# Patient Record
Sex: FEMALE | Race: BLACK OR AFRICAN AMERICAN | NOT HISPANIC OR LATINO | URBAN - METROPOLITAN AREA
[De-identification: names, ages, dates, MRNs, and addresses within clinical notes are randomized per-mention and may not be internally consistent; named-entity substitution may affect disease eponyms.]

---

## 2021-03-12 ENCOUNTER — EMERGENCY (EMERGENCY)
Facility: HOSPITAL | Age: 51
LOS: 1 days | Discharge: HOME | End: 2021-03-12
Attending: HOSPITALIST | Admitting: HOSPITALIST
Payer: COMMERCIAL

## 2021-03-12 VITALS
OXYGEN SATURATION: 100 % | RESPIRATION RATE: 16 BRPM | HEART RATE: 68 BPM | SYSTOLIC BLOOD PRESSURE: 144 MMHG | TEMPERATURE: 98 F | DIASTOLIC BLOOD PRESSURE: 88 MMHG

## 2021-03-12 VITALS
DIASTOLIC BLOOD PRESSURE: 82 MMHG | OXYGEN SATURATION: 100 % | TEMPERATURE: 98 F | SYSTOLIC BLOOD PRESSURE: 151 MMHG | RESPIRATION RATE: 15 BRPM | HEART RATE: 63 BPM

## 2021-03-12 PROCEDURE — 99284 EMERGENCY DEPT VISIT MOD MDM: CPT

## 2021-03-12 NOTE — ED PROVIDER NOTE - EYES
Normal rate, regular rhythm.  Heart sounds S1, S2.  No murmurs, rubs or gallops. Right Abnormal/Left Normal

## 2021-03-12 NOTE — ED PROVIDER NOTE - OBJECTIVE STATEMENT
51 Y/O F PMH HTN, Anxiety, Depression PSH  X 2 C/O R eye pain for the past 2 days. Pt states the pain is 7/10 with associated blurry vision in the R eye. Pt states she wears glasses but states the vision is normal when corrected. Pt states she has not seen an eye doctor recently. Pt denies any other sx or acute complaints.

## 2021-03-12 NOTE — ED ADULT TRIAGE NOTE - CHIEF COMPLAINT QUOTE
Pt with co redness to right eye with burning and itchiness since  yesterday. pt also co blurry vision to right eye . Pt wears glasses.  Pt also  co  headache to forehead  since last night.

## 2021-03-12 NOTE — ED PROVIDER NOTE - PROGRESS NOTE DETAILS
AARON Quiroga: Unable to measure pressure in spite of trying X2, consulted Optho resident who will eval. AARON Quiroga: Optho gave D/C recommendations, will D/C.

## 2021-03-12 NOTE — ED PROVIDER NOTE - ATTENDING CONTRIBUTION TO CARE
50F with hx of htn, anxiety p/w redness to her right eye for the past 2 days. + pain and blurry vision despite using her glasses. no similar sx in the past, trauma to the eye, fevers.    ***GEN - NAD; well appearing; A+O x3 ***HEAD - NC/AT ***EYES/NOSE - PERRL, + right eyue scleral injection EOMI, mucous membranes moist, no discharge ***THROAT: Oral cavity and pharynx normal. No inflammation, swelling, exudate, or lesions.  ***NECK: Neck supple, non-tender without lymphadenopathy, no masses, no thyromegaly.   ***PULMONARY - CTA b/l, symmetric breath sounds. ***CARDIAC -s1s2, RRR, no M,G,R  ***ABDOMEN - +BS, ND, NT, soft, no guarding, no rebound, no masses   ***BACK - no CVA tenderness, Normal  spine ***EXTREMITIES - symmetric pulses, 2+ dp, capillary refill < 2 seconds, no clubbing, no cyanosis, no edema ***SKIN - no rash or bruising   ***NEUROLOGIC - alert, CN 2-12 intact    MDM: 50F with right eye redness, pain and blurry vision. measure pressures, visual acuity, ophthal eval.

## 2021-03-12 NOTE — ED PROVIDER NOTE - NSFOLLOWUPINSTRUCTIONS_ED_ALL_ED_FT
Use the artifical tears eye drops every hour while awake. Use over the counter Systane Nightime Lubricant ointment before bedtime. Report to the Eye clinic on Monday for follow up.  Advance activity as tolerated.  Continue all previously prescribed medications as directed.  Follow up with your primary care physician in 48-72 hours- bring copies of your results.  Return to the ER for worsening or persistent symptoms, and/or ANY NEW OR CONCERNING SYMPTOMS. THIS INCLUDES BUT IS NOT LIMITED TO LOSS OR CHANGE OF VISION OR FOR ANY OTHER SYMPTOMS THAT CONCERN YOU. If you have issues obtaining follow up, please call: [phone] to obtain a doctor or specialist who takes your insurance in your area.  You may call [phone] to make an appointment with the internal medicine clinic. Go to the [facility] eye clinic on Monday. The address is [address] [place] [address] and the phone is . Use the artifical tears eye drops every hour while awake. Use over the counter Systane Nightime Lubricant ointment before bedtime. Report to the Eye clinic on Monday for follow up.  Advance activity as tolerated.  Continue all previously prescribed medications as directed.  Follow up with your primary care physician in 48-72 hours- bring copies of your results.  Return to the ER for worsening or persistent symptoms, and/or ANY NEW OR CONCERNING SYMPTOMS. THIS INCLUDES BUT IS NOT LIMITED TO LOSS OR CHANGE OF VISION OR FOR ANY OTHER SYMPTOMS THAT CONCERN YOU. If you have issues obtaining follow up, please call: [phone] to obtain a doctor or specialist who takes your insurance in your area.  You may call [phone] to make an appointment with the internal medicine clinic.

## 2021-03-12 NOTE — ED PROVIDER NOTE - PATIENT PORTAL LINK FT
You can access the FollowMyHealth Patient Portal offered by [facility] by registering at the following website: [email or web address]. By joining [employer]’s FollowMyHealth portal, you will also be able to view your health information using other applications (apps) compatible with our system.

## 2021-03-12 NOTE — ED PROVIDER NOTE - CLINICAL SUMMARY MEDICAL DECISION MAKING FREE TEXT BOX
49 Y/O F PMH HTN, Anxiety, Depression PSH  X 2 C/O R eye pain for the past 2 days. Pt states the pain is 7/10 with associated blurry vision in the R eye. Pt states she wears glasses but states the vision is normal when corrected. Unable to obtain pressures via tonopen, consulted Optho who will eval.

## 2021-03-12 NOTE — CONSULT NOTE ADULT - SUBJECTIVE AND OBJECTIVE BOX
[facility] DEPARTMENT OF OPHTHALMOLOGY - INITIAL ADULT CONSULT  -----------------------------------------------------------------------------  Catrachita Lovelace MD, MPH, PGY-2  Pager: [phone]  -----------------------------------------------------------------------------    HPI: 49 yo female hx HTN, anxiety, depression presents to [facility] ED for a history of red eye OD x1 day. Pt works as a  and while she was sitting at her desk at work yesterday, the pain started. Patient endorses blurry vision, + monocular vertical diplopia OD with retro-orbital eye pain and pain with EOM on adduction and infraduction. Denies flashes/floaters. + photosensitivity. Denies pulsatile tinnitus. Headache 5/10 around right frontal area that does not radiate. Pt has had migraines in the past but this does not feel like a migraine headache. Denies GCA symptoms (jaw pain, unexplained weight loss, fevers, night sweats, new joint pain) but does endorse scalp tenderness right>left.     Pt denies history of autoimmune disease. Denies new rashes,  symptoms. Pt also denies trauma to the eye or FB to the eye. Tried saline eye drops without relief.     PMH: per HPI   POcHx: denies surg/laser  FH: denies glc/amd  Social History: denies etoh/tobacco  Ophthalmic Medications: none  Allergies: NKDA    Review of Systems: per HPI     VITALS: T(C): --  T(F): --  HR: --  BP: --  RR: --  SpO2: --  Wt(kg): --  General: AAO x 3, appropriate mood and affect    Ophthalmology Exam:  Visual acuity (cc): 20/25-2 OD, 20/30-2 OS   Pupils: PERRL OU, no APD (right pupil sluggishly reactive, left pupil brisk)  Ttono: 16/11  Extraocular movements (EOMs): Full OU, pain on adduction and infraduction OD. + monocular vertical diplopia OD.   Confrontational Visual Field (CVF): Full OU  Color Plates: 12/12 OU    Pen Light Exam (PLE)  External: Flat OU  Lids/Lashes/Lacrimal Ducts: Flat OU    Sclera/Conjunctiva: W+Q OU  Cornea: Cl OU  Anterior Chamber: D+F OU    Iris: Flat OU  Lens: Cl OU    Fundus Exam: dilated with 1% tropicamide and 2.5% phenylephrine  Approval obtained from primary team for dilation  Patient aware that pupils can remained dilated for at least 4-6 hours  Exam performed with 20D lens    Vitreous: wnl OU  Disc, cup/disc: sharp and pink, 0.4 OU  Macula: wnl OU  Vessels: wnl OU  Periphery: wnl OU    Labs/Imaging:     A/P: 49 yo female hx HTN, anxiety, depression presents to [facility] ED for a history of red eye OD x1 day.    [facility] DEPARTMENT OF OPHTHALMOLOGY - INITIAL ADULT CONSULT  -----------------------------------------------------------------------------  Catrachita Lovelace MD, MPH, PGY-2  Pager: [phone]  -----------------------------------------------------------------------------    HPI: 49 yo female hx HTN, anxiety, depression presents to [facility] ED for a history of red eye OD x1 day. Pt works as a  and while she was sitting at her desk at work yesterday, the pain started. Patient endorses blurry vision, + monocular vertical diplopia OD with retro-orbital eye pain and pain with EOM on adduction and infraduction. Denies flashes/floaters. + photosensitivity. Denies pulsatile tinnitus. Headache 5/10 around right frontal area that does not radiate. Pt has had migraines in the past but this does not feel like a migraine headache. Denies GCA symptoms (jaw pain, unexplained weight loss, fevers, night sweats, new joint pain) but does endorse scalp tenderness right>left.     Pt denies history of autoimmune disease. Denies new rashes,  symptoms. Pt also denies trauma to the eye or FB to the eye. Tried saline eye drops without relief.     PMH: per HPI   POcHx: denies surg/laser  FH: denies glc/amd  Social History: denies etoh/tobacco  Ophthalmic Medications: none  Allergies: NKDA    Review of Systems: per HPI     VITALS: T(C): --  T(F): --  HR: --  BP: --  RR: --  SpO2: --  Wt(kg): --  General: AAO x 3, appropriate mood and affect    Ophthalmology Exam:  Visual acuity (cc): 20/25-2 OD, 20/30-2 OS   Pupils: PERRL OU, no APD (right pupil sluggishly reactive, left pupil brisk)  Ttono: 16/11  Extraocular movements (EOMs): Full OU, pain on adduction and infraduction OD. + monocular vertical diplopia OD.   Confrontational Visual Field (CVF): Full OU  Color Plates: 12/12 OU    Pen Light Exam (PLE)  External: Flat OU  Lids/Lashes/Lacrimal Ducts: Flat OU    Sclera/Conjunctiva: 1+ conj injection OD, white and quiet OS   Cornea: 2-3+ SPK with mucoid stranding OD (no infiltrate or epi defect), 1+ SPK OS   Anterior Chamber: 1-2 cell with possible flare OD (difficult exam due to severity of surface disease), dark and quiet OS   Iris: Flat OU (no synechiae OD)  Lens: NS and CC OU     Fundus Exam: dilated with 1% tropicamide and 2.5% phenylephrine  Approval obtained from primary team for dilation  Patient aware that pupils can remained dilated for at least 4-6 hours  Exam performed with 20D lens    Vitreous: wnl OU  Disc, cup/disc: sharp and pink, 0.3 OU  Macula: wnl OU  Vessels: wnl OU  Periphery: wnl OU    No RTR, no proptosis   +Scalp tenderness bilateral, temporal artery pulses palpable bilaterally     Labs/Imaging:     A/P: 49 yo female hx HTN, anxiety, depression presents to [facility] ED for a history of red eye OD x1 day. Exam findings c/w severe dry eye OU. 1-2 cell on AC exam OD not impressive for uveitis, difficult to discern flare based on severity of surface disease. Pain with EOM likely in s/o surface inflammation. No suspicion for GCA at this time given patient age, scalp tenderness likely unrelated and no other reported symptoms. Sluggish pupil reactivity OD could be from underlying sphincter issue. No APD appreciated on exam. Please see recommendations below:     #Severe dry eye OD>OS   - VA excellent   - IOP wnl   - Artificial tears preservative free q1hr OU while awake   - Systane ointment nightly OU   - Will call patient over the weekend for symptom check, if not improving/worsening, will see over the weekend as emergent in-office visit, otherwise will see her in clinic on Monday (Address below)     [address] [place] [address]   [phone]     Case d/w Dr. Prince, Chief Resident.        [facility] DEPARTMENT OF OPHTHALMOLOGY - INITIAL ADULT CONSULT  -----------------------------------------------------------------------------  Catrachita Lovelace MD, MPH, PGY-2  Pager: [phone]  -----------------------------------------------------------------------------    HPI: 49 yo female hx HTN, anxiety, depression presents to [facility] ED for a history of red eye OD x1 day. Pt works as a  and while she was sitting at her desk at work yesterday, the pain started. Patient endorses blurry vision, + monocular vertical diplopia OD with retro-orbital eye pain and pain with EOM on adduction and infraduction. Denies flashes/floaters. + photosensitivity. Denies pulsatile tinnitus. Headache 5/10 around right frontal area that does not radiate. Pt has had migraines in the past but this does not feel like a migraine headache. Denies GCA symptoms (jaw pain, unexplained weight loss, fevers, night sweats, new joint pain) but does endorse scalp tenderness right>left.     Pt denies history of autoimmune disease. Denies new rashes,  symptoms. Pt also denies trauma to the eye or FB to the eye. Tried saline eye drops without relief.     PMH: per HPI   POcHx: denies surg/laser  FH: denies glc/amd  Social History: denies etoh/tobacco  Ophthalmic Medications: none  Allergies: penicillin and morphine (?, pt unsure)    Review of Systems: per HPI     VITALS: T(C): --  T(F): --  HR: --  BP: --  RR: --  SpO2: --  Wt(kg): --  General: AAO x 3, appropriate mood and affect    Ophthalmology Exam:  Visual acuity (cc): 20/25-2 OD, 20/30-2 OS   Pupils: PERRL OU, no APD (right pupil sluggishly reactive, left pupil brisk)  Ttono: 16/11  Extraocular movements (EOMs): Full OU, pain on adduction and infraduction OD. + monocular vertical diplopia OD.   Confrontational Visual Field (CVF): Full OU  Color Plates: 12/12 OU    Pen Light Exam (PLE)  External: Flat OU  Lids/Lashes/Lacrimal Ducts: Flat OU    Sclera/Conjunctiva: 1+ conj injection OD, white and quiet OS   Cornea: 2-3+ SPK with mucoid stranding OD (no infiltrate or epi defect), 1+ SPK OS   Anterior Chamber: 1-2 cell with possible flare OD (difficult exam due to severity of surface disease), dark and quiet OS   Iris: Flat OU (no synechiae OD)  Lens: NS and CC OU     Fundus Exam: dilated with 1% tropicamide and 2.5% phenylephrine  Approval obtained from primary team for dilation  Patient aware that pupils can remained dilated for at least 4-6 hours  Exam performed with 20D lens    Vitreous: wnl OU  Disc, cup/disc: sharp and pink, 0.3 OU  Macula: wnl OU  Vessels: wnl OU  Periphery: wnl OU    No RTR, no proptosis   +Scalp tenderness bilateral, temporal artery pulses palpable bilaterally     Labs/Imaging:     A/P: 49 yo female hx HTN, anxiety, depression presents to [facility] ED for a history of red eye OD x1 day. Exam findings c/w severe dry eye OU. 1-2 cell on AC exam OD not impressive for uveitis, difficult to discern flare based on severity of surface disease. Pain with EOM likely in s/o surface inflammation. No suspicion for GCA at this time given patient age, scalp tenderness likely unrelated and no other reported symptoms. Sluggish pupil reactivity OD could be from underlying sphincter issue. No APD appreciated on exam. Please see recommendations below:     #Severe dry eye OD>OS   - VA excellent   - IOP wnl   - Artificial tears preservative free q1hr OU while awake   - Systane ointment nightly OU   - Will call patient over the weekend for symptom check, if not improving/worsening, will see over the weekend as emergent in-office visit, otherwise will see her in clinic on Monday (Address below)     [address] [place] [id number]   [phone]     Case d/w Dr. Prince, Chief Resident.

## 2021-10-02 ENCOUNTER — EMERGENCY (EMERGENCY)
Facility: HOSPITAL | Age: 51
LOS: 1 days | Discharge: HOME | End: 2021-10-02
Attending: EMERGENCY MEDICINE | Admitting: EMERGENCY MEDICINE
Payer: COMMERCIAL

## 2021-10-02 VITALS
HEART RATE: 64 BPM | OXYGEN SATURATION: 100 % | RESPIRATION RATE: 15 BRPM | TEMPERATURE: 98 F | DIASTOLIC BLOOD PRESSURE: 91 MMHG | SYSTOLIC BLOOD PRESSURE: 144 MMHG

## 2021-10-02 VITALS
OXYGEN SATURATION: 100 % | RESPIRATION RATE: 12 BRPM | TEMPERATURE: 98 F | DIASTOLIC BLOOD PRESSURE: 115 MMHG | HEART RATE: 68 BPM | SYSTOLIC BLOOD PRESSURE: 223 MMHG

## 2021-10-02 PROBLEM — I10 ESSENTIAL (PRIMARY) HYPERTENSION: Chronic | Status: ACTIVE | Noted: 2021-03-12

## 2021-10-02 PROBLEM — F41.9 ANXIETY DISORDER, UNSPECIFIED: Chronic | Status: ACTIVE | Noted: 2021-03-12

## 2021-10-02 PROBLEM — F32.9 MAJOR DEPRESSIVE DISORDER, SINGLE EPISODE, UNSPECIFIED: Chronic | Status: ACTIVE | Noted: 2021-03-12

## 2021-10-02 LAB
ALBUMIN SERPL ELPH-MCNC: 4.1 G/DL — SIGNIFICANT CHANGE UP (ref 3.3–5)
ALP SERPL-CCNC: 77 U/L — SIGNIFICANT CHANGE UP (ref 40–120)
ALT FLD-CCNC: 14 U/L — SIGNIFICANT CHANGE UP (ref 4–33)
ANION GAP SERPL CALC-SCNC: 11 MMOL/L — SIGNIFICANT CHANGE UP (ref 7–14)
AST SERPL-CCNC: 17 U/L — SIGNIFICANT CHANGE UP (ref 4–32)
BASOPHILS # BLD AUTO: 0.02 K/UL — SIGNIFICANT CHANGE UP (ref 0–0.2)
BASOPHILS NFR BLD AUTO: 0.4 % — SIGNIFICANT CHANGE UP (ref 0–2)
BILIRUB SERPL-MCNC: 0.3 MG/DL — SIGNIFICANT CHANGE UP (ref 0.2–1.2)
BUN SERPL-MCNC: 11 MG/DL — SIGNIFICANT CHANGE UP (ref 7–23)
CALCIUM SERPL-MCNC: 9.6 MG/DL — SIGNIFICANT CHANGE UP (ref 8.4–10.5)
CHLORIDE SERPL-SCNC: 103 MMOL/L — SIGNIFICANT CHANGE UP (ref 98–107)
CO2 SERPL-SCNC: 24 MMOL/L — SIGNIFICANT CHANGE UP (ref 22–31)
CREAT SERPL-MCNC: 0.78 MG/DL — SIGNIFICANT CHANGE UP (ref 0.5–1.3)
EOSINOPHIL # BLD AUTO: 0.03 K/UL — SIGNIFICANT CHANGE UP (ref 0–0.5)
EOSINOPHIL NFR BLD AUTO: 0.5 % — SIGNIFICANT CHANGE UP (ref 0–6)
GLUCOSE SERPL-MCNC: 84 MG/DL — SIGNIFICANT CHANGE UP (ref 70–99)
HCT VFR BLD CALC: 43.6 % — SIGNIFICANT CHANGE UP (ref 34.5–45)
HGB BLD-MCNC: 14.4 G/DL — SIGNIFICANT CHANGE UP (ref 11.5–15.5)
IANC: 2.63 K/UL — SIGNIFICANT CHANGE UP (ref 1.5–8.5)
IMM GRANULOCYTES NFR BLD AUTO: 0.2 % — SIGNIFICANT CHANGE UP (ref 0–1.5)
LYMPHOCYTES # BLD AUTO: 2.41 K/UL — SIGNIFICANT CHANGE UP (ref 1–3.3)
LYMPHOCYTES # BLD AUTO: 43 % — SIGNIFICANT CHANGE UP (ref 13–44)
MCHC RBC-ENTMCNC: 28.4 PG — SIGNIFICANT CHANGE UP (ref 27–34)
MCHC RBC-ENTMCNC: 33 GM/DL — SIGNIFICANT CHANGE UP (ref 32–36)
MCV RBC AUTO: 86 FL — SIGNIFICANT CHANGE UP (ref 80–100)
MONOCYTES # BLD AUTO: 0.51 K/UL — SIGNIFICANT CHANGE UP (ref 0–0.9)
MONOCYTES NFR BLD AUTO: 9.1 % — SIGNIFICANT CHANGE UP (ref 2–14)
NEUTROPHILS # BLD AUTO: 2.63 K/UL — SIGNIFICANT CHANGE UP (ref 1.8–7.4)
NEUTROPHILS NFR BLD AUTO: 46.8 % — SIGNIFICANT CHANGE UP (ref 43–77)
NRBC # BLD: 0 /100 WBCS — SIGNIFICANT CHANGE UP
NRBC # FLD: 0 K/UL — SIGNIFICANT CHANGE UP
PLATELET # BLD AUTO: 285 K/UL — SIGNIFICANT CHANGE UP (ref 150–400)
POTASSIUM SERPL-MCNC: 4.3 MMOL/L — SIGNIFICANT CHANGE UP (ref 3.5–5.3)
POTASSIUM SERPL-SCNC: 4.3 MMOL/L — SIGNIFICANT CHANGE UP (ref 3.5–5.3)
PROT SERPL-MCNC: 7.5 G/DL — SIGNIFICANT CHANGE UP (ref 6–8.3)
RBC # BLD: 5.07 M/UL — SIGNIFICANT CHANGE UP (ref 3.8–5.2)
RBC # FLD: 13.1 % — SIGNIFICANT CHANGE UP (ref 10.3–14.5)
SODIUM SERPL-SCNC: 138 MMOL/L — SIGNIFICANT CHANGE UP (ref 135–145)
TROPONIN T, HIGH SENSITIVITY RESULT: <6 NG/L — SIGNIFICANT CHANGE UP
WBC # BLD: 5.61 K/UL — SIGNIFICANT CHANGE UP (ref 3.8–10.5)
WBC # FLD AUTO: 5.61 K/UL — SIGNIFICANT CHANGE UP (ref 3.8–10.5)

## 2021-10-02 PROCEDURE — 99285 EMERGENCY DEPT VISIT HI MDM: CPT

## 2021-10-02 PROCEDURE — 71046 X-RAY EXAM CHEST 2 VIEWS: CPT | Mod: 26

## 2021-10-02 RX ORDER — METOCLOPRAMIDE HCL 10 MG
10 TABLET ORAL ONCE
Refills: 0 | Status: COMPLETED | OUTPATIENT
Start: 2021-10-02 | End: 2021-10-02

## 2021-10-02 RX ORDER — AMLODIPINE BESYLATE 2.5 MG/1
1 TABLET ORAL
Qty: 30 | Refills: 0
Start: 2021-10-02 | End: 2021-10-31

## 2021-10-02 RX ORDER — ACETAMINOPHEN 500 MG
975 TABLET ORAL ONCE
Refills: 0 | Status: COMPLETED | OUTPATIENT
Start: 2021-10-02 | End: 2021-10-02

## 2021-10-02 RX ORDER — AMLODIPINE BESYLATE 2.5 MG/1
10 TABLET ORAL ONCE
Refills: 0 | Status: COMPLETED | OUTPATIENT
Start: 2021-10-02 | End: 2021-10-02

## 2021-10-02 RX ORDER — LOSARTAN/HYDROCHLOROTHIAZIDE 100MG-25MG
1 TABLET ORAL
Qty: 30 | Refills: 0
Start: 2021-10-02 | End: 2021-10-31

## 2021-10-02 RX ORDER — LOSARTAN POTASSIUM 100 MG/1
100 TABLET, FILM COATED ORAL ONCE
Refills: 0 | Status: COMPLETED | OUTPATIENT
Start: 2021-10-02 | End: 2021-10-02

## 2021-10-02 RX ORDER — SODIUM CHLORIDE 9 MG/ML
1000 INJECTION INTRAMUSCULAR; INTRAVENOUS; SUBCUTANEOUS ONCE
Refills: 0 | Status: COMPLETED | OUTPATIENT
Start: 2021-10-02 | End: 2021-10-02

## 2021-10-02 RX ORDER — HYDROCHLOROTHIAZIDE 25 MG
25 TABLET ORAL ONCE
Refills: 0 | Status: COMPLETED | OUTPATIENT
Start: 2021-10-02 | End: 2021-10-02

## 2021-10-02 RX ADMIN — Medication 975 MILLIGRAM(S): at 14:46

## 2021-10-02 RX ADMIN — Medication 10 MILLIGRAM(S): at 14:52

## 2021-10-02 RX ADMIN — AMLODIPINE BESYLATE 10 MILLIGRAM(S): 2.5 TABLET ORAL at 15:31

## 2021-10-02 RX ADMIN — SODIUM CHLORIDE 2000 MILLILITER(S): 9 INJECTION INTRAMUSCULAR; INTRAVENOUS; SUBCUTANEOUS at 14:47

## 2021-10-02 RX ADMIN — LOSARTAN POTASSIUM 100 MILLIGRAM(S): 100 TABLET, FILM COATED ORAL at 15:31

## 2021-10-02 RX ADMIN — Medication 25 MILLIGRAM(S): at 15:30

## 2021-10-02 NOTE — ED ADULT NURSE REASSESSMENT NOTE - NS ED NURSE REASSESS COMMENT FT1
Pt resting comfortably in bed, states headache is tolerable at this time, BP continues to be high meds given as ordered, will reassess.

## 2021-10-02 NOTE — ED PROVIDER NOTE - OBJECTIVE STATEMENT
Attendinyo female presents with elevated blood pressure for several weeks.  pt moved here about 1 year ago from [place] and has not established medical care yet.  Pt has been off her BP meds for about 1 year now.  has been having gradual onset headaches for the past several weeks.  no fever or chills.  no nausea or vomiting.  had some mild chest pressure at rest that is non exertional earlier today.  no shortness of breath.  no diaphoresis, no nausea or vomiting.  pt went to urgent care today and was sent here after being given aspirin.

## 2021-10-02 NOTE — ED PROVIDER NOTE - CLINICAL SUMMARY MEDICAL DECISION MAKING FREE TEXT BOX
Hypertension, likely due to medication noncompliance.  headache sounds benign based on history and physical.  will treat symptoms.   Chest pain:  will get EKG, troponin, and reassess.

## 2021-10-02 NOTE — ED PROVIDER NOTE - PROGRESS NOTE DETAILS
Acerra:  pt feels much better.  headache resolved.  no nausea or vomiting.  will discharge with outpatient PCP followup.

## 2021-10-02 NOTE — ED PROVIDER NOTE - NSFOLLOWUPINSTRUCTIONS_ED_ALL_ED_FT
Follow up with your Doctor in 2-3 days.    Take your blood pressure medication as directed.    Keep a blood pressure diary and share this with your doctor.  Return to the ED for worse headache, vomiting, nausea, chest pain or other emergent concerns.

## 2021-10-02 NOTE — ED PROVIDER NOTE - PATIENT PORTAL LINK FT
You can access the FollowMyHealth Patient Portal offered by [facility] by registering at the following website: [email or web address]. By joining [employer]’s FollowMyHealth portal, you will also be able to view your health information using other applications (apps) compatible with our system.

## 2021-10-02 NOTE — ED ADULT NURSE NOTE - NSIMPLEMENTINTERV_GEN_ALL_ED
Implemented All Universal Safety Interventions:  [place] to call system. Call bell, personal items and telephone within reach. Instruct patient to call for assistance. Room bathroom lighting operational. Non-slip footwear when patient is off stretcher. Physically safe environment: no spills, clutter or unnecessary equipment. Stretcher in lowest position, wheels locked, appropriate side rails in place.

## 2021-10-02 NOTE — ED ADULT TRIAGE NOTE - CHIEF COMPLAINT QUOTE
pt ambulatory to walk in triage c/o worsening headache x 3 days stated 10/10 pain. pt has been non complaint with BP meds for several months, ran out of meds. pt took Tylenol for head ache today with no change in pain. PMH: HTN, anxiety

## 2021-10-02 NOTE — ED ADULT NURSE NOTE - OBJECTIVE STATEMENT
Pt presents to rm 4, A&Ox4, pmhx of HTN, here for evaluation of headache and high BP, pt states she is noncompliant with home BP meds. Denies chest pain, shortness of breath, palpitations, diaphoresis, fevers, dizziness, nausea, vomiting, diarrhea, or urinary symptoms at this time. IV established in right arm with a 20G placed by JOSEMANUEL Jara, labs drawn and sent, call bell in reach, warm blanket provided, bed in lowest position, side rails up karolina, MD evaluation in progress, pt on telemetry. Will continue to monitor.

## 2021-10-06 ENCOUNTER — EMERGENCY (EMERGENCY)
Facility: HOSPITAL | Age: 51
LOS: 1 days | Discharge: HOME | End: 2021-10-06
Attending: EMERGENCY MEDICINE | Admitting: EMERGENCY MEDICINE
Payer: COMMERCIAL

## 2021-10-06 VITALS
HEART RATE: 77 BPM | RESPIRATION RATE: 18 BRPM | OXYGEN SATURATION: 100 % | TEMPERATURE: 98 F | DIASTOLIC BLOOD PRESSURE: 74 MMHG | SYSTOLIC BLOOD PRESSURE: 136 MMHG

## 2021-10-06 LAB
ALBUMIN SERPL ELPH-MCNC: 4.2 G/DL — SIGNIFICANT CHANGE UP (ref 3.3–5)
ALP SERPL-CCNC: 88 U/L — SIGNIFICANT CHANGE UP (ref 40–120)
ALT FLD-CCNC: 18 U/L — SIGNIFICANT CHANGE UP (ref 4–33)
ANION GAP SERPL CALC-SCNC: 15 MMOL/L — HIGH (ref 7–14)
AST SERPL-CCNC: 28 U/L — SIGNIFICANT CHANGE UP (ref 4–32)
BASOPHILS # BLD AUTO: 0.05 K/UL — SIGNIFICANT CHANGE UP (ref 0–0.2)
BASOPHILS NFR BLD AUTO: 0.9 % — SIGNIFICANT CHANGE UP (ref 0–2)
BILIRUB SERPL-MCNC: 0.3 MG/DL — SIGNIFICANT CHANGE UP (ref 0.2–1.2)
BUN SERPL-MCNC: 16 MG/DL — SIGNIFICANT CHANGE UP (ref 7–23)
CALCIUM SERPL-MCNC: 9.7 MG/DL — SIGNIFICANT CHANGE UP (ref 8.4–10.5)
CHLORIDE SERPL-SCNC: 99 MMOL/L — SIGNIFICANT CHANGE UP (ref 98–107)
CO2 SERPL-SCNC: 21 MMOL/L — LOW (ref 22–31)
CREAT SERPL-MCNC: 0.72 MG/DL — SIGNIFICANT CHANGE UP (ref 0.5–1.3)
EOSINOPHIL # BLD AUTO: 0.11 K/UL — SIGNIFICANT CHANGE UP (ref 0–0.5)
EOSINOPHIL NFR BLD AUTO: 1.9 % — SIGNIFICANT CHANGE UP (ref 0–6)
GLUCOSE SERPL-MCNC: 89 MG/DL — SIGNIFICANT CHANGE UP (ref 70–99)
HCT VFR BLD CALC: 45 % — SIGNIFICANT CHANGE UP (ref 34.5–45)
HGB BLD-MCNC: 14.9 G/DL — SIGNIFICANT CHANGE UP (ref 11.5–15.5)
IANC: 2.67 K/UL — SIGNIFICANT CHANGE UP (ref 1.5–8.5)
IMM GRANULOCYTES NFR BLD AUTO: 0.2 % — SIGNIFICANT CHANGE UP (ref 0–1.5)
LYMPHOCYTES # BLD AUTO: 2.56 K/UL — SIGNIFICANT CHANGE UP (ref 1–3.3)
LYMPHOCYTES # BLD AUTO: 44.4 % — HIGH (ref 13–44)
MCHC RBC-ENTMCNC: 27.9 PG — SIGNIFICANT CHANGE UP (ref 27–34)
MCHC RBC-ENTMCNC: 33.1 GM/DL — SIGNIFICANT CHANGE UP (ref 32–36)
MCV RBC AUTO: 84.3 FL — SIGNIFICANT CHANGE UP (ref 80–100)
MONOCYTES # BLD AUTO: 0.37 K/UL — SIGNIFICANT CHANGE UP (ref 0–0.9)
MONOCYTES NFR BLD AUTO: 6.4 % — SIGNIFICANT CHANGE UP (ref 2–14)
NEUTROPHILS # BLD AUTO: 2.67 K/UL — SIGNIFICANT CHANGE UP (ref 1.8–7.4)
NEUTROPHILS NFR BLD AUTO: 46.2 % — SIGNIFICANT CHANGE UP (ref 43–77)
NRBC # BLD: 0 /100 WBCS — SIGNIFICANT CHANGE UP
NRBC # FLD: 0 K/UL — SIGNIFICANT CHANGE UP
PLATELET # BLD AUTO: 275 K/UL — SIGNIFICANT CHANGE UP (ref 150–400)
POTASSIUM SERPL-MCNC: 4.9 MMOL/L — SIGNIFICANT CHANGE UP (ref 3.5–5.3)
POTASSIUM SERPL-SCNC: 4.9 MMOL/L — SIGNIFICANT CHANGE UP (ref 3.5–5.3)
PROT SERPL-MCNC: 8.4 G/DL — HIGH (ref 6–8.3)
RBC # BLD: 5.34 M/UL — HIGH (ref 3.8–5.2)
RBC # FLD: 13.1 % — SIGNIFICANT CHANGE UP (ref 10.3–14.5)
SODIUM SERPL-SCNC: 135 MMOL/L — SIGNIFICANT CHANGE UP (ref 135–145)
TROPONIN T, HIGH SENSITIVITY RESULT: <6 NG/L — SIGNIFICANT CHANGE UP
WBC # BLD: 5.77 K/UL — SIGNIFICANT CHANGE UP (ref 3.8–10.5)
WBC # FLD AUTO: 5.77 K/UL — SIGNIFICANT CHANGE UP (ref 3.8–10.5)

## 2021-10-06 PROCEDURE — 71046 X-RAY EXAM CHEST 2 VIEWS: CPT | Mod: 26

## 2021-10-06 PROCEDURE — 93010 ELECTROCARDIOGRAM REPORT: CPT

## 2021-10-06 PROCEDURE — 99285 EMERGENCY DEPT VISIT HI MDM: CPT | Mod: 25

## 2021-10-06 NOTE — ED PROVIDER NOTE - NSFOLLOWUPINSTRUCTIONS_ED_ALL_ED_FT
You were seen in the emergency department for chest tightness. Your ECG, chest xray and bloodwork in the ED are negative.     For pain, you may take Tylenol (acetaminophen) and/or ibuprofen (advil or motrin). Please follow the instructions on the label/container.     Please follow up with your primary care doctor in 1-3 days for continuation of your care.     Return to the emergency department if you experience any new/concerning/worsening symptoms such as but not limited to: fever (>100.3F), severe nausea, vomiting, difficulty breathing, worsening chest pain or any other concerning symptoms.

## 2021-10-06 NOTE — ED PROVIDER NOTE - NS ED ROS FT
General: Denies fever, chills, unexpected weight loss/gain  HEENT: Denies headache, vision changes, tinnitus, sore throat  Cardiovascular: Reports chest tightness  Skin: Denies rash, erythema, color changes  Respiratory: Denies shortness of breath, difficulty breathing, cough  Endocrine: Denies sensitivity to heat, cold, increased urination  Gastrointestinal: Denies abdominal pain, nausea, vomiting, diarrhea, constipation, changes in bowel habits  Musculoskeletal: Denies back pain, lower extremity swelling, extremity pain  Genitourinary: Denies hematuria, dysuria, increased frequency  Neurologic: Denies loss of consciousness, weakness, numbness, tingling  Psychiatric: Denies history of psych, hallucinations

## 2021-10-06 NOTE — ED PROVIDER NOTE - ATTENDING CONTRIBUTION TO CARE
I performed a face-to-face evaluation of the patient and performed a history and physical examination. I agree with the history and physical examination.    50 F, p/w CP (tightness) and palpitations (resolved) after getting COVID vaccine this afternoon. BP increased 2/2 pain. No significant dizziness/sweating/nausea/SOB. Not likely PNA: no infectious Sx (eg, purulent cough). Not likely PE or other VTE: PERC or Wells low score, EKG no e/o R-heart strain. HEART score low. AFVSS. PE unremarkable. Has had multiple allergic reactions in the past. Will check EKG and trop and CXR. Re-assess pain and BP.

## 2021-10-06 NOTE — ED ADULT TRIAGE NOTE - CHIEF COMPLAINT QUOTE
c/o palpitations, blurry vision s/p 2nd vaccine dose 20 min PTA, PERRLA extremities are strong and equal, Hx of HTN on amlodipine , Anxiety not on any meds.

## 2021-10-06 NOTE — ED PROVIDER NOTE - PHYSICAL EXAMINATION
General: non-toxic appearing, in no respiratory distress  HEENT: atraumatic, normocephalic; pupils are equal, round and react to light, extraocular movements intact bilaterally without deficits, no conjunctival pallor, mucous membranes moist  Neck: no jugular venous distension, full range of motion  Chest/Lung: clear to auscultation bilaterally, no wheezes/rhonchi/rales  Heart: regular rate and rhythm, no murmur/gallops/rubs  Abdomen: normal bowel sounds, soft, non-tender, non-distended  Extremities: no lower extremity edema, +2 radial pulses bilaterally, +2 dorsalis pedis pulses bilaterally  Musculoskeletal: full range of motion of all 4 extremities, 5/5 strength and normal sensation in all 4 extremities   Nervous System: alert and oriented, no motor deficits or sensory deficits; CNII-XII grossly intact; no focal neurologic deficits  Skin: no rashes/lacerations noted

## 2021-10-06 NOTE — ED PROVIDER NOTE - PATIENT PORTAL LINK FT
You can access the FollowMyHealth Patient Portal offered by [facility] by registering at the following website: [email or web address]. By joining [employer]’s FollowMyHealth portal, you will also be able to view your health information using other applications (apps) compatible with our system.

## 2021-10-06 NOTE — ED PROVIDER NOTE - OBJECTIVE STATEMENT
Patient is a 50 year-old-female with history of HTN presents with chest tightness s/p COVID vaccine today. Received 2nd dose at around 1:45 pm today and started having dizziness, blurry vision and chest tightness immediately afterwards. Chest tightness located in left sternal area, no radiation, pleuritic, positional (worsen by sitting up), non-exertional, no nausea/vomiting, no diaphoresis. No longer dizzy or having blurry vision. Denies fever, chills, abdominal pain, dysuria, hematuria, diarrhea, constipation.

## 2021-10-06 NOTE — ED PROVIDER NOTE - CLINICAL SUMMARY MEDICAL DECISION MAKING FREE TEXT BOX
50 year-old-female with history of HTN presents with non-exertional, positional and pleuritic chest tightness s/p COVID vaccine. Patient is afebrile, hemodynamically stable and saturating well on room air. Unremarkable physical exam. Low suspicion for ACS/MI. However will obtain ACS workup - CBC, CMP, trop, ECG and chest xray. Anticipate discharge with PCP follow up if negative workup.

## 2021-10-06 NOTE — ED ADULT NURSE NOTE - OBJECTIVE STATEMENT
Pt is a 50 year old female reporting to the ED for lightheadedness after receiving 2nd dose fo pfizer vaccine. Pt is AOX4. Pt denies  SOB. PT respirations even an unlabored. Pt appears to be comfortable, in NAD. Pt denies fever, chills, n/v/d. Pt denies abdominal pain, dysuria, hematuria. 20 g iv placed, will continue to monitor.

## 2021-11-21 ENCOUNTER — EMERGENCY (EMERGENCY)
Facility: HOSPITAL | Age: 51
LOS: 1 days | Discharge: HOME | End: 2021-11-21
Admitting: STUDENT IN AN ORGANIZED HEALTH CARE EDUCATION/TRAINING PROGRAM
Payer: COMMERCIAL

## 2021-11-21 VITALS
TEMPERATURE: 98 F | HEART RATE: 84 BPM | DIASTOLIC BLOOD PRESSURE: 76 MMHG | RESPIRATION RATE: 16 BRPM | OXYGEN SATURATION: 100 % | SYSTOLIC BLOOD PRESSURE: 122 MMHG

## 2021-11-21 PROCEDURE — 99285 EMERGENCY DEPT VISIT HI MDM: CPT

## 2021-11-21 NOTE — ED ADULT TRIAGE NOTE - CHIEF COMPLAINT QUOTE
pt generalized abdominal pain x a few days worsening. pt reports passed 2 heavy vaginal clots tonight. denies n/v/d, fever. Pt has a UTI on medication x 2 days. denies any other complaints. hx. HTN, anxiety.

## 2021-11-22 VITALS
RESPIRATION RATE: 20 BRPM | DIASTOLIC BLOOD PRESSURE: 71 MMHG | OXYGEN SATURATION: 100 % | SYSTOLIC BLOOD PRESSURE: 116 MMHG | TEMPERATURE: 98 F | HEART RATE: 64 BPM

## 2021-11-22 LAB
ALBUMIN SERPL ELPH-MCNC: 3.9 G/DL — SIGNIFICANT CHANGE UP (ref 3.3–5)
ALP SERPL-CCNC: 80 U/L — SIGNIFICANT CHANGE UP (ref 40–120)
ALT FLD-CCNC: 14 U/L — SIGNIFICANT CHANGE UP (ref 4–33)
ANION GAP SERPL CALC-SCNC: 11 MMOL/L — SIGNIFICANT CHANGE UP (ref 7–14)
AST SERPL-CCNC: 26 U/L — SIGNIFICANT CHANGE UP (ref 4–32)
BASOPHILS # BLD AUTO: 0.02 K/UL — SIGNIFICANT CHANGE UP (ref 0–0.2)
BASOPHILS NFR BLD AUTO: 0.3 % — SIGNIFICANT CHANGE UP (ref 0–2)
BILIRUB SERPL-MCNC: 0.2 MG/DL — SIGNIFICANT CHANGE UP (ref 0.2–1.2)
BUN SERPL-MCNC: 13 MG/DL — SIGNIFICANT CHANGE UP (ref 7–23)
CALCIUM SERPL-MCNC: 9.3 MG/DL — SIGNIFICANT CHANGE UP (ref 8.4–10.5)
CHLORIDE SERPL-SCNC: 101 MMOL/L — SIGNIFICANT CHANGE UP (ref 98–107)
CO2 SERPL-SCNC: 25 MMOL/L — SIGNIFICANT CHANGE UP (ref 22–31)
CREAT SERPL-MCNC: 0.71 MG/DL — SIGNIFICANT CHANGE UP (ref 0.5–1.3)
EOSINOPHIL # BLD AUTO: 0.04 K/UL — SIGNIFICANT CHANGE UP (ref 0–0.5)
EOSINOPHIL NFR BLD AUTO: 0.6 % — SIGNIFICANT CHANGE UP (ref 0–6)
GLUCOSE SERPL-MCNC: 94 MG/DL — SIGNIFICANT CHANGE UP (ref 70–99)
HCG SERPL-ACNC: <5 MIU/ML — SIGNIFICANT CHANGE UP
HCT VFR BLD CALC: 39.5 % — SIGNIFICANT CHANGE UP (ref 34.5–45)
HGB BLD-MCNC: 12.4 G/DL — SIGNIFICANT CHANGE UP (ref 11.5–15.5)
IANC: 4.08 K/UL — SIGNIFICANT CHANGE UP (ref 1.5–8.5)
IMM GRANULOCYTES NFR BLD AUTO: 0.2 % — SIGNIFICANT CHANGE UP (ref 0–1.5)
LIDOCAIN IGE QN: 21 U/L — SIGNIFICANT CHANGE UP (ref 7–60)
LYMPHOCYTES # BLD AUTO: 1.73 K/UL — SIGNIFICANT CHANGE UP (ref 1–3.3)
LYMPHOCYTES # BLD AUTO: 27.3 % — SIGNIFICANT CHANGE UP (ref 13–44)
MCHC RBC-ENTMCNC: 28 PG — SIGNIFICANT CHANGE UP (ref 27–34)
MCHC RBC-ENTMCNC: 31.4 GM/DL — LOW (ref 32–36)
MCV RBC AUTO: 89.2 FL — SIGNIFICANT CHANGE UP (ref 80–100)
MONOCYTES # BLD AUTO: 0.46 K/UL — SIGNIFICANT CHANGE UP (ref 0–0.9)
MONOCYTES NFR BLD AUTO: 7.3 % — SIGNIFICANT CHANGE UP (ref 2–14)
NEUTROPHILS # BLD AUTO: 4.08 K/UL — SIGNIFICANT CHANGE UP (ref 1.8–7.4)
NEUTROPHILS NFR BLD AUTO: 64.3 % — SIGNIFICANT CHANGE UP (ref 43–77)
NRBC # BLD: 0 /100 WBCS — SIGNIFICANT CHANGE UP
NRBC # FLD: 0 K/UL — SIGNIFICANT CHANGE UP
PLATELET # BLD AUTO: 292 K/UL — SIGNIFICANT CHANGE UP (ref 150–400)
POTASSIUM SERPL-MCNC: 3.9 MMOL/L — SIGNIFICANT CHANGE UP (ref 3.5–5.3)
POTASSIUM SERPL-SCNC: 3.9 MMOL/L — SIGNIFICANT CHANGE UP (ref 3.5–5.3)
PROT SERPL-MCNC: 7.7 G/DL — SIGNIFICANT CHANGE UP (ref 6–8.3)
RBC # BLD: 4.43 M/UL — SIGNIFICANT CHANGE UP (ref 3.8–5.2)
RBC # FLD: 13.4 % — SIGNIFICANT CHANGE UP (ref 10.3–14.5)
SODIUM SERPL-SCNC: 137 MMOL/L — SIGNIFICANT CHANGE UP (ref 135–145)
WBC # BLD: 6.34 K/UL — SIGNIFICANT CHANGE UP (ref 3.8–10.5)
WBC # FLD AUTO: 6.34 K/UL — SIGNIFICANT CHANGE UP (ref 3.8–10.5)

## 2021-11-22 PROCEDURE — 74177 CT ABD & PELVIS W/CONTRAST: CPT | Mod: 26,MA

## 2021-11-22 PROCEDURE — 76830 TRANSVAGINAL US NON-OB: CPT | Mod: 26

## 2021-11-22 RX ORDER — MORPHINE SULFATE 50 MG/1
4 CAPSULE, EXTENDED RELEASE ORAL ONCE
Refills: 0 | Status: DISCONTINUED | OUTPATIENT
Start: 2021-11-22 | End: 2021-11-22

## 2021-11-22 RX ORDER — SODIUM CHLORIDE 9 MG/ML
1000 INJECTION INTRAMUSCULAR; INTRAVENOUS; SUBCUTANEOUS ONCE
Refills: 0 | Status: COMPLETED | OUTPATIENT
Start: 2021-11-22 | End: 2021-11-22

## 2021-11-22 RX ADMIN — SODIUM CHLORIDE 1000 MILLILITER(S): 9 INJECTION INTRAMUSCULAR; INTRAVENOUS; SUBCUTANEOUS at 00:35

## 2021-11-22 RX ADMIN — MORPHINE SULFATE 4 MILLIGRAM(S): 50 CAPSULE, EXTENDED RELEASE ORAL at 00:35

## 2021-11-22 NOTE — ED PROVIDER NOTE - CLINICAL SUMMARY MEDICAL DECISION MAKING FREE TEXT BOX
52 y/o F h/o HTN p/w diffuse abd pain x today. Pt reports worsening abd pain unable to pin point pain. Started having vaginal bleeding today, has not had menstrual cycle in over 5 months. Pt admits to having h/o fibroids. No exacerbating or alleviating factors. Pt denies n/v/d, dysuria, headache, dizziness.  plan  - labs  - ua/cx  - ctap  - tvus  - ivf  - pain control  - reassess

## 2021-11-22 NOTE — ED ADULT NURSE NOTE - OBJECTIVE STATEMENT
pt present to ed with vaginal bleeding and lower abdominal cramping, pt states she has not had her period for six months because she is per menopausal No

## 2021-11-22 NOTE — ED PROVIDER NOTE - PATIENT PORTAL LINK FT
You can access the FollowMyHealth Patient Portal offered by [facility] by registering at the following website: [email or web address]. By joining [facility]s FollowMyHealth portal, you will also be able to view your health information using other applications (apps) compatible with our system.

## 2021-11-22 NOTE — ED PROVIDER NOTE - OBJECTIVE STATEMENT
50 y/o F h/o HTN p/w diffuse abd pain x today. Pt reports worsening abd pain unable to pin point pain. Started having vaginal bleeding today, has not had menstrual cycle in over 5 months. Pt admits to having h/o fibroids. No exacerbating or alleviating factors. Pt denies n/v/d, dysuria, headache, dizziness.

## 2021-11-22 NOTE — ED ADULT NURSE NOTE - NSIMPLEMENTINTERV_GEN_ALL_ED
Implemented All Universal Safety Interventions:  [place] to call system. Call bell, personal items and telephone within reach. Instruct patient to call for assistance. Room bathroom lighting operational. Non-slip footwear when patient is off stretcher. Physically safe environment: no spills, clutter or unnecessary equipment. Stretcher in lowest position, wheels locked, appropriate side rails in place.

## 2021-11-22 NOTE — ED PROVIDER NOTE - GENITOURINARY, MLM
Chaperone JOSEMANUEL Shankar. Blood in vaginal vault. No active bleeding noted. No CMT. no adnexal tenderness.

## 2022-03-16 ENCOUNTER — EMERGENCY (EMERGENCY)
Facility: HOSPITAL | Age: 52
LOS: 1 days | Discharge: HOME | End: 2022-03-16
Attending: STUDENT IN AN ORGANIZED HEALTH CARE EDUCATION/TRAINING PROGRAM | Admitting: EMERGENCY MEDICINE
Payer: COMMERCIAL

## 2022-03-16 VITALS
HEART RATE: 73 BPM | RESPIRATION RATE: 18 BRPM | SYSTOLIC BLOOD PRESSURE: 130 MMHG | OXYGEN SATURATION: 100 % | TEMPERATURE: 98 F | DIASTOLIC BLOOD PRESSURE: 82 MMHG

## 2022-03-16 PROCEDURE — 72125 CT NECK SPINE W/O DYE: CPT | Mod: 26,MB

## 2022-03-16 PROCEDURE — 99285 EMERGENCY DEPT VISIT HI MDM: CPT

## 2022-03-16 PROCEDURE — 70450 CT HEAD/BRAIN W/O DYE: CPT | Mod: 26,MB

## 2022-03-16 RX ORDER — ACETAMINOPHEN 500 MG
650 TABLET ORAL ONCE
Refills: 0 | Status: COMPLETED | OUTPATIENT
Start: 2022-03-16 | End: 2022-03-16

## 2022-03-16 RX ORDER — IBUPROFEN 200 MG
400 TABLET ORAL ONCE
Refills: 0 | Status: COMPLETED | OUTPATIENT
Start: 2022-03-16 | End: 2022-03-16

## 2022-03-16 RX ORDER — DIAZEPAM 5 MG
5 TABLET ORAL ONCE
Refills: 0 | Status: DISCONTINUED | OUTPATIENT
Start: 2022-03-16 | End: 2022-03-16

## 2022-03-16 RX ADMIN — Medication 400 MILLIGRAM(S): at 16:08

## 2022-03-16 RX ADMIN — Medication 5 MILLIGRAM(S): at 16:09

## 2022-03-16 RX ADMIN — Medication 650 MILLIGRAM(S): at 16:08
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
## 2022-03-16 NOTE — ED PROVIDER NOTE - NS ED ROS FT
CONSTITUTIONAL: No fever,  Pulmonary: no cough  CARDIOVASCULAR: No chest pain,  GI: No abdominal pain  MUSKULOSKELETAL: +pain in neck (center) with radiation to the left shoulder   ALL OTHER SYSTEMS NEGATIVE.

## 2022-03-16 NOTE — ED PROVIDER NOTE - NSFOLLOWUPINSTRUCTIONS_ED_ALL_ED_FT
Follow up with your PMD within 24-48 hrs. Show copies of your reports given to you. Take all of your medications as previously prescribed.    If you have any new, worsened or concerning symptoms, please return to the emergency department immediately.

## 2022-03-16 NOTE — ED PROVIDER NOTE - PHYSICAL EXAMINATION
CONSTITUTIONAL: Non-toxic, non-diaphoretic, in no apparent distress  HEAD: Normocephalic; atruamatic  EYES: EOM intact   ENMT: External appears normal; normal oropharynx, moist  NECK: grossly normal active ROM,  CARD: No cyanosis, good peripheral perfusion  RESP: Normal chest excursion with respiration; no increased work of breathing  ABD: non-distended   EXT: moving all extremities, no gross disfigurement or asymmetry,  SKIN: Warm, dry, no rash  NEURO:  moving all extremities, no facial droop, no dysarthria      cn2-12 intact  5/5 all extremities  trauma: midline tenderness to palpation around C4 to C5, no humeral head or shaft tenderness to palpation bilaterally, no condylar tenderness to palpation bilaterally, no scaphoid tenderness to palpation bilaterally, no knee tenderness to palpation bilaterally, pelvis stable, [place] negative bilaterally,
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
## 2022-03-16 NOTE — ED PROVIDER NOTE - PATIENT PORTAL LINK FT
You can access the FollowMyHealth Patient Portal offered by [facility] by registering at the following website: [email or web address]. By joining [facility]’s FollowMyHealth portal, you will also be able to view your health information using other applications (apps) compatible with our system.

## 2022-03-16 NOTE — ED PROVIDER NOTE - PROGRESS NOTE DETAILS
AARON Thompson: Pt reassessed, states she feels better. Imaging studies reviewed - negative. Stable for dc home. PMD follow up. STrict return precautions.
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
## 2022-04-11 ENCOUNTER — APPOINTMENT (OUTPATIENT)
Dept: ORTHOPEDIC SURGERY | Facility: CLINIC | Age: 52
End: 2022-04-11
Payer: COMMERCIAL

## 2022-04-11 VITALS — WEIGHT: 174 LBS | BODY MASS INDEX: 32.02 KG/M2 | HEIGHT: 62 IN

## 2022-04-11 DIAGNOSIS — Z86.79 PERSONAL HISTORY OF OTHER DISEASES OF THE CIRCULATORY SYSTEM: ICD-10-CM

## 2022-04-11 DIAGNOSIS — Z78.9 OTHER SPECIFIED HEALTH STATUS: ICD-10-CM

## 2022-04-11 PROCEDURE — 20611 DRAIN/INJ JOINT/BURSA W/US: CPT | Mod: RT

## 2022-04-11 PROCEDURE — 99213 OFFICE O/P EST LOW 20 MIN: CPT | Mod: 25

## 2022-04-11 NOTE — HISTORY OF PRESENT ILLNESS
[7] : 7 [Dull/Aching] : dull/aching [Full time] : Work status: full time [de-identified] : Patient returns  to start Orthovisc series for both knees.  [FreeTextEntry1] : bilat knees

## 2022-04-11 NOTE — PROCEDURE
[Pain] : pain [Inflammation] : inflammation [FreeTextEntry3] : Orthovisc (Large Joint) with Ultrasound Guidance\par Viscosupplementation Injection: X-ray evidence of Osteoarthritis on this or prior visit and Patient has tried OTC's including aspirin, Ibuprofen, Aleve etc or prescription NSAIDS, and/or exercises at home and/ or physical therapy without satisfactory response. \par An injection of Orthovisc 2ml #1 was injected into the bilateral knee(s). after verbal consent using sterile technique. The risks, benefits, and alternatives to Viscosupplementation injection were explained in full to the patient. Risks outlined include but are not limited to infection, sepsis, bleeding, scarring, skin discoloration, temporary increase in pain, syncopal episode, failure to resolve symptoms, allergic reaction, and symptom recurrence. Signs and symptoms of infection reviewed and patient advised to call immediately for redness, fevers, and/or chills. Patient understood the risks. All questions were answered. After discussion of options, patient requested Viscosupplementation. Oral informed consent was obtained and sterile prep was done of the injection site. Sterile technique was used without complications. The patient tolerated the procedure well. Ice tonight to the injection site. \par \par Ultrasound Guidance was used for the following reasons: prior failure or difficult injection. \par \par Ultrasound guided injection was performed of the knee, visualization of the needle and placement of injection was performed without complication. \par \par

## 2022-04-11 NOTE — PHYSICAL EXAM
[Bilateral] : knee bilaterally [5___] : hamstring 5[unfilled]/5 [] : no erythema [TWNoteComboBox7] : flexion 130 degrees [de-identified] : extension 0 degrees

## 2022-04-11 NOTE — DISCUSSION/SUMMARY
[Medication Risks Reviewed] : Medication risks reviewed [de-identified] : ice, rest, activity modification.

## 2022-04-18 ENCOUNTER — APPOINTMENT (OUTPATIENT)
Dept: ORTHOPEDIC SURGERY | Facility: CLINIC | Age: 52
End: 2022-04-18

## 2022-04-25 ENCOUNTER — APPOINTMENT (OUTPATIENT)
Dept: ORTHOPEDIC SURGERY | Facility: CLINIC | Age: 52
End: 2022-04-25
Payer: COMMERCIAL

## 2022-04-25 ENCOUNTER — APPOINTMENT (OUTPATIENT)
Dept: ORTHOPEDIC SURGERY | Facility: CLINIC | Age: 52
End: 2022-04-25

## 2022-04-25 PROCEDURE — 20611 DRAIN/INJ JOINT/BURSA W/US: CPT | Mod: 50

## 2022-04-25 PROCEDURE — 99212 OFFICE O/P EST SF 10 MIN: CPT | Mod: 25

## 2022-04-25 NOTE — PROCEDURE
[Pain] : pain [Inflammation] : inflammation [FreeTextEntry3] : Orthovisc (Large Joint) with Ultrasound Guidance\par Viscosupplementation Injection: X-ray evidence of Osteoarthritis on this or prior visit and Patient has tried OTC's including aspirin, Ibuprofen, Aleve etc or prescription NSAIDS, and/or exercises at home and/ or physical therapy without satisfactory response. \par An injection of Orthovisc 2ml #2 was injected into the bilateral knee(s). after verbal consent using sterile technique. The risks, benefits, and alternatives to Viscosupplementation injection were explained in full to the patient. Risks outlined include but are not limited to infection, sepsis, bleeding, scarring, skin discoloration, temporary increase in pain, syncopal episode, failure to resolve symptoms, allergic reaction, and symptom recurrence. Signs and symptoms of infection reviewed and patient advised to call immediately for redness, fevers, and/or chills. Patient understood the risks. All questions were answered. After discussion of options, patient requested Viscosupplementation. Oral informed consent was obtained and sterile prep was done of the injection site. Sterile technique was used without complications. The patient tolerated the procedure well. Ice tonight to the injection site. \par \par Ultrasound Guidance was used for the following reasons: prior failure or difficult injection. \par \par Ultrasound guided injection was performed of the knee, visualization of the needle and placement of injection was performed without complication. \par \par

## 2022-04-25 NOTE — DISCUSSION/SUMMARY
[Medication Risks Reviewed] : Medication risks reviewed [de-identified] : ice, rest, activity modification.

## 2022-04-25 NOTE — PHYSICAL EXAM
[Bilateral] : knee bilaterally [5___] : hamstring 5[unfilled]/5 [] : no erythema [TWNoteComboBox7] : flexion 130 degrees [de-identified] : extension 0 degrees

## 2022-04-25 NOTE — HISTORY OF PRESENT ILLNESS
[7] : 7 [Dull/Aching] : dull/aching [Full time] : Work status: full time [FreeTextEntry1] : bilat knees [de-identified] : Patient returns for Orthovisc #2 bilateral knees.

## 2022-05-09 ENCOUNTER — APPOINTMENT (OUTPATIENT)
Dept: ORTHOPEDIC SURGERY | Facility: CLINIC | Age: 52
End: 2022-05-09

## 2022-05-11 ENCOUNTER — APPOINTMENT (OUTPATIENT)
Dept: ORTHOPEDIC SURGERY | Facility: CLINIC | Age: 52
End: 2022-05-11

## 2022-05-11 VITALS — HEIGHT: 62 IN | BODY MASS INDEX: 32.02 KG/M2 | WEIGHT: 174 LBS

## 2022-05-16 ENCOUNTER — APPOINTMENT (OUTPATIENT)
Dept: ORTHOPEDIC SURGERY | Facility: CLINIC | Age: 52
End: 2022-05-16
Payer: COMMERCIAL

## 2022-05-16 PROCEDURE — 99213 OFFICE O/P EST LOW 20 MIN: CPT
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
## 2022-05-16 NOTE — ASSESSMENT
[FreeTextEntry1] : Patient is doing much better.  She will continue with PT and home exercises.  Supportive shoes are recommended.\par Ice/nsaids prn.

## 2022-05-16 NOTE — PHYSICAL EXAM
[Left] : left foot and ankle [NL (40)] : plantar flexion 40 degrees [NL 30)] : inversion 30 degrees [NL (20)] : eversion 20 degrees [4___] : inversion 4[unfilled]/5 [5___] : eversion 5[unfilled]/5 [2+] : posterior tibialis pulse: 2+ [Normal] : saphenous nerve sensation normal [] : patient ambulates without assistive device [FreeTextEntry3] : Minimal medial swelling. [FreeTextEntry8] : Minimal posterior tibial tendon tenderness. [de-identified] : Minimal pain with single heel rise.

## 2022-05-17 ENCOUNTER — APPOINTMENT (OUTPATIENT)
Dept: ORTHOPEDIC SURGERY | Facility: CLINIC | Age: 52
End: 2022-05-17
Payer: COMMERCIAL

## 2022-05-17 VITALS — WEIGHT: 174 LBS | HEIGHT: 62 IN | BODY MASS INDEX: 32.02 KG/M2

## 2022-05-17 PROCEDURE — 20611 DRAIN/INJ JOINT/BURSA W/US: CPT | Mod: 50

## 2022-05-17 PROCEDURE — 76942 ECHO GUIDE FOR BIOPSY: CPT | Mod: 59,RT

## 2022-05-17 PROCEDURE — 99213 OFFICE O/P EST LOW 20 MIN: CPT | Mod: 25

## 2022-05-17 NOTE — PROCEDURE
[Pain] : pain [Inflammation] : inflammation [FreeTextEntry3] : Orthovisc (Large Joint) with Ultrasound Guidance\par Viscosupplementation Injection: X-ray evidence of Osteoarthritis on this or prior visit and Patient has tried OTC's including aspirin, Ibuprofen, Aleve etc or prescription NSAIDS, and/or exercises at home and/ or physical therapy without satisfactory response. \par An injection of Orthovisc 2ml #3 was injected into the bilateral knee(s). after verbal consent using sterile technique. The risks, benefits, and alternatives to Viscosupplementation injection were explained in full to the patient. Risks outlined include but are not limited to infection, sepsis, bleeding, scarring, skin discoloration, temporary increase in pain, syncopal episode, failure to resolve symptoms, allergic reaction, and symptom recurrence. Signs and symptoms of infection reviewed and patient advised to call immediately for redness, fevers, and/or chills. Patient understood the risks. All questions were answered. After discussion of options, patient requested Viscosupplementation. Oral informed consent was obtained and sterile prep was done of the injection site. Sterile technique was used without complications. The patient tolerated the procedure well. Ice tonight to the injection site. \par \par Ultrasound Guidance was used for the following reasons: prior failure or difficult injection. \par \par Ultrasound guided injection was performed of the knee, visualization of the needle and placement of injection was performed without complication. \par \par

## 2022-05-17 NOTE — PHYSICAL EXAM
[Bilateral] : knee bilaterally [5___] : hamstring 5[unfilled]/5 [] : no erythema [TWNoteComboBox7] : flexion 130 degrees [de-identified] : extension 0 degrees

## 2022-05-17 NOTE — DISCUSSION/SUMMARY
[Medication Risks Reviewed] : Medication risks reviewed [de-identified] : ice, rest, activity modification.

## 2022-05-17 NOTE — HISTORY OF PRESENT ILLNESS
[3] : 3 [Orthovisc] : Orthovisc [7] : 7 [Dull/Aching] : dull/aching [Full time] : Work status: full time [] : no [FreeTextEntry1] : bilat knees [de-identified] : 04/25/2022 [de-identified] : bilateral knees [de-identified] : Patient returns  to continue Orthovisc series for both knees.

## 2022-05-24 ENCOUNTER — APPOINTMENT (OUTPATIENT)
Dept: ORTHOPEDIC SURGERY | Facility: CLINIC | Age: 52
End: 2022-05-24
Payer: COMMERCIAL

## 2022-05-24 DIAGNOSIS — M17.11 UNILATERAL PRIMARY OSTEOARTHRITIS, RIGHT KNEE: ICD-10-CM

## 2022-05-24 DIAGNOSIS — M17.12 UNILATERAL PRIMARY OSTEOARTHRITIS, LEFT KNEE: ICD-10-CM

## 2022-05-24 PROCEDURE — 99212 OFFICE O/P EST SF 10 MIN: CPT | Mod: 25

## 2022-05-24 PROCEDURE — 20611 DRAIN/INJ JOINT/BURSA W/US: CPT | Mod: LT

## 2022-05-24 NOTE — HISTORY OF PRESENT ILLNESS
[Orthovisc] : Orthovisc [4] : 4 [] : no [FreeTextEntry1] : bilat knees [de-identified] : 05/17/2022 [de-identified] : bilateral knees [de-identified] : Patient returns  to continue Orthovisc series for both knees.

## 2022-05-24 NOTE — PHYSICAL EXAM
[Bilateral] : knee bilaterally [5___] : hamstring 5[unfilled]/5 [] : no erythema [TWNoteComboBox7] : flexion 130 degrees [de-identified] : extension 0 degrees

## 2022-05-24 NOTE — DISCUSSION/SUMMARY
[Medication Risks Reviewed] : Medication risks reviewed [de-identified] : ice, rest, activity modification.

## 2022-05-24 NOTE — PROCEDURE
[FreeTextEntry3] : Orthovisc (Large Joint) with Ultrasound Guidance\par Viscosupplementation Injection: X-ray evidence of Osteoarthritis on this or prior visit and Patient has tried OTC's including aspirin, Ibuprofen, Aleve etc or prescription NSAIDS, and/or exercises at home and/ or physical therapy without satisfactory response. \par An injection of Orthovisc 2ml #4 was injected into the bilateral knee(s). after verbal consent using sterile technique. The risks, benefits, and alternatives to Viscosupplementation injection were explained in full to the patient. Risks outlined include but are not limited to infection, sepsis, bleeding, scarring, skin discoloration, temporary increase in pain, syncopal episode, failure to resolve symptoms, allergic reaction, and symptom recurrence. Signs and symptoms of infection reviewed and patient advised to call immediately for redness, fevers, and/or chills. Patient understood the risks. All questions were answered. After discussion of options, patient requested Viscosupplementation. Oral informed consent was obtained and sterile prep was done of the injection site. Sterile technique was used without complications. The patient tolerated the procedure well. Ice tonight to the injection site. \par \par Ultrasound Guidance was used for the following reasons: prior failure or difficult injection. \par \par Ultrasound guided injection was performed of the knee, visualization of the needle and placement of injection was performed without complication. \par \par

## 2022-06-20 ENCOUNTER — APPOINTMENT (OUTPATIENT)
Dept: ORTHOPEDIC SURGERY | Facility: CLINIC | Age: 52
End: 2022-06-20
Payer: COMMERCIAL

## 2022-06-20 PROCEDURE — 99213 OFFICE O/P EST LOW 20 MIN: CPT

## 2022-06-20 NOTE — ASSESSMENT
[FreeTextEntry1] : She continues to make gains with PT. Will continue.\par Recommend WBAT in supportive shoes.\par Follow-up 1 month.

## 2022-06-20 NOTE — HISTORY OF PRESENT ILLNESS
[Leisure] : leisure [Rest] : rest [Sitting] : sitting [Standing] : standing [Walking] : walking [Stairs] : stairs [de-identified] : Patient returns for her left posterior tibial tendonitis. Symptoms since November, 2021. She has been going to PT and is doing very well. She is doing a HEP. WBAT without pain in slip on shoes.  [] : Post Surgical Visit: no [FreeTextEntry1] : L ankle

## 2022-06-20 NOTE — PHYSICAL EXAM
[Left] : left foot and ankle [NL (40)] : plantar flexion 40 degrees [NL 30)] : inversion 30 degrees [NL (20)] : eversion 20 degrees [4___] : inversion 4[unfilled]/5 [5___] : eversion 5[unfilled]/5 [2+] : posterior tibialis pulse: 2+ [Normal] : saphenous nerve sensation normal [] : non-antalgic [FreeTextEntry3] : Minimal medial swelling. [FreeTextEntry8] : Very mild posterior tibial tendon tenderness. [de-identified] : Mild pain with SHR.

## 2022-07-18 ENCOUNTER — APPOINTMENT (OUTPATIENT)
Dept: ORTHOPEDIC SURGERY | Facility: CLINIC | Age: 52
End: 2022-07-18

## 2022-07-18 DIAGNOSIS — M76.822 POSTERIOR TIBIAL TENDINITIS, LEFT LEG: ICD-10-CM

## 2022-07-18 PROCEDURE — 99213 OFFICE O/P EST LOW 20 MIN: CPT

## 2022-07-18 NOTE — HISTORY OF PRESENT ILLNESS
[Leisure] : leisure [Rest] : rest [Sitting] : sitting [Standing] : standing [Walking] : walking [Stairs] : stairs [de-identified] : Patient returns for her left posterior tibial tendonitis. Symptoms since November, 2021. She has been going to PT and reports some improvement, but still has some pain.  She presents today wearing flat sandals. [] : Post Surgical Visit: no [FreeTextEntry1] : L ankle

## 2022-07-18 NOTE — ASSESSMENT
[FreeTextEntry1] : Patient is making slow progress.  Supportive shoes with an arch are again recommended.\par She will continue with PT and home exercises.\par Ice/nsaids prn.

## 2022-08-29 ENCOUNTER — APPOINTMENT (OUTPATIENT)
Dept: ORTHOPEDIC SURGERY | Facility: CLINIC | Age: 52
End: 2022-08-29

## 2022-11-28 ENCOUNTER — APPOINTMENT (OUTPATIENT)
Dept: INTERNAL MEDICINE | Facility: CLINIC | Age: 52
End: 2022-11-28

## 2022-11-28 VITALS
HEART RATE: 74 BPM | BODY MASS INDEX: 32.57 KG/M2 | WEIGHT: 177 LBS | SYSTOLIC BLOOD PRESSURE: 160 MMHG | OXYGEN SATURATION: 99 % | HEIGHT: 62 IN | DIASTOLIC BLOOD PRESSURE: 99 MMHG

## 2022-11-28 DIAGNOSIS — Z00.00 ENCOUNTER FOR GENERAL ADULT MEDICAL EXAMINATION W/OUT ABNORMAL FINDINGS: ICD-10-CM

## 2022-11-28 PROCEDURE — 99386 PREV VISIT NEW AGE 40-64: CPT | Mod: 25

## 2022-11-28 PROCEDURE — 93000 ELECTROCARDIOGRAM COMPLETE: CPT | Mod: 59

## 2022-11-28 PROCEDURE — G0444 DEPRESSION SCREEN ANNUAL: CPT | Mod: 59

## 2022-11-29 LAB
25(OH)D3 SERPL-MCNC: 20.7 NG/ML
ALBUMIN SERPL ELPH-MCNC: 4.1 G/DL
ALP BLD-CCNC: 77 U/L
ALT SERPL-CCNC: 10 U/L
ANION GAP SERPL CALC-SCNC: 11 MMOL/L
APO B SERPL-MCNC: 76 MG/DL
AST SERPL-CCNC: 18 U/L
BASOPHILS # BLD AUTO: 0.04 K/UL
BASOPHILS NFR BLD AUTO: 0.7 %
BILIRUB SERPL-MCNC: 0.2 MG/DL
BUN SERPL-MCNC: 12 MG/DL
CALCIUM SERPL-MCNC: 9.8 MG/DL
CHLORIDE SERPL-SCNC: 105 MMOL/L
CHOLEST SERPL-MCNC: 177 MG/DL
CO2 SERPL-SCNC: 23 MMOL/L
CREAT SERPL-MCNC: 0.8 MG/DL
EGFR: 89 ML/MIN/1.73M2
EOSINOPHIL # BLD AUTO: 0.04 K/UL
EOSINOPHIL NFR BLD AUTO: 0.7 %
ESTIMATED AVERAGE GLUCOSE: 117 MG/DL
GLUCOSE SERPL-MCNC: 84 MG/DL
HBA1C MFR BLD HPLC: 5.7 %
HCT VFR BLD CALC: 40.7 %
HCV AB SER QL: NONREACTIVE
HCV S/CO RATIO: 0.11 S/CO
HDLC SERPL-MCNC: 62 MG/DL
HGB BLD-MCNC: 12.8 G/DL
HIV1+2 AB SPEC QL IA.RAPID: NONREACTIVE
IMM GRANULOCYTES NFR BLD AUTO: 0.2 %
LDLC SERPL CALC-MCNC: 93 MG/DL
LYMPHOCYTES # BLD AUTO: 2.48 K/UL
LYMPHOCYTES NFR BLD AUTO: 43 %
MAN DIFF?: NORMAL
MCHC RBC-ENTMCNC: 26.9 PG
MCHC RBC-ENTMCNC: 31.4 GM/DL
MCV RBC AUTO: 85.5 FL
MONOCYTES # BLD AUTO: 0.42 K/UL
MONOCYTES NFR BLD AUTO: 7.3 %
NEUTROPHILS # BLD AUTO: 2.78 K/UL
NEUTROPHILS NFR BLD AUTO: 48.1 %
NONHDLC SERPL-MCNC: 116 MG/DL
PLATELET # BLD AUTO: 315 K/UL
POTASSIUM SERPL-SCNC: 4.4 MMOL/L
PROT SERPL-MCNC: 7.6 G/DL
RBC # BLD: 4.76 M/UL
RBC # FLD: 13.6 %
SODIUM SERPL-SCNC: 140 MMOL/L
TRIGL SERPL-MCNC: 112 MG/DL
TSH SERPL-ACNC: 3.05 UIU/ML
VIT B12 SERPL-MCNC: 582 PG/ML
WBC # FLD AUTO: 5.77 K/UL

## 2022-12-07 ENCOUNTER — APPOINTMENT (OUTPATIENT)
Dept: ULTRASOUND IMAGING | Facility: CLINIC | Age: 52
End: 2022-12-07

## 2022-12-07 ENCOUNTER — APPOINTMENT (OUTPATIENT)
Dept: MAMMOGRAPHY | Facility: CLINIC | Age: 52
End: 2022-12-07

## 2022-12-26 ENCOUNTER — APPOINTMENT (OUTPATIENT)
Dept: ULTRASOUND IMAGING | Facility: CLINIC | Age: 52
End: 2022-12-26

## 2022-12-26 ENCOUNTER — APPOINTMENT (OUTPATIENT)
Dept: MAMMOGRAPHY | Facility: CLINIC | Age: 52
End: 2022-12-26

## 2023-01-17 ENCOUNTER — APPOINTMENT (OUTPATIENT)
Dept: OBGYN | Facility: CLINIC | Age: 53
End: 2023-01-17

## 2023-02-07 ENCOUNTER — APPOINTMENT (OUTPATIENT)
Dept: GASTROENTEROLOGY | Facility: CLINIC | Age: 53
End: 2023-02-07

## 2023-02-28 ENCOUNTER — APPOINTMENT (OUTPATIENT)
Dept: INTERNAL MEDICINE | Facility: CLINIC | Age: 53
End: 2023-02-28
Payer: COMMERCIAL

## 2023-02-28 VITALS
HEIGHT: 62 IN | BODY MASS INDEX: 33.31 KG/M2 | TEMPERATURE: 97.4 F | WEIGHT: 181 LBS | OXYGEN SATURATION: 98 % | SYSTOLIC BLOOD PRESSURE: 138 MMHG | HEART RATE: 84 BPM | DIASTOLIC BLOOD PRESSURE: 84 MMHG

## 2023-02-28 LAB — HBA1C MFR BLD HPLC: 5.6

## 2023-02-28 PROCEDURE — 99214 OFFICE O/P EST MOD 30 MIN: CPT

## 2023-02-28 PROCEDURE — 83036 HEMOGLOBIN GLYCOSYLATED A1C: CPT | Mod: QW

## 2023-03-24 ENCOUNTER — APPOINTMENT (OUTPATIENT)
Dept: GASTROENTEROLOGY | Facility: CLINIC | Age: 53
End: 2023-03-24

## 2023-03-28 ENCOUNTER — APPOINTMENT (OUTPATIENT)
Dept: GASTROENTEROLOGY | Facility: CLINIC | Age: 53
End: 2023-03-28
Payer: COMMERCIAL

## 2023-03-28 PROCEDURE — 45378 DIAGNOSTIC COLONOSCOPY: CPT

## 2023-06-05 ENCOUNTER — RESULT REVIEW (OUTPATIENT)
Age: 53
End: 2023-06-05

## 2023-06-05 ENCOUNTER — APPOINTMENT (OUTPATIENT)
Dept: INTERNAL MEDICINE | Facility: CLINIC | Age: 53
End: 2023-06-05
Payer: COMMERCIAL

## 2023-06-05 VITALS
HEIGHT: 62 IN | DIASTOLIC BLOOD PRESSURE: 80 MMHG | SYSTOLIC BLOOD PRESSURE: 130 MMHG | WEIGHT: 180 LBS | HEART RATE: 82 BPM | BODY MASS INDEX: 33.13 KG/M2 | OXYGEN SATURATION: 98 %

## 2023-06-05 DIAGNOSIS — M25.519 PAIN IN UNSPECIFIED SHOULDER: ICD-10-CM

## 2023-06-05 DIAGNOSIS — M25.561 PAIN IN RIGHT KNEE: ICD-10-CM

## 2023-06-05 DIAGNOSIS — M25.562 PAIN IN RIGHT KNEE: ICD-10-CM

## 2023-06-05 PROCEDURE — 36415 COLL VENOUS BLD VENIPUNCTURE: CPT

## 2023-06-05 PROCEDURE — 99214 OFFICE O/P EST MOD 30 MIN: CPT | Mod: 25

## 2023-06-09 LAB
ALBUMIN SERPL ELPH-MCNC: 4.3 G/DL
ALP BLD-CCNC: 88 U/L
ALT SERPL-CCNC: 10 U/L
ANION GAP SERPL CALC-SCNC: 10 MMOL/L
AST SERPL-CCNC: 14 U/L
BILIRUB SERPL-MCNC: 0.2 MG/DL
BUN SERPL-MCNC: 14 MG/DL
CALCIUM SERPL-MCNC: 9.7 MG/DL
CHLORIDE SERPL-SCNC: 107 MMOL/L
CO2 SERPL-SCNC: 25 MMOL/L
CREAT SERPL-MCNC: 0.74 MG/DL
EGFR: 98 ML/MIN/1.73M2
ESTIMATED AVERAGE GLUCOSE: 120 MG/DL
GLUCOSE SERPL-MCNC: 106 MG/DL
HBA1C MFR BLD HPLC: 5.8 %
POTASSIUM SERPL-SCNC: 4.4 MMOL/L
PROT SERPL-MCNC: 7.2 G/DL
SODIUM SERPL-SCNC: 142 MMOL/L

## 2023-06-28 ENCOUNTER — APPOINTMENT (OUTPATIENT)
Dept: INTERNAL MEDICINE | Facility: CLINIC | Age: 53
End: 2023-06-28
Payer: COMMERCIAL

## 2023-06-28 VITALS
DIASTOLIC BLOOD PRESSURE: 86 MMHG | WEIGHT: 178 LBS | SYSTOLIC BLOOD PRESSURE: 131 MMHG | OXYGEN SATURATION: 98 % | HEART RATE: 78 BPM | BODY MASS INDEX: 32.56 KG/M2

## 2023-06-28 PROCEDURE — 99214 OFFICE O/P EST MOD 30 MIN: CPT

## 2023-07-10 ENCOUNTER — RESULT REVIEW (OUTPATIENT)
Age: 53
End: 2023-07-10

## 2023-07-10 ENCOUNTER — APPOINTMENT (OUTPATIENT)
Dept: RADIOLOGY | Facility: CLINIC | Age: 53
End: 2023-07-10
Payer: COMMERCIAL

## 2023-07-10 ENCOUNTER — APPOINTMENT (OUTPATIENT)
Dept: MAMMOGRAPHY | Facility: CLINIC | Age: 53
End: 2023-07-10
Payer: COMMERCIAL

## 2023-07-10 PROCEDURE — 73564 X-RAY EXAM KNEE 4 OR MORE: CPT | Mod: 50

## 2023-07-10 PROCEDURE — 77067 SCR MAMMO BI INCL CAD: CPT

## 2023-07-10 PROCEDURE — 73030 X-RAY EXAM OF SHOULDER: CPT | Mod: LT

## 2023-07-10 PROCEDURE — 77063 BREAST TOMOSYNTHESIS BI: CPT

## 2023-07-17 ENCOUNTER — NON-APPOINTMENT (OUTPATIENT)
Age: 53
End: 2023-07-17

## 2023-07-19 ENCOUNTER — RESULT REVIEW (OUTPATIENT)
Age: 53
End: 2023-07-19

## 2023-07-19 ENCOUNTER — APPOINTMENT (OUTPATIENT)
Dept: ULTRASOUND IMAGING | Facility: IMAGING CENTER | Age: 53
End: 2023-07-19
Payer: COMMERCIAL

## 2023-07-19 ENCOUNTER — OUTPATIENT (OUTPATIENT)
Dept: OUTPATIENT SERVICES | Facility: HOSPITAL | Age: 53
LOS: 1 days | End: 2023-07-19
Payer: COMMERCIAL

## 2023-07-19 DIAGNOSIS — Z00.8 ENCOUNTER FOR OTHER GENERAL EXAMINATION: ICD-10-CM

## 2023-07-19 PROCEDURE — 76641 ULTRASOUND BREAST COMPLETE: CPT | Mod: 26,50

## 2023-07-19 PROCEDURE — 76641 ULTRASOUND BREAST COMPLETE: CPT

## 2023-07-20 ENCOUNTER — APPOINTMENT (OUTPATIENT)
Dept: OBGYN | Facility: CLINIC | Age: 53
End: 2023-07-20

## 2023-08-07 ENCOUNTER — APPOINTMENT (OUTPATIENT)
Dept: RADIOLOGY | Facility: CLINIC | Age: 53
End: 2023-08-07

## 2023-10-02 ENCOUNTER — APPOINTMENT (OUTPATIENT)
Dept: INTERNAL MEDICINE | Facility: CLINIC | Age: 53
End: 2023-10-02
Payer: COMMERCIAL

## 2023-10-02 ENCOUNTER — APPOINTMENT (OUTPATIENT)
Dept: INTERNAL MEDICINE | Facility: CLINIC | Age: 53
End: 2023-10-02

## 2023-10-02 DIAGNOSIS — M54.9 DORSALGIA, UNSPECIFIED: ICD-10-CM

## 2023-10-02 PROCEDURE — 99443: CPT

## 2023-10-02 RX ORDER — SEMAGLUTIDE 0.68 MG/ML
2 INJECTION, SOLUTION SUBCUTANEOUS
Qty: 1 | Refills: 3 | Status: DISCONTINUED | COMMUNITY
Start: 2023-06-05 | End: 2023-10-02

## 2024-01-26 ENCOUNTER — INPATIENT (INPATIENT)
Facility: HOSPITAL | Age: 54
LOS: 5 days | Discharge: HOME HEALTH | End: 2024-02-01
Attending: HOSPITALIST | Admitting: HOSPITALIST
Payer: COMMERCIAL

## 2024-01-26 VITALS
HEART RATE: 78 BPM | OXYGEN SATURATION: 100 % | DIASTOLIC BLOOD PRESSURE: 116 MMHG | RESPIRATION RATE: 22 BRPM | SYSTOLIC BLOOD PRESSURE: 189 MMHG

## 2024-01-26 DIAGNOSIS — I16.0 HYPERTENSIVE URGENCY: ICD-10-CM

## 2024-01-26 DIAGNOSIS — Z29.9 ENCOUNTER FOR PROPHYLACTIC MEASURES, UNSPECIFIED: ICD-10-CM

## 2024-01-26 DIAGNOSIS — E04.2 NONTOXIC MULTINODULAR GOITER: ICD-10-CM

## 2024-01-26 DIAGNOSIS — R56.9 UNSPECIFIED CONVULSIONS: ICD-10-CM

## 2024-01-26 DIAGNOSIS — I10 ESSENTIAL (PRIMARY) HYPERTENSION: ICD-10-CM

## 2024-01-26 LAB
ALBUMIN SERPL ELPH-MCNC: 4.1 G/DL — SIGNIFICANT CHANGE UP (ref 3.3–5)
ALBUMIN SERPL ELPH-MCNC: 4.4 G/DL — SIGNIFICANT CHANGE UP (ref 3.3–5)
ALP SERPL-CCNC: 102 U/L — SIGNIFICANT CHANGE UP (ref 40–120)
ALP SERPL-CCNC: 99 U/L — SIGNIFICANT CHANGE UP (ref 40–120)
ALT FLD-CCNC: 11 U/L — SIGNIFICANT CHANGE UP (ref 4–33)
ALT FLD-CCNC: 14 U/L — SIGNIFICANT CHANGE UP (ref 4–33)
AMPHET UR-MCNC: NEGATIVE — SIGNIFICANT CHANGE UP
ANION GAP SERPL CALC-SCNC: 14 MMOL/L — SIGNIFICANT CHANGE UP (ref 7–14)
ANION GAP SERPL CALC-SCNC: 15 MMOL/L — HIGH (ref 7–14)
APAP SERPL-MCNC: <10 UG/ML — LOW (ref 15–25)
APTT BLD: 34.8 SEC — SIGNIFICANT CHANGE UP (ref 24.5–35.6)
AST SERPL-CCNC: 18 U/L — SIGNIFICANT CHANGE UP (ref 4–32)
AST SERPL-CCNC: 26 U/L — SIGNIFICANT CHANGE UP (ref 4–32)
BARBITURATES UR SCN-MCNC: NEGATIVE — SIGNIFICANT CHANGE UP
BASE EXCESS BLDV CALC-SCNC: 2.9 MMOL/L — SIGNIFICANT CHANGE UP (ref -2–3)
BASOPHILS # BLD AUTO: 0.03 K/UL — SIGNIFICANT CHANGE UP (ref 0–0.2)
BASOPHILS NFR BLD AUTO: 0.5 % — SIGNIFICANT CHANGE UP (ref 0–2)
BENZODIAZ UR-MCNC: NEGATIVE — SIGNIFICANT CHANGE UP
BILIRUB SERPL-MCNC: 0.3 MG/DL — SIGNIFICANT CHANGE UP (ref 0.2–1.2)
BILIRUB SERPL-MCNC: 0.4 MG/DL — SIGNIFICANT CHANGE UP (ref 0.2–1.2)
BLOOD GAS VENOUS COMPREHENSIVE RESULT: SIGNIFICANT CHANGE UP
BUN SERPL-MCNC: 12 MG/DL — SIGNIFICANT CHANGE UP (ref 7–23)
BUN SERPL-MCNC: 12 MG/DL — SIGNIFICANT CHANGE UP (ref 7–23)
CALCIUM SERPL-MCNC: 10 MG/DL — SIGNIFICANT CHANGE UP (ref 8.4–10.5)
CALCIUM SERPL-MCNC: 9.7 MG/DL — SIGNIFICANT CHANGE UP (ref 8.4–10.5)
CHLORIDE BLDV-SCNC: 104 MMOL/L — SIGNIFICANT CHANGE UP (ref 96–108)
CHLORIDE SERPL-SCNC: 102 MMOL/L — SIGNIFICANT CHANGE UP (ref 98–107)
CHLORIDE SERPL-SCNC: 102 MMOL/L — SIGNIFICANT CHANGE UP (ref 98–107)
CO2 BLDV-SCNC: 31.5 MMOL/L — HIGH (ref 22–26)
CO2 SERPL-SCNC: 21 MMOL/L — LOW (ref 22–31)
CO2 SERPL-SCNC: 24 MMOL/L — SIGNIFICANT CHANGE UP (ref 22–31)
COCAINE METAB.OTHER UR-MCNC: NEGATIVE — SIGNIFICANT CHANGE UP
CREAT SERPL-MCNC: 0.73 MG/DL — SIGNIFICANT CHANGE UP (ref 0.5–1.3)
CREAT SERPL-MCNC: 0.74 MG/DL — SIGNIFICANT CHANGE UP (ref 0.5–1.3)
CREATININE URINE RESULT, DAU: 57 MG/DL — SIGNIFICANT CHANGE UP
EGFR: 97 ML/MIN/1.73M2 — SIGNIFICANT CHANGE UP
EGFR: 99 ML/MIN/1.73M2 — SIGNIFICANT CHANGE UP
EOSINOPHIL # BLD AUTO: 0.02 K/UL — SIGNIFICANT CHANGE UP (ref 0–0.5)
EOSINOPHIL NFR BLD AUTO: 0.4 % — SIGNIFICANT CHANGE UP (ref 0–6)
ETHANOL SERPL-MCNC: <10 MG/DL — SIGNIFICANT CHANGE UP
GAS PNL BLDV: 135 MMOL/L — LOW (ref 136–145)
GLUCOSE BLDV-MCNC: 90 MG/DL — SIGNIFICANT CHANGE UP (ref 70–99)
GLUCOSE SERPL-MCNC: 101 MG/DL — HIGH (ref 70–99)
GLUCOSE SERPL-MCNC: 86 MG/DL — SIGNIFICANT CHANGE UP (ref 70–99)
HCG SERPL-ACNC: <1 MIU/ML — SIGNIFICANT CHANGE UP
HCG SERPL-ACNC: <1 MIU/ML — SIGNIFICANT CHANGE UP
HCO3 BLDV-SCNC: 30 MMOL/L — HIGH (ref 22–29)
HCT VFR BLD CALC: 43.6 % — SIGNIFICANT CHANGE UP (ref 34.5–45)
HCT VFR BLDA CALC: 44 % — SIGNIFICANT CHANGE UP (ref 34.5–46.5)
HGB BLD CALC-MCNC: 14.5 G/DL — SIGNIFICANT CHANGE UP (ref 11.7–16.1)
HGB BLD-MCNC: 14.3 G/DL — SIGNIFICANT CHANGE UP (ref 11.5–15.5)
IANC: 3.21 K/UL — SIGNIFICANT CHANGE UP (ref 1.8–7.4)
IMM GRANULOCYTES NFR BLD AUTO: 0.2 % — SIGNIFICANT CHANGE UP (ref 0–0.9)
INR BLD: 0.93 RATIO — SIGNIFICANT CHANGE UP (ref 0.85–1.18)
LACTATE BLDV-MCNC: 1.7 MMOL/L — SIGNIFICANT CHANGE UP (ref 0.5–2)
LYMPHOCYTES # BLD AUTO: 1.93 K/UL — SIGNIFICANT CHANGE UP (ref 1–3.3)
LYMPHOCYTES # BLD AUTO: 34.5 % — SIGNIFICANT CHANGE UP (ref 13–44)
MCHC RBC-ENTMCNC: 27.4 PG — SIGNIFICANT CHANGE UP (ref 27–34)
MCHC RBC-ENTMCNC: 32.8 GM/DL — SIGNIFICANT CHANGE UP (ref 32–36)
MCV RBC AUTO: 83.5 FL — SIGNIFICANT CHANGE UP (ref 80–100)
METHADONE UR-MCNC: NEGATIVE — SIGNIFICANT CHANGE UP
MONOCYTES # BLD AUTO: 0.39 K/UL — SIGNIFICANT CHANGE UP (ref 0–0.9)
MONOCYTES NFR BLD AUTO: 7 % — SIGNIFICANT CHANGE UP (ref 2–14)
NEUTROPHILS # BLD AUTO: 3.21 K/UL — SIGNIFICANT CHANGE UP (ref 1.8–7.4)
NEUTROPHILS NFR BLD AUTO: 57.4 % — SIGNIFICANT CHANGE UP (ref 43–77)
NRBC # BLD: 0 /100 WBCS — SIGNIFICANT CHANGE UP (ref 0–0)
NRBC # FLD: 0 K/UL — SIGNIFICANT CHANGE UP (ref 0–0)
OPIATES UR-MCNC: NEGATIVE — SIGNIFICANT CHANGE UP
OXYCODONE UR-MCNC: NEGATIVE — SIGNIFICANT CHANGE UP
PCO2 BLDV: 54 MMHG — HIGH (ref 39–52)
PCP SPEC-MCNC: SIGNIFICANT CHANGE UP
PCP UR-MCNC: NEGATIVE — SIGNIFICANT CHANGE UP
PH BLDV: 7.35 — SIGNIFICANT CHANGE UP (ref 7.32–7.43)
PLATELET # BLD AUTO: 290 K/UL — SIGNIFICANT CHANGE UP (ref 150–400)
PO2 BLDV: 34 MMHG — SIGNIFICANT CHANGE UP (ref 25–45)
POTASSIUM BLDV-SCNC: 4.4 MMOL/L — SIGNIFICANT CHANGE UP (ref 3.5–5.1)
POTASSIUM SERPL-MCNC: 4.5 MMOL/L — SIGNIFICANT CHANGE UP (ref 3.5–5.3)
POTASSIUM SERPL-MCNC: 4.8 MMOL/L — SIGNIFICANT CHANGE UP (ref 3.5–5.3)
POTASSIUM SERPL-SCNC: 4.5 MMOL/L — SIGNIFICANT CHANGE UP (ref 3.5–5.3)
POTASSIUM SERPL-SCNC: 4.8 MMOL/L — SIGNIFICANT CHANGE UP (ref 3.5–5.3)
PROT SERPL-MCNC: 8.1 G/DL — SIGNIFICANT CHANGE UP (ref 6–8.3)
PROT SERPL-MCNC: 8.8 G/DL — HIGH (ref 6–8.3)
PROTHROM AB SERPL-ACNC: 10.4 SEC — SIGNIFICANT CHANGE UP (ref 9.5–13)
RBC # BLD: 5.22 M/UL — HIGH (ref 3.8–5.2)
RBC # FLD: 13.4 % — SIGNIFICANT CHANGE UP (ref 10.3–14.5)
SALICYLATES SERPL-MCNC: <0.3 MG/DL — LOW (ref 15–30)
SAO2 % BLDV: 53.2 % — LOW (ref 67–88)
SODIUM SERPL-SCNC: 138 MMOL/L — SIGNIFICANT CHANGE UP (ref 135–145)
SODIUM SERPL-SCNC: 140 MMOL/L — SIGNIFICANT CHANGE UP (ref 135–145)
THC UR QL: NEGATIVE — SIGNIFICANT CHANGE UP
TOXICOLOGY SCREEN, DRUGS OF ABUSE, SERUM RESULT: SIGNIFICANT CHANGE UP
TROPONIN T, HIGH SENSITIVITY RESULT: <6 NG/L — SIGNIFICANT CHANGE UP
WBC # BLD: 5.59 K/UL — SIGNIFICANT CHANGE UP (ref 3.8–10.5)
WBC # FLD AUTO: 5.59 K/UL — SIGNIFICANT CHANGE UP (ref 3.8–10.5)

## 2024-01-26 PROCEDURE — 95718 EEG PHYS/QHP 2-12 HR W/VEEG: CPT

## 2024-01-26 PROCEDURE — 70498 CT ANGIOGRAPHY NECK: CPT | Mod: 26,MA

## 2024-01-26 PROCEDURE — 0042T: CPT | Mod: MA

## 2024-01-26 PROCEDURE — 71045 X-RAY EXAM CHEST 1 VIEW: CPT | Mod: 26

## 2024-01-26 PROCEDURE — 99223 1ST HOSP IP/OBS HIGH 75: CPT

## 2024-01-26 PROCEDURE — 99285 EMERGENCY DEPT VISIT HI MDM: CPT

## 2024-01-26 PROCEDURE — 70450 CT HEAD/BRAIN W/O DYE: CPT | Mod: 26,MA,59

## 2024-01-26 PROCEDURE — 70496 CT ANGIOGRAPHY HEAD: CPT | Mod: 26,MA

## 2024-01-26 RX ORDER — LEVETIRACETAM 250 MG/1
2000 TABLET, FILM COATED ORAL ONCE
Refills: 0 | Status: COMPLETED | OUTPATIENT
Start: 2024-01-26 | End: 2024-01-26

## 2024-01-26 RX ORDER — ENOXAPARIN SODIUM 100 MG/ML
40 INJECTION SUBCUTANEOUS EVERY 24 HOURS
Refills: 0 | Status: DISCONTINUED | OUTPATIENT
Start: 2024-01-26 | End: 2024-02-01

## 2024-01-26 RX ORDER — ACETAMINOPHEN 500 MG
650 TABLET ORAL EVERY 6 HOURS
Refills: 0 | Status: DISCONTINUED | OUTPATIENT
Start: 2024-01-26 | End: 2024-02-01

## 2024-01-26 RX ORDER — LANOLIN ALCOHOL/MO/W.PET/CERES
3 CREAM (GRAM) TOPICAL AT BEDTIME
Refills: 0 | Status: DISCONTINUED | OUTPATIENT
Start: 2024-01-26 | End: 2024-02-01

## 2024-01-26 RX ORDER — LABETALOL HCL 100 MG
10 TABLET ORAL ONCE
Refills: 0 | Status: COMPLETED | OUTPATIENT
Start: 2024-01-26 | End: 2024-01-26

## 2024-01-26 RX ORDER — LEVETIRACETAM 250 MG/1
1000 TABLET, FILM COATED ORAL EVERY 12 HOURS
Refills: 0 | Status: DISCONTINUED | OUTPATIENT
Start: 2024-01-26 | End: 2024-01-29

## 2024-01-26 RX ORDER — LEVETIRACETAM 250 MG/1
1000 TABLET, FILM COATED ORAL EVERY 12 HOURS
Refills: 0 | Status: DISCONTINUED | OUTPATIENT
Start: 2024-01-26 | End: 2024-01-26

## 2024-01-26 RX ORDER — LEVETIRACETAM 250 MG/1
1000 TABLET, FILM COATED ORAL ONCE
Refills: 0 | Status: COMPLETED | OUTPATIENT
Start: 2024-01-26 | End: 2024-01-26

## 2024-01-26 RX ORDER — LABETALOL HCL 100 MG
10 TABLET ORAL EVERY 6 HOURS
Refills: 0 | Status: DISCONTINUED | OUTPATIENT
Start: 2024-01-26 | End: 2024-01-28

## 2024-01-26 RX ADMIN — LEVETIRACETAM 600 MILLIGRAM(S): 250 TABLET, FILM COATED ORAL at 15:21

## 2024-01-26 RX ADMIN — LEVETIRACETAM 400 MILLIGRAM(S): 250 TABLET, FILM COATED ORAL at 11:40

## 2024-01-26 RX ADMIN — ENOXAPARIN SODIUM 40 MILLIGRAM(S): 100 INJECTION SUBCUTANEOUS at 18:50

## 2024-01-26 RX ADMIN — Medication 4 MILLIGRAM(S): at 16:22

## 2024-01-26 NOTE — H&P ADULT - NSHPLABSRESULTS_GEN_ALL_CORE
.  LABS:                         14.3   5.59  )-----------( 290      ( 26 Jan 2024 11:50 )             43.6     01-26    138  |  102  |  12  ----------------------------<  86  4.5   |  21<L>  |  0.74    Ca    9.7      26 Jan 2024 11:50    TPro  8.1  /  Alb  4.1  /  TBili  0.3  /  DBili  x   /  AST  18  /  ALT  11  /  AlkPhos  99  01-26    PT/INR - ( 26 Jan 2024 11:50 )   PT: 10.4 sec;   INR: 0.93 ratio         PTT - ( 26 Jan 2024 11:50 )  PTT:34.8 sec  Urinalysis Basic - ( 26 Jan 2024 11:50 )    Color: x / Appearance: x / SG: x / pH: x  Gluc: 86 mg/dL / Ketone: x  / Bili: x / Urobili: x   Blood: x / Protein: x / Nitrite: x   Leuk Esterase: x / RBC: x / WBC x   Sq Epi: x / Non Sq Epi: x / Bacteria: x                RADIOLOGY, EKG & ADDITIONAL TESTS: Reviewed.   < from: CT Brain Perfusion Maps Stroke (01.26.24 @ 11:23) >      CT BRAIN  1. No significant interval change.  2. No evidence of acute hemorrhage, vasogenic edema or territorial   infarct.  3. Additional findings described in detail above.    CT PERFUSION  1. No predicted core infarct.  2. No evidence of active ischemia-tissue at risk.    CTA  1. Vertebral arteries patent.  2. No evidence of significant stenosis, occlusion or dissection bilateral   extracranial carotid arteries.  3. No evidence of large vessel occlusion, definitive aneurysm or vascular   malformation intracranial circulation.  4. Additional findings described in detail above, including prominence of   the nasopharyngeal adenoidal tissue, of uncertain etiology, and multiple   hypodense thyroid nodules. Recommending nonemergent ENT clinical   evaluation and thyroid ultrasound.    If clinical symptoms persist recommend further imaging with MRI, provided   there are no medical contraindications. Results and recommendations of CT   brain discussed with Dr. Frost at 11:23 AM the day of this exam.    < end of copied text >

## 2024-01-26 NOTE — ED PROVIDER NOTE - PROGRESS NOTE DETAILS
Patient noted to be hypotensive blood pressure 218/163  heart rate 79 unclear medications at home will give 1 dose of IV labetalol at this time.  Pending admission, tient afebrile rectally Patient blood pressure 166 90  heart rate 70 heart without labetalol patient was not given IV dose labetalol.  Will admit  Family updated Patient evaluated by neurology attending recommendations appreciated patient to be admitted to medicine Per pharmacy patient to get 2000 mg of Keppra based on her weight.  Patient ready given 1 g prior Daughter called us over to state pt actively seizing. Pt protecting airway at this time resisting eye opening, moaning. Handrails covered to avoid head trauma. Primary team now at bedside.    Attending Ruben Patient evaluated by neurology attending recommendations appreciated patient to be admitted to medicine. pt alert, now answering 1-2 words, comfortable. will admit. to medicine.  Dr Sinclair

## 2024-01-26 NOTE — ED PROVIDER NOTE - OBJECTIVE STATEMENT
53-year-old female with a past medical history of hypertension presenting after witnessed seizure. Patient works at [employer] and had a witnessed seizure in front of coworkers. When EMS arrived patient had a second seizure. Patient unresponsive on presentation.

## 2024-01-26 NOTE — H&P ADULT - HISTORY OF PRESENT ILLNESS
53 y.o female with HTN, presents after witnessed seizure.     Per documentation patient had a seizure at work, witnessed by coworkers, then another seizure when EMS arriven. Patient presented to the ED, and since then has 2 additional episodes. Per family,  and daughter at bedside, patient has never had a seizure before. Patient has been under stress lately because of work. Only medication is for blood pressure: amold-valsartan. Per  patient stated she did not felt well yesterday but went to work without issues. No family history of seizures. No sick contacts, no recent illness, no smoking or drugs.       In the ED, patient was a stroke code, CT head/perfusion was negative for acute hemorrhage, no evidence of significant stenosis, occlusion or dissection bilateral extracranial carotid arteries. Patient received 1g of Keppra, then 2g for loading. Seen by neurology who recommended MRI brain, video EEG.

## 2024-01-26 NOTE — ED ADULT NURSE NOTE - NS ED PATIENT SAFETY CONCERN
Physical Exam  Mallampati: III  TM Distance: >3 FB  Neck ROM: Limited  cardiovascular exam normal  pulmonary exam normal        Anesthesia Plan     ASA Status: 3  Anesthesia Type: MAC  Reviewed: Medications, NPO Status, Problem List, Allergies, Patient Summary, Past Med History, Lab Results and EKG  The proposed anesthetic plan, including its risks and benefits, have been discussed with the Patient - along with the risks and benefits of alternatives.  Questions were encouraged and answered and the patient and/or representative agrees to proceed.  Blood Products: Not Anticipated  Plan Comments: Anaphylaxis to lidocaine. Will AVOID!    History of PONV. Will place scopolamine patch pre-op and plan on several IV antiemetics intra-op.    Surgeon requests MAC, and pt will be more awake during portions of the procedure.  She verbalized understanding and agreed to proceed.          Anesthesia ROS/Med Hx    Overall Review:  Pts. EKG was reviewed, Pts.echo was reviewed and Pts.stress test was reviewed   Pt. has history of anesthetic complications (ponv)    Pulmonary Review:    Pt. negative for sleep apnea   Pt. negative for asthma  The patient is a current smoker.     Neuro/Psych Review:    Pt. negative for seizures  Pt. positive for TIA  Pt. positive for CVA (memory loss) - residual symptoms  Pt. positive for headaches    Cardiovascular Review:  Cardiovascular ROS notes: PFO. DVT.    NSR    Normal left ventricular size, systolic function and wall thickness, with no regional wall motion abnormalities.  Left ventricular ejection fraction, 66 %.  Normal right ventricular size and systolic function.  Mild-to-moderate tricuspid valve regurgitation.  Right ventricular systolic pressure estimated at 20.9 mmHg.  That may be underestimated due to incomplete profile on spectral doppler.  Normal inferior vena cava.      CONCLUSION:     1.  No evidence of Regadenoson induced ischemia.     2.  No evidence of prior myocardial  infarction.     3.  Normal left ventricular size, wall motion and systolic function.  Pt. negative for past MI  Pt. negative for CAD  Pt. negative for CABG/stent  Pt. negative for angina  Pt. positive for hypertension  Pt. positive for hyperlipidemia    GI/HEPATIC/RENAL Review:    Pt. positive for GERD  Pt. negative for liver disease  Pt. negative for renal disease    End/Other Review:    Pt. negative for diabetes  Pt. positive for hypothyroidism  Overall Review of Systems Comments:  Factor 5 Leiden mutation    Normal left ventricular size, systolic function and wall thickness, with no regional wall motion abnormalities.  Left ventricular ejection fraction, 66 %.  Normal right ventricular size and systolic function.  Mild-to-moderate tricuspid valve regurgitation.  Right ventricular systolic pressure estimated at 20.9 mmHg.    Sinus bradycardia   Otherwise normal ECG      No

## 2024-01-26 NOTE — ED PROVIDER NOTE - ATTENDING CONTRIBUTION TO CARE
Attending Statement: I have personally seen and examined this patient. I have fully participated in the care of this patient. I have reviewed all pertinent clinical information, including history physical exam, plan and the Resident's note and agree except as noted 54-year-old female history of hypertension brought in by EMS chief complaint of seizure.  Patient unable to give history at this time, spoke with EMS who was called by coworkers when he noticed having a tonic-clonic seizure lasted about 1 to 2 minutes, she was lowered to the floor no head trauma as per history.  There was no vomit or urine or bowel incontinence.  EMS was called EMS states that she was responding by nodding to yes or no questions but not talking.  While together in the ambulance they noticed another seizure lasted about 30 seconds and was generalized again no vomiting no urine or bowel incontinence.  In triage patient is not answering questions minimally following commands Attending Statement: I have personally seen and examined this patient. I have fully participated in the care of this patient. I have reviewed all pertinent clinical information, including history physical exam, plan and the Resident's note and agree except as noted 54-year-old female history of hypertension brought in by EMS chief complaint of seizure.  Patient unable to give history at this time, spoke with EMS who was called by coworkers when he noticed having a tonic-clonic seizure lasted about 1 to 2 minutes, she was lowered to the floor no head trauma as per history.  There was no vomit or urine or bowel incontinence.  EMS was called EMS states that she was responding by nodding to yes or no questions but not talking.  While together in the ambulance they noticed another seizure lasted about 30 seconds and was generalized again no vomiting no urine or bowel incontinence.  In triage patient is not answering questions minimally following commands, Code stroke called in triage.  Neurology at bedside.  While IV being placed patient had a generalized tonic-clonic seizure lasting a few seconds self resolved.  Witnessed by neurology.  Recommend to proceed with imaging at this time CT head\angio\perfusion.  Patient tolerated CTs back in the room, laying in bed nonverbal.  No facial asymmetry appreciated no laceration to the lips or tongue.  No epistasis.  No vomit on patient not incontinence.  No respiratory distress poor respiratory effort not requiring oxygen.  Soft abdomen does not grimace to palpation.  No respond to tactile or painful stimuli.  No sign of trauma.  Plan seizure precautions, EKG, vital time, labs, chest x-ray, CT head, CT angio head and neck, CT perfusion, neurology recommendations.    1130am neurology states CT head and CT angio unremarkable reviewed with fellow.  Recommend loading with Keppra 40 mg/kg pending weight at this time will give Keppra 1 g.  Recommends medicine admission will follow.  Labs had to be reordered pending results.

## 2024-01-26 NOTE — ED ADULT TRIAGE NOTE - CHIEF COMPLAINT QUOTE
pt was at work today and had witnessed seizure CoDe Stroke CaLlEd as per Dr Sinclair  pt is not following direction at triage time

## 2024-01-26 NOTE — H&P ADULT - TIME-BASED BILLING (NON-CRITICAL CARE)
Monitor: The problem is stable.  Evaluation: No labs/tests required today.  Assessment/Treatment:  Will increase protein intake   Time-based billing (NON-critical care)

## 2024-01-26 NOTE — CHART NOTE - NSCHARTNOTEFT_GEN_A_CORE
Called by neurology team recommending unrestrained mittents to prevent patient pulling on EEG that will be placed on patient. Donnie and RN made aware that once EEG completed may be removed.

## 2024-01-26 NOTE — ED PROVIDER NOTE - PHYSICAL EXAMINATION
General: Awake, unresponsive  Mentation: AAO x 0  HEENT: airway patent, conjunctivae clear bilaterally, right pupil 1-2mm not reactive to light, left pupil 2-3mm reactive to light  Resp: symmetric chest rise, no resp distress, breath sounds CTA bilaterally  Cardio: RRR, no m/r/g  GI: soft/nondistended/nontender  Neuro: not responding to commands, not withdrawing to pain  Skin: no cyanosis, no jaundice  Lymph/Vasc: no LE edema

## 2024-01-26 NOTE — ED ADULT NURSE NOTE - TEMPLATE
[address] [facility] [address] MD Annalise, FACP, Samantha Garcia, PA-NAKUL Prieto, AGPCNP-BC   Yevgeniy Tucker, [facility]   Aminata Branch, FNP-C  Jabier Gutierrez, FNP-C   Alton German, AGPCNP-BC   Akilah Vale, [facility]      [address]   at [facility]   [address] [facility], [address] [facility], [address]   [phone]   FAX: [address]   at [facility], [address] [facility], [address]   [phone]   FAX: [phone]       Patient Care Team:  Kayode Sanchez MD as PCP - General  Kayode Sanchez MD as PCP - Empaneled Provider  Stiven Valle MD (Radiology)  Jairo Ruff RN as Ambulatory Care Manager      Patient Active Problem List   Diagnosis    Bladder wall thickening    De Quervain's tenosynovitis    Scapholunate advanced collapse of left wrist    Hepatitis C virus infection cured after antiviral drug therapy    Gastro-esophageal reflux disease without esophagitis    Weight loss    Essential hypertension    Liver mass    Smoker    Opioid dependence with withdrawal ([address])    Unspecified mood (affective) disorder ([address])    Status epilepticus ([address])    IV drug user    CVA (cerebral vascular accident) [facility])    Wheelchair dependence    Lung nodule    Debility    Dyspnea    FTT (failure to thrive) in adult    Tuberculosis of lung       Jose Ramon Stevens is being seen at [facility] [address] for management of hepatocellular carcinoma and cirrhosis secondary to chronic HCV. The active problem list, all pertinent past medical history, medications, radiologic findings and laboratory findings related to the liver disorder were reviewed and discussed with the patient. The patient is a 72 y.o. male with a long history of ongoing IV dug use and opioid dependence.       He had HCV that was
Neuro

## 2024-01-26 NOTE — H&P ADULT - NSHPPHYSICALEXAM_GEN_ALL_CORE
.  VITAL SIGNS:  T(C): 36.4 (01-26-24 @ 13:05), Max: 36.9 (01-26-24 @ 11:28)  T(F): 97.6 (01-26-24 @ 13:05), Max: 98.4 (01-26-24 @ 11:28)  HR: 91 (01-26-24 @ 13:38) (78 - 94)  BP: 166/92 (01-26-24 @ 13:38) (166/92 - 218/155)  BP(mean): --  RR: 16 (01-26-24 @ 13:38) (16 - 22)  SpO2: 100% (01-26-24 @ 13:38) (99% - 100%)  Wt(kg): --    PHYSICAL EXAM:    Constitutional: resting comfortably in bed; NAD  Head: NC/AT  Eyes: PERRL, EOMI, anicteric sclera  Respiratory: CTA B/L; no W/R/R, no retractions  Cardiac: +S1/S2; RRR; no M/R/G; PMI non-displaced  Gastrointestinal: abdomen soft, NT/ND; no rebound or guarding; +BSx4; no CVAT B/L  Extremities: WWP, no clubbing or cyanosis; no peripheral edema  Musculoskeletal: passive NROM x4; no joint swelling, tenderness or erythema  Vascular: 2+ radial, femoral, DP/PT pulses B/L  Dermatologic: skin warm, dry and intact; no rashes, wounds, or scars  Neurologic: not responding to questions, following some commands

## 2024-01-26 NOTE — ED PROVIDER NOTE - HIV OFFER
Received a call from Flora Cohen requesting to see pt re  [facility] RN/PT. EDCM spoke to pt and Linda/friend re HHRN/PT, and both were agreeable for home health services. Pt and Linda/friend was made aware that whoever accepting agency will be the one to call them tomorrow. Flora Cohen was made aware of the above. Will endorse to incoming [facility] in the morning.
Unable to answer due to medical condition/unresponsive/etc...

## 2024-01-26 NOTE — ED ADULT NURSE NOTE - OBJECTIVE STATEMENT
Stroke Nurse: 53-year-old with past medical history of hypertension, prediabetes, vitamin D deficiency, ?per outpatient records hyperlipidemia ( (LDL 93   Per history from EMS, was working at [employer] when was witnessed by staff to have generalized convulsions lasting roughly 2 minutes, at the time BP reportedly in 240s. Upon arrival of EMS, witnessed to have another episode 30 seconds of convulsions no incontinence noted, .  At CT,  noted to have another episode of convulsions, head turned briefly to left lasting less than 30 seconds in duration by ED + neuro team. Report given to primary RN Lindsay

## 2024-01-26 NOTE — ED ADULT NURSE REASSESSMENT NOTE - NS ED NURSE REASSESS COMMENT FT1
PT had seizure like activity for 10 seconds. respirations are even and un labored. MD bender at bedside. medication given as ordered. safety precautions maintained, seizures precautions maintained.

## 2024-01-26 NOTE — CHART NOTE - NSCHARTNOTEFT_GEN_A_CORE
Neurology  Covering 4PM-7PM    Notified by medicine attg ~4:10PM that patient had another seizure while in ED. Arrived to ED to evaluate patient and  and daughter were present. They provided information. Regarding patient, no known prior seizures, no prior head trauma, no known hx of meningitis, normal birth that they know of. She has history of HTN that may be poorly controlled per them as she is not always consistent with her multiple antihypertensives. No recent illnesses, sick contacts, rashes. She was seen well in the AM then called spouse later in AM that she felt unwell, so this was out of the blue for both family members.     - Vitals reviewed: afebrile, hypertensive to 200s, RR22, satting well.   - Re-examined: eyes closed, not awakening to noxious stimuli, regular chest rise, no nuchal rigidity, neck is loose w/o any stiffness. Cannot assess orientation. PERRL, L gaze preference, cross midline w/ OCR. No BTT, no pepe facial asymmetry. All extremities flaccid. No abnormal movement of any extremity. Not withdrawing to noxious stimuli x4 extremities. Reflexes Reflexes L/R:  Biceps(C5) 2/2  BR(C6) 2/2   Triceps(C7)  2/2 Patellar(L4)   2/2   Ankles 2/2. Mute toes, no ankle clonus.    - Labs reviewed: cbc, cmp, coags, troponin T, lactate 1.7, tox negative  - CT head Neurology  Covering 4PM-7PM    Notified by medicine attg ~4:10PM that patient had another seizure while in ED. Arrived to ED to evaluate patient and  and daughter Marco () were present. They provided information. Regarding patient, no known prior seizures, no known hx of meningitis, normal birth that they know of. She has history of HTN that may be poorly controlled per them as she is not always consistent with her multiple antihypertensives. No recent illnesses, sick contacts, rashes. She was seen well in the AM then called spouse later in AM that she felt unwell, so this was out of the blue for both family members. Daughter did later mention patient had passed out possibly with head strike at a  >1 year ago.     - Vitals reviewed: afebrile, hypertensive to 200s, RR22, satting well.   - Re-examined: eyes closed, not awakening to noxious stimuli, regular chest rise, no nuchal rigidity, neck is loose w/o any stiffness. Cannot assess orientation. PERRL, L gaze preference, cross midline w/ OCR. No BTT, no pepe facial asymmetry. All extremities flaccid. No abnormal movement of any extremity. Not withdrawing to noxious stimuli x4 extremities. Reflexes Reflexes L/R:  Biceps(C5) 2/2  BR(C6) 2/2   Triceps(C7)  2/2 Patellar(L4)   2/2   Ankles 2/2. Mute toes, no ankle clonus.    - Labs reviewed: cbc, cmp, coags, troponin T, lactate 1.7, tox negative  - CT A and CTP of head/neck unrevealing  - EEG for ~1.5 hrs was able to be obtained while patient in ED through much coordination with EEG tech, ED nursing staff. Prelim verbal report by epilepsy fellow indicated no epileptiform pattern noted. Unclear but possible ?focal slowing but to be determined pending official report. Patient was disconnected after 1.5 hrs in order for her to be transported to her room on different floor.    Recommendations:  - continue maintenance keppra 1000mg BID for now  - pending MRI    D/w epilepsy fellow and neuro attending Dr Lopez  D/w primary team ACP   D/w daughter bedside, all questions answered.

## 2024-01-26 NOTE — EEG REPORT - NS EEG TEXT BOX
LIZZIE RAHMAN [id number]     Study Date: 01/26/24 19:27-21:05  Duration: 1 hr 38 min  --------------------------------------------------------------------------------------------------  History:  CC/ HPI Patient is a 53y old  Female who presents with a chief complaint of seizure (26 Jan 2024 16:34)    MEDICATIONS  (STANDING):  enoxaparin Injectable 40 milliGRAM(s) SubCutaneous every 24 hours  levETIRAcetam  IVPB 1000 milliGRAM(s) IV Intermittent every 12 hours    --------------------------------------------------------------------------------------------------  Study Interpretation:    [[[Abbreviation Key:  PDR=alpha rhythm/posterior dominant rhythm. A-P=anterior posterior.  Amplitude: ‘very low’:<20; ‘low’:20-49; ‘medium’:; ‘high’:>150uV.  Persistence for periodic/rhythmic patterns (% of epoch) ‘rare’:<1%; ‘occasional’:1-10%; ‘frequent’:10-50%; ‘abundant’:50-90%; ‘continuous’:>90%.  Persistence for sporadic discharges: ‘rare’:<1/hr; ‘occasional’:1/min-1/hr; ‘frequent’:>1/min; ‘abundant’:>1/10 sec.  RPP=rhythmic and periodic patterns; GRDA=generalized rhythmic delta activity; FIRDA=frontal intermittent GRDA; LRDA=lateralized rhythmic delta activity; TIRDA=temporal intermittent rhythmic delta activity;  LPD=PLED=lateralized periodic discharges; GPD=generalized periodic discharges; BIPDs =bilateral independent periodic discharges; Mf=multifocal; SIRPDs=stimulus induced rhythmic, periodic, or ictal appearing discharges; BIRDs=brief potentially ictal rhythmic discharges >4 Hz, lasting .5-10s; PFA (paroxysmal bursts >13 Hz or =8 Hz <10s).  Modifiers: +F=with fast component; +S=with spike component; +R=with rhythmic component.  S-B=burst suppression pattern.  Max=maximal. N1-drowsy; N2-stage II sleep; N3-slow wave sleep. SSS/BETS=small sharp spikes/benign epileptiform transients of sleep. HV=hyperventilation; PS=photic stimulation]]]    Daily EEG Visual Analysis    FINDINGS:      Background:  The background is continuous and symmetric.  The EEG is recorded in stage 2 sleep with occasional brief arousals. Sustained wakefulness is not captured.  During arousals, the background consists of diffuse alpha and beta activity and diffuse muscle artifact that frequently limits interpretation during these times.    Background Slowing:  Generalized slowing: None seen  Focal slowing: None    State Changes:   Drowsiness is characterized by slowing of the background activity.  Stage 2 sleep is characterized by symmetric K complexes and sleep spindles.     Interictal Findings:  None    Electrographic and Electroclinical seizures:  None    Other Clinical Events:  None    Activation Procedures:   Hyperventilation is not performed.    Photic stimulation is not performed.    Artifacts:  Intermittent myogenic and movement artifacts are present.    EKG:  Single-lead EKG is not connected.    EEG Classification / Summary:  Normal >1.5-hour video-EEG recorded mostly in stage 2 sleep with brief arousals. Sustained wakefulness is not captured.  No focal or epileptiform abnormalities are captured.     Clinical Impression:  A normal EEG neither refutes nor supports a diagnosis of epilepsy.          -------------------------------------------------------------------------------------------------------    Lizzie Post MD  Attending Physician, [facility]    -------------------------------------------------------------------------------------------------------    To reach EEG technologist:  Please use the pager number for the appropriate hospital or contact the .  At [facility] - Pager #: [phone]    To reach EEG-reading physician:  [facility] EEG Reading Room Phone #: [phone]  Epilepsy Answering Service after 5PM and before 8:30AM: Phone #: [phone]

## 2024-01-26 NOTE — PATIENT PROFILE ADULT - FUNCTIONAL ASSESSMENT - BASIC MOBILITY 6.
1-calculated by average/Not able to assess (calculate score using [facility] averaging method)

## 2024-01-26 NOTE — ED ADULT NURSE NOTE - NS TRANSFER PATIENT BELONGINGS
apple watch, earrings andnecklace labeled and placed with belongings/Wrist Watch/Electronic Device (specify)/Jewelry/Money (specify)

## 2024-01-26 NOTE — H&P ADULT - TIME BILLING
Time-based billing (NON-critical care).     90 minutes spent on total encounter; more than 50% of the visit was spent counseling and / or coordinating care by the attending physician.  The necessity of the time spent during the encounter on this date of service was due to:     documentation in [place], reviewing chart and coordinating care with patient/ACP and interdisciplinary staff (such as , social workers, etc) as well as reviewing vitals, laboratory data, radiology, medication list, consultants' recommendations and prior records. Discussion with neurology, patient's family. Interventions were performed as documented above.

## 2024-01-26 NOTE — H&P ADULT - PROBLEM SELECTOR PLAN 2
home medication: amlodipine-valsartan   hypertense in the ED- likely transiently from seizure? vs hypertensive emergency  -labetalol PRN while NPO pending dysphagia screen as pt post ictal

## 2024-01-26 NOTE — H&P ADULT - PROBLEM SELECTOR PLAN 1
Episode at work, followed by recurrent episodes of seizure like activity in the ED  CT head/perfusion as above: No evidence of significant stenosis, occlusion or dissection bilateral extracranial carotid arteries. No evidence of large vessel occlusion, definitive aneurysm or vascular   malformation intracranial circulation.  -Appreciate neurology recs  -video EEG  -s/p keppra load, start 1g BID  -s/p ativan 4mg x1 for recurrent seizure   -MRI brain w/wo contrast  -bedside dysphagia screen

## 2024-01-26 NOTE — ED ADULT NURSE NOTE - NSFALLHARMRISKINTERV_ED_ALL_ED
Assistance OOB with selected safe patient handling equipment if applicable/Assistance with ambulation/Communicate risk of Fall with Harm to all staff, patient, and family/Monitor gait and stability/Monitor for mental status changes and reorient to person, place, and time, as needed/Provide visual cue: red socks, yellow wristband, yellow gown, etc/Reinforce activity limits and safety measures with patient and family/Toileting schedule using arm’s reach rule for commode and bathroom/Use of alarms - bed, stretcher, chair and/or video monitoring/Bed in lowest position, wheels locked, appropriate side rails in place/Call bell, personal items and telephone in reach/Instruct patient to call for assistance before getting out of bed/chair/stretcher/Non-slip footwear applied when patient is off stretcher/[place] to call system/Physically safe environment - no spills, clutter or unnecessary equipment/Purposeful Proactive Rounding/Room/bathroom lighting operational, light cord in reach

## 2024-01-26 NOTE — CONSULT NOTE ADULT - ATTENDING COMMENTS
During my evaluation, patient's daughter was present and I appreciate that.    I interviewed the patient, discussed the case with the Resident and agree with the findings and plan as documented in the Resident's note except below.  Currently patient is back to baseline and non focal exam.   Etiology of new onset of seizure is uncertain and patient will benefit from further work up to rule out treatable etiologies.   One hour EEG did not showed any seizure or epileptiform potentials.   Patient will benefit from reconnect to VEEG to capture the events.   MRI brain with seizure protocol.   Meanwhile continue Keppra 1 gm BID until she connected to EEG.   Continue medical management, neuro- check and fall precaution.  GI and DVT prophylaxis.  Seizure Precautions discussed:  1. No driving for 1 year from date of last seizure as per [place] law  2. No taking a bath alone or showering with standing water > 2 inches  3. No swimming unsupervised  4. No use of automatic or semi-automatic firearms and no using other firearms unsupervised  5. No use of heavy machinery unsupervised  6. No cooking over an open flame on the front burners (back burners OK)  7. For additional recommendations please discuss with neurology or PCP as outpatient    I discussed the diagnosis and treatment plan with the patient and daughter.  All questions and concerns were addressed. The patient and daughter demonstrated good understanding of the treatment plan.  My cumulative time taking care of this patient is 80 minutes  If you have any further questions, please do not hesitate to contact our consult service.  Thank you for allowing us to participate in this patient care.

## 2024-01-26 NOTE — ED PROVIDER NOTE - CHIEF COMPLAINT
The patient is a 53y Female complaining of seizures. normal... well appearing, well nourished, and in no apparent distress.

## 2024-01-26 NOTE — CONSULT NOTE ADULT - SUBJECTIVE AND OBJECTIVE BOX
Neurology - Consult Note    Spectra: [phone] ([facility]), [phone] ([facility])    HPI: 53-year-old with past medicla history of hypertension, ?prediabetes presneting as a code stroke for AMS.    Per history from EMS, was working at [employer] when was witnessed by staff to have generalized convulsions lasting roughly 2 minutes, at the time BP reportedly in 240s. Upon arrival of EMS, witnessed to have another episode 30 seconds of convuslions, no incontinence noted, .    On presentation, /116, noted to have another episode of convulsions, head turned briefly to left lasting less than 30 seconds in duration by ED + neuro team.    NIHSS: 26  MRS: 0  LKW: unclear, per EMS 10:05 am though unclera if onset of symptoms was at this time      Review of Systems: ADAN    Allergies:  penicillins (Anaphylaxis)  shellfish (Rash)      PMHx/PSHx/Family Hx: As above, otherwise see below   Hypertension    Anxiety    Depression        Social Hx:  Never smoker; no current use of tobacco, alcohol, or illicit drugs  Lives with ***  Occupation ***  Baseline functional status is ***    Medications:  MEDICATIONS  (STANDING):    MEDICATIONS  (PRN):      Vitals:  T(C): --  HR: 78 (01-26-24 @ 10:48) (78 - 78)  BP: 189/116 (01-26-24 @ 10:48) (189/116 - 189/116)  RR: 22 (01-26-24 @ 10:48) (22 - 22)  SpO2: 100% (01-26-24 @ 10:48) (100% - 100%)    Physical Examination: INCOMPLETE  General - non-toxic appearing male/female in no acute distress  Cardiovascular - peripheral pulses palpable, no edema  Respiratory - breathing comfortably with no increased work of breathing    Neurologic Exam:  Mental status - Awake, Alert, Oriented to person, place, and time. Speech fluent, repetition and naming intact. Follows simple and complex commands. Attention/concentration, recent and remote memory (including registration 3/3 and recall 3/3), and fund of knowledge intact    Cranial nerves - PERRLA (4mm -> 3mm b/l), VFF, EOMI, face sensation (V1-V3) intact b/l, facial strength intact without asymmetry b/l, hearing intact b/l, palate with symmetric elevation, trapezius OR sternocleidomastiod 5/5 strength b/l, tongue midline on protrusion with full lateral movement    Motor - Normal bulk and tone throughout. No pronator drift.  Strength testing            Deltoid      Biceps      Triceps     Wrist Extension    Wrist Flexion     Interossei         R            5                 5               5                     5                              5                        5                 5  L             5                 5               5                     5                              5                        5                 5              Hip Flexion    Hip Extension    Knee Flexion    Knee Extension    Dorsiflexion    Plantar Flexion  R              5                         5                       5                           5                            5                          5  L              5                         5                        5                           5                            5                          5    Sensation - Light touch/temperature OR pain/vibration intact throughout  DTR's -             Biceps      Triceps     Brachioradialis      Patellar    Ankle    Toes/plantar response  R             2+             2+                  2+                       2+            2+                 Down  L              2+             2+                 2+                        2+           2+                 Down    Coordination - Finger to Nose intact b/l. No tremors appreciated    Gait and station - Normal casual gait. Romberg (-)    Labs:          CAPILLARY BLOOD GLUCOSE      POCT Blood Glucose.: 117 mg/dL (26 Jan 2024 10:54)          CSF:                  Radiology:     Neurology - Consult Note    Spectra: [phone] ([facility]), [phone] ([facility])    HPI: 53-year-old with past medical history of hypertension,prediabetes, vitamin D deficiency, hyperlipidemia ( (LDL 93 11/2022) presenting as a code stroke for AMS + aphasia s/p seizures.    Per history from EMS, was working at [employer] when was witnessed by staff to have generalized convulsions lasting roughly 2 minutes, at the time BP reportedly in 240s. Upon arrival of EMS, witnessed to have another episode 30 seconds of convulsions no incontinence noted, .    On presentation, /116, noted to have another episode of convulsions, head turned briefly to left lasting less than 30 seconds in duration by ED + neuro team.    NIHSS: 26  MRS: 0  LKW: unclear, per EMS 10:05 am though unclear if onset of symptoms was at this time    Upon re-evaluation at bedside after imaging patient able to follow simple commands raise UE antigravity symmetrically antigravity for several seconds.    Review of Systems: ADAN    Allergies:  penicillins (Anaphylaxis)  shellfish (Rash)      PMHx/PSHx/Family Hx: As above, otherwise see below   Hypertension    Anxiety    Depression        Social Hx:  ADAN    Medications:  MEDICATIONS  (STANDING):    MEDICATIONS  (PRN):      Vitals:  T(C): --  HR: 78 (01-26-24 @ 10:48) (78 - 78)  BP: 189/116 (01-26-24 @ 10:48) (189/116 - 189/116)  RR: 22 (01-26-24 @ 10:48) (22 - 22)  SpO2: 100% (01-26-24 @ 10:48) (100% - 100%)    Physical Examination:  General - female, lying in stretcher    Neurologic Exam:  Mental status - Awake, Alert, ADAN orientation, speech, follows simple commands.    Cranial nerves - ___________PERRLA (4mm -> 3mm b/l), EOMI, no BTT b/l, no overt facial asymmetry appreciated.  Motor - Normal bulk and flaccid tone throughout -> upon re-evaluation normal tone.  initially no w/d to nox stimuli b/l UE/LE, upon re-eavluation able to maintain antigraivty several seconds but drops to bed before 10 seconds in b/l UE. Unable to lift legs b/l antigravity but wiggles toes on command.  Sensation - as above  DTR's - deferred  Coordination - adan  Gait and station - adan    Labs:          CAPILLARY BLOOD GLUCOSE      POCT Blood Glucose.: 117 mg/dL (26 Jan 2024 10:54)          CSF:                  Radiology:     Neurology - Consult Note    Spectra: [phone] ([facility]), [phone] ([facility])    HPI: 53-year-old with past medical history of hypertension, prediabetes, vitamin D deficiency, ?per outpatient records hyperlipidemia ( (LDL 93 11/2022) presenting as a code stroke for AMS + aphasia s/p seizures.    Per history from EMS, was working at [employer] when was witnessed by staff to have generalized convulsions lasting roughly 2 minutes, at the time BP reportedly in 240s. Upon arrival of EMS, witnessed to have another episode 30 seconds of convulsions no incontinence noted, .    On presentation, /116, noted to have another episode of convulsions, head turned briefly to left lasting less than 30 seconds in duration by ED + neuro team.    NIHSS: 26 -> 15 upon re-evaluation following imaging  MRS: 0  LKW: per spouse at bedside, 7:30 am was last seen well. Per EMS 10:05 am 1/26/24    Upon re-evaluation at bedside after imaging patient able to follow simple commands raise UE antigravity symmetrically antigravity for several seconds.    No recent infections/illnesses.    Review of Systems: ADAN    Allergies:  penicillins (Anaphylaxis)  shellfish (Rash)      PMHx/PSHx/Family Hx: As above, otherwise see below   Hypertension  Anxiety  Depression    Social Hx:  ADAN    Vitals:  T(C): --  HR: 78 (01-26-24 @ 10:48) (78 - 78)  BP: 189/116 (01-26-24 @ 10:48) (189/116 - 189/116)  RR: 22 (01-26-24 @ 10:48) (22 - 22)  SpO2: 100% (01-26-24 @ 10:48) (100% - 100%)    Physical Examination:  General - female, lying in stretcher    Neurologic Exam:  Mental status - Awake, Alert, ADAN orientation, speech, follows simple commands.  Cranial nerves - Pupils reactive to light, L pupil 4->2.5, R pupil 3-> 2.5, EOMI, no BTT b/l-> nods correctly when examiner moving fingers in R or L lateral fields, no overt facial asymmetry appreciated.  Motor - Normal bulk and flaccid tone throughout -> upon re-evaluation normal tone.  initially no w/d to nox stimuli b/l UE/LE, upon re-evaluation able to maintain antigravity several seconds but drops to bed before 10 seconds in b/l UE. Unable to lift legs b/l antigravity but wiggles toes on command.  Sensation - as above  DTR's - deferred  Coordination - adan  Gait and station - adan    Labs:  CAPILLARY BLOOD GLUCOSE  POCT Blood Glucose.: 117 mg/dL (26 Jan 2024 10:54)    Radiology:  CT BRAIN  1. No significant interval change.  2. No evidence of acute hemorrhage, vasogenic edema or territorial infarct.  3. Additional findings described in detail above.    CT PERFUSION  1. No predicted core infarct.  2. No evidence of active ischemia-tissue at risk.    CTA  1. Vertebral arteries patent.  2. No evidence of significant stenosis, occlusion or dissection bilateral extracranial carotid arteries.  3. No evidence of large vessel occlusion, definitive aneurysm or vascular malformation intracranial circulation.  4. Additional findings described in detail above, including prominence of the nasopharyngeal adenoidal tissue, of uncertain etiology, and multiple hypodense thyroid nodules. Recommending nonemergent ENT clinical evaluation and thyroid ultrasound. Neurology - Consult Note    Spectra: [phone] ([facility]), [phone] ([facility])    HPI: 53-year-old with past medical history of hypertension, prediabetes, vitamin D deficiency, ?per outpatient records hyperlipidemia ( (LDL 93 11/2022) presenting as a code stroke for AMS + aphasia s/p seizures.  Per history from EMS, was working at [employer] when was witnessed by staff to have generalized convulsions lasting roughly 2 minutes, at the time BP reportedly in 240s. Upon arrival of EMS, witnessed to have another episode 30 seconds of convulsions no incontinence noted, . On presentation, /116, noted to have another episode of convulsions, head turned briefly to left lasting less than 30 seconds in duration by ED + neuro team.    NIHSS: 26 -> 15 upon re-evaluation following imaging  MRS: 0  LKW: per spouse at bedside, 7:30 am was last seen well. Per EMS 10:05 am 1/26/24    Upon re-evaluation at bedside after imaging patient able to follow simple commands raise UE antigravity symmetrically antigravity for several seconds.  No recent infections/illnesses, recent injuries, recent travel, recent reported changes in medications per spouse.    Review of Systems: ADAN    Allergies:  penicillins (Anaphylaxis)  shellfish (Rash)      PMHx/PSHx/Family Hx: As above, otherwise see below   Hypertension  Anxiety  Depression    Social Hx:  ADAN    Vitals:  T(C): --  HR: 78 (01-26-24 @ 10:48) (78 - 78)  BP: 189/116 (01-26-24 @ 10:48) (189/116 - 189/116)  RR: 22 (01-26-24 @ 10:48) (22 - 22)  SpO2: 100% (01-26-24 @ 10:48) (100% - 100%)    Physical Examination:  General - female, lying in stretcher    Neurologic Exam:  Mental status - Awake, Alert, ADAN orientation, speech, follows simple commands.  Cranial nerves - Pupils reactive to light, L pupil 4->2.5, R pupil 3-> 2.5, EOMI, no BTT b/l-> nods correctly when examiner moving fingers in R or L lateral fields, no overt facial asymmetry appreciated.  Motor - Normal bulk and flaccid tone throughout -> upon re-evaluation normal tone.  initially no w/d to nox stimuli b/l UE/LE, upon re-evaluation able to maintain antigravity several seconds but drops to bed before 10 seconds in b/l UE. Unable to lift legs b/l antigravity but wiggles toes on command.  Sensation - as above  DTR's - deferred  Coordination - adan  Gait and station - adan    Labs:  CAPILLARY BLOOD GLUCOSE  POCT Blood Glucose.: 117 mg/dL (26 Jan 2024 10:54)    Radiology:  CT BRAIN  1. No significant interval change.  2. No evidence of acute hemorrhage, vasogenic edema or territorial infarct.  3. Additional findings described in detail above.    CT PERFUSION  1. No predicted core infarct.  2. No evidence of active ischemia-tissue at risk.    CTA  1. Vertebral arteries patent.  2. No evidence of significant stenosis, occlusion or dissection bilateral extracranial carotid arteries.  3. No evidence of large vessel occlusion, definitive aneurysm or vascular malformation intracranial circulation.  4. Additional findings described in detail above, including prominence of the nasopharyngeal adenoidal tissue, of uncertain etiology, and multiple hypodense thyroid nodules. Recommending nonemergent ENT clinical evaluation and thyroid ultrasound. Neurology - Consult Note    Spectra: [phone] ([facility]), [phone] ([facility])    HPI: 53-year-old with past medical history of hypertension, prediabetes, vitamin D deficiency, ?per outpatient records hyperlipidemia ( (LDL 93 11/2022) presenting as a code stroke for AMS + aphasia s/p seizures.  Per history from EMS, was working at [employer] when was witnessed by staff to have generalized convulsions lasting roughly 2 minutes, at the time BP reportedly in 240s. Upon arrival of EMS, witnessed to have another episode 30 seconds of convulsions no incontinence noted, . On presentation, /116, noted to have another episode of convulsions, head turned briefly to left lasting less than 30 seconds in duration by ED + neuro team.    NIHSS: 26 -> 15 upon re-evaluation following imaging  MRS: 0  LKW: per spouse at bedside, 7:30 am was last seen well. Per EMS 10:05 am 1/26/24    Upon re-evaluation at bedside after imaging patient able to follow simple commands raise UE antigravity symmetrically antigravity for several seconds.  No recent infections/illnesses, recent injuries, recent travel, recent reported changes in medications per spouse.    Review of Systems: ADAN    Allergies:  penicillins (Anaphylaxis)  shellfish (Rash)      PMHx/PSHx/Family Hx: As above, otherwise see below   Hypertension  Anxiety  Depression    Social Hx:  ADAN    Vitals:  T(C): --  HR: 78 (01-26-24 @ 10:48) (78 - 78)  BP: 189/116 (01-26-24 @ 10:48) (189/116 - 189/116)  RR: 22 (01-26-24 @ 10:48) (22 - 22)  SpO2: 100% (01-26-24 @ 10:48) (100% - 100%)    Physical Examination:  General - female, lying in stretcher    Neurologic Exam:  Mental status - Awake, Alert, ADAN orientation, speech, follows simple commands.  Cranial nerves - Pupils reactive to light, L pupil 4->2.5, R pupil 3-> 2.5, EOMI, no BTT b/l-> nods correctly when examiner moving fingers in R or L lateral fields, no overt facial asymmetry appreciated.  Motor - Normal bulk and flaccid tone throughout -> upon re-evaluation normal tone.   initially no w/d to nox stimuli b/l UE/LE, upon re-evaluation able to maintain antigravity several seconds but drops to bed before 10 seconds in b/l UE. Unable to lift legs b/l antigravity but wiggles toes on command.  Sensation - as above  DTR's - deferred  Coordination - adan  Gait and station - adan    Labs:  CAPILLARY BLOOD GLUCOSE  POCT Blood Glucose.: 117 mg/dL (26 Jan 2024 10:54)    Radiology:  CT BRAIN  1. No significant interval change.  2. No evidence of acute hemorrhage, vasogenic edema or territorial infarct.  3. Additional findings described in detail above.    CT PERFUSION  1. No predicted core infarct.  2. No evidence of active ischemia-tissue at risk.    CTA  1. Vertebral arteries patent.  2. No evidence of significant stenosis, occlusion or dissection bilateral extracranial carotid arteries.  3. No evidence of large vessel occlusion, definitive aneurysm or vascular malformation intracranial circulation.  4. Additional findings described in detail above, including prominence of the nasopharyngeal adenoidal tissue, of uncertain etiology, and multiple hypodense thyroid nodules. Recommending nonemergent ENT clinical evaluation and thyroid ultrasound. Neurology - Consult Note    Spectra: [phone] ([facility]), [phone] ([facility])    HPI: 53-year-old with past medical history of hypertension, prediabetes, vitamin D deficiency, ?per outpatient records hyperlipidemia ( (LDL 93 11/2022) presenting as a code stroke for AMS + aphasia s/p seizures.  Per history from EMS, was working at [employer] when was witnessed by staff to have generalized convulsions lasting roughly 2 minutes, at the time BP reportedly in 240s. Upon arrival of EMS, witnessed to have another episode 30 seconds of convulsions no incontinence noted, . On presentation, /116, noted to have another episode of convulsions, head turned briefly to left lasting less than 30 seconds in duration by ED + neuro team.    NIHSS: 26 -> 15 upon re-evaluation following imaging  MRS: 0  LKW: per spouse at bedside, 7:30 am was last seen well. Per EMS 10:05 am 1/26/24    Upon re-evaluation at bedside after imaging patient able to follow simple commands raise UE antigravity symmetrically antigravity for several seconds.  No recent infections/illnesses, recent injuries, recent travel, recent reported changes in medications per spouse.    Review of Systems: ADNA    Allergies: penicillins (Anaphylaxis): shellfish (Rash)    PMHx/PSHx/Family Hx: As above, otherwise see below   Hypertension, Anxiety, Depression    Social Hx: ADAN    Vitals:  HR: 78 (01-26-24 @ 10:48) (78 - 78)  BP: 189/116 (01-26-24 @ 10:48) (189/116 - 189/116)  RR: 22 (01-26-24 @ 10:48) (22 - 22)  SpO2: 100% (01-26-24 @ 10:48) (100% - 100%)    Physical Examination:  General - female, lying in stretcher    Neurologic Exam:  Mental status - Awake, Alert, ADAN orientation, speech, follows simple commands.  Cranial nerves - Pupils reactive to light, L pupil 4->2.5, R pupil 3-> 2.5, EOMI, no BTT b/l-> nods correctly when examiner moving fingers in R or L lateral fields, no overt facial asymmetry appreciated.  Motor - Normal bulk and flaccid tone throughout -> upon re-evaluation normal tone.   initially no w/d to nox stimuli b/l UE/LE, upon re-evaluation able to maintain antigravity several seconds but drops to bed before 10 seconds in b/l UE. Unable to lift legs b/l antigravity but wiggles toes on command.  Sensation - as above  DTR's - deferred  Coordination - adan  Gait and station - adan    Labs:  CAPILLARY BLOOD GLUCOSE  POCT Blood Glucose.: 117 mg/dL (26 Jan 2024 10:54)    Radiology:  CT BRAIN  1. No significant interval change.  2. No evidence of acute hemorrhage, vasogenic edema or territorial infarct.  3. Additional findings described in detail above.    CT PERFUSION  1. No predicted core infarct.  2. No evidence of active ischemia-tissue at risk.    CTA  1. Vertebral arteries patent.  2. No evidence of significant stenosis, occlusion or dissection bilateral extracranial carotid arteries.  3. No evidence of large vessel occlusion, definitive aneurysm or vascular malformation intracranial circulation.  4. Additional findings described in detail above, including prominence of the nasopharyngeal adenoidal tissue, of uncertain etiology, and multiple hypodense thyroid nodules. Recommending nonemergent ENT clinical evaluation and thyroid ultrasound.

## 2024-01-26 NOTE — CONSULT NOTE ADULT - ASSESSMENT
ASSESSMENT   53-year-old with past medical history of hypertension,prediabetes, vitamin D deficiency, hyperlipidemia ( (LDL 93 11/2022) presenting as a code stroke for AMS + aphasia s/p seizure-like episodes.  Per history from EMS, was working at [employer] when was witnessed by staff to have generalized convulsions lasting roughly 2 minutes, at the time BP reportedly in 240s. Upon arrival of EMS, witnessed to have another episode 30 seconds of convulsions no incontinence noted, . On presentation, /116, noted to have another episode of convulsions, head turned briefly to left lasting less than 30 seconds in duration by ED + neuro team.  Not candidate for tenecteplase as low suspicion for acute ischemic etiology. Not candidate for thrombectomy as no LVO.  NIHSS: 26  MRS: 0  LKW: unclear, per EMS 10:05 am though unclear if onset of symptoms was at this time    IMPRESSION   3 episodes of convulsions c/f seizures, also noted to have elevated BPs to 200s outside, in ED has been 180s-190s. Differential includes metabolic/infectious/toxic sources, vs PRES given elevated BPs.    RECOMMENDATION   [] vEEG   [] MRI brain w/w/o contrast epilepsy protocol  [] seizure medications:  -if renal function intact would load with keppra 40 mg/kg, followed 6-8 hours later by 1000 mg BID  -alternatively, if renal function impaired, as long as EKG NH interval is normal, can consider loading with vimpat 200 mg, followed 12 hours later by 75 mg BID  [] CBC, CMP, Mg, P, CK, UA, Utox, ammonia  [] neurochecks q4hr  [] DVT PPx with Lovenox 40mg subq daily  [] if patient has a convulsion, please document accurately the length of the episode, and specifically what the patient was doing paying attention to eye opening vs closure, gaze deviation, shaking of extremities, tongue bite, urinary incontinence, any derangement of vital signs.  Generalized motor seizures can have dilated and unreactive pupils. absent oculocephalic reflexes (no dolls eyes), and open eyelids (99% of cases). Head turning from sided to side, pelvic thrusting, bicycling movements, hand waving are NOT manifestations of generalized motor seizures.     Decision to hold tenecteplase and thrombectomy discussed with stroke fellow Dr. Encarnacion under superivison of stroke attending.  Patient to be seen by team and attending. Note finalized upon attending attestation.  ASSESSMENT   53-year-old L-handed female with past medical history of hypertension, prediabetes, vitamin D deficiency, ?per outpatient records hyperlipidemia ( (LDL 93 11/2022) presenting as a code stroke for AMS + aphasia s/p seizure-like episodes.   Not candidate for tenecteplase as low suspicion for acute ischemic etiology. Not candidate for thrombectomy as no LVO.  NIHSS: 26  MRS: 0  LKW: unclear, per EMS 10:05 am though unclear if onset of symptoms was at this time    IMPRESSION   3 episodes of convulsions c/f seizures, also noted to have elevated BPs to 200s outside, in ED has been 180s-190s. Differential includes metabolic/infectious/toxic sources, vs PRES given elevated BPs. Lower suspicion for acute ischemic etiology.    RECOMMENDATION   [] vEEG   [] MRI brain w/w/o contrast epilepsy protocol  [] seizure medications:  -if renal function intact would load with keppra 40 mg/kg, followed 6-8 hours later by 1000 mg BID  -alternatively, if renal function impaired, as long as EKG IA interval is normal, can consider loading with vimpat 200 mg, followed 12 hours later by 75 mg BID  [] CBC, CMP, Mg, P, CK, UA, Utox, ammonia  [] neurochecks q4hr  [] DVT PPx with Lovenox 40mg subq daily  [] if patient has a convulsion, please document accurately the length of the episode, and specifically what the patient was doing paying attention to eye opening vs closure, gaze deviation, shaking of extremities, tongue bite, urinary incontinence, any derangement of vital signs.  Generalized motor seizures can have dilated and unreactive pupils. absent oculocephalic reflexes (no dolls eyes), and open eyelids (99% of cases). Head turning from sided to side, pelvic thrusting, bicycling movements, hand waving are NOT manifestations of generalized motor seizures.     Decision to hold tenecteplase and thrombectomy discussed with stroke fellow Dr. Encarnacion under supervision of stroke attending.  Patient to be seen by team and attending. Note finalized upon attending attestation.  ASSESSMENT   53-year-old L-handed female with past medical history of hypertension, prediabetes, vitamin D deficiency, ?per outpatient records hyperlipidemia ( (LDL 93 11/2022) presenting as a code stroke for AMS + aphasia s/p seizure-like episodes.   Not candidate for tenecteplase as low suspicion for acute ischemic etiology. Not candidate for thrombectomy as no LVO.  NIHSS: 26  MRS: 0  LKW: unclear, per EMS 10:05 am though unclear if onset of symptoms was at this time    IMPRESSION   3 episodes of convulsions c/f seizures, also noted to have elevated BPs to 200s outside, in ED has been 180s-190s. Differential includes metabolic/infectious/toxic sources, vs PRES given elevated BPs. Lower suspicion for acute ischemic etiology.    RECOMMENDATION   [] vEEG -> no EEG machines available at present 1/26/24, no EEG techs reportedly available over the weekend at [facility], defer to ED team but would consider transfer to [facility] for further workup  [] MRI brain w/w/o contrast epilepsy protocol  [] seizure medications:  -if renal function intact would load with keppra 40 mg/kg, followed 6-8 hours later by 1000 mg BID  -alternatively, if renal function impaired, as long as EKG HI interval is normal, can consider loading with vimpat 200 mg, followed 12 hours later by 75 mg BID  [] CBC, CMP, Mg, P, CK, UA, Utox, ammonia  [] neurochecks q4hr  [] DVT PPx with Lovenox 40mg subq daily  [] if patient has a convulsion, please document accurately the length of the episode, and specifically what the patient was doing paying attention to eye opening vs closure, gaze deviation, shaking of extremities, tongue bite, urinary incontinence, any derangement of vital signs.  Generalized motor seizures can have dilated and unreactive pupils. absent oculocephalic reflexes (no dolls eyes), and open eyelids (99% of cases). Head turning from sided to side, pelvic thrusting, bicycling movements, hand waving are NOT manifestations of generalized motor seizures.     Decision to hold tenecteplase and thrombectomy discussed with stroke fellow Dr. Encarnacion under supervision of stroke attending.  Patient to be seen by team and attending. Note finalized upon attending attestation.  ASSESSMENT   53-year-old L-handed female with past medical history of hypertension, prediabetes, vitamin D deficiency, ?per outpatient records hyperlipidemia ( (LDL 93 11/2022) presenting as a code stroke for AMS + aphasia s/p seizure-like episodes.   Not candidate for tenecteplase as low suspicion for acute ischemic etiology. Not candidate for thrombectomy as no LVO.  NIHSS: 26  MRS: 0  LKW: unclear, per EMS 10:05 am though unclear if onset of symptoms was at this time    IMPRESSION   3 episodes of convulsions c/f seizures, also noted to have elevated BPs to 200s outside, in ED has been 180s-190s. Differential includes metabolic/infectious/toxic sources, vs PRES given elevated BPs vs non-epileptiform. Lower suspicion for acute ischemic etiology.    RECOMMENDATION   [] vEEG -> no EEG machines available at present 1/26/24, no EEG techs reportedly available over the weekend at [facility], defer to ED team but would consider transfer to [facility] for further workup  [] MRI brain w/w/o contrast epilepsy protocol  [] seizure medications:  -if renal function intact would load with keppra 40 mg/kg, followed 6-8 hours later by 1000 mg BID  -alternatively, if renal function impaired, as long as EKG NV interval is normal, can consider loading with vimpat 200 mg, followed 12 hours later by 75 mg BID  [] CBC, CMP, Mg, P, CK, UA, Utox, ammonia  [] neurochecks q4hr  [] DVT PPx with Lovenox 40mg subq daily  [] if patient has a convulsion, please document accurately the length of the episode, and specifically what the patient was doing paying attention to eye opening vs closure, gaze deviation, shaking of extremities, tongue bite, urinary incontinence, any derangement of vital signs.  Generalized motor seizures can have dilated and unreactive pupils. absent oculocephalic reflexes (no dolls eyes), and open eyelids (99% of cases). Head turning from sided to side, pelvic thrusting, bicycling movements, hand waving are NOT manifestations of generalized motor seizures.     Decision to hold tenecteplase and thrombectomy discussed with stroke fellow Dr. Encarnacion under supervision of stroke attending.  Patient to be seen by team and attending. Note finalized upon attending attestation.  ASSESSMENT   53-year-old L-handed female with past medical history of hypertension, prediabetes, vitamin D deficiency, ?per outpatient records hyperlipidemia ( (LDL 93 11/2022) presenting as a code stroke for AMS + aphasia s/p seizure-like episodes.   Not candidate for tenecteplase as low suspicion for acute ischemic etiology. Not candidate for thrombectomy as no LVO.  NIHSS: 26  MRS: 0  LKW: unclear, per EMS 10:05 am though unclear if onset of symptoms was at this time    IMPRESSION   3 episodes of convulsions c/f seizures, also noted to have elevated BPs to 200s outside, in ED has been 180s-190s. Differential includes metabolic/infectious/toxic sources, vs PRES given elevated BPs vs non-epileptiform. Lower suspicion for acute ischemic etiology.    RECOMMENDATION   [] vEEG -> no EEG machines available at present 1/26/24, no EEG techs reportedly available over the weekend at [facility], defer to ED team but would consider transfer to [facility] for further workup  [] MRI brain w/w/o contrast epilepsy protocol  [] seizure medications:  -if renal function intact would load with keppra 40 mg/kg, followed 6-8 hours later by 1000 mg BID  -alternatively, if renal function impaired, as long as EKG TN interval is normal, can consider loading with vimpat 200 mg, followed 12 hours later by 75 mg BID  [] CBC, CMP, Mg, P, CK, UA, Utox, ammonia  [] neurochecks q4hr  [] DVT PPx with Lovenox 40mg subq daily  [] if patient has a convulsion, please document accurately the length of the episode, and specifically what the patient was doing paying attention to eye opening vs closure, gaze deviation, shaking of extremities, tongue bite, urinary incontinence, any derangement of vital signs.  Generalized motor seizures can have dilated and unreactive pupils. absent oculocephalic reflexes (no dolls eyes), and open eyelids (99% of cases). Head turning from sided to side, pelvic thrusting, bicycling movements, hand waving are NOT manifestations of generalized motor seizures.     Decision to hold tenecteplase and thrombectomy discussed with stroke fellow Dr. Encarnacion under supervision of stroke attending.  Patient to be seen by team and attending. Note finalized upon attending attestation.     ------------  Pt was staffed with me. Agree with above recommendations: reviewed and edited as needed.    Ramon Encarnacion MD PhD  Vascular neurology fellow PGY5

## 2024-01-26 NOTE — PATIENT PROFILE ADULT - FALL HARM RISK - HARM RISK INTERVENTIONS

## 2024-01-26 NOTE — ED PROVIDER NOTE - CLINICAL SUMMARY MEDICAL DECISION MAKING FREE TEXT BOX
53-year-old female with a past medical history of hypertension presenting after 2 witnessed seizures, third witnessed seizure in the emergency department. Patient awake however unresponsive and not withdrawing to pain. Code stroke called.

## 2024-01-27 PROCEDURE — 99223 1ST HOSP IP/OBS HIGH 75: CPT | Mod: GC

## 2024-01-27 PROCEDURE — 99233 SBSQ HOSP IP/OBS HIGH 50: CPT

## 2024-01-27 RX ORDER — ACETAMINOPHEN 500 MG
1000 TABLET ORAL ONCE
Refills: 0 | Status: COMPLETED | OUTPATIENT
Start: 2024-01-27 | End: 2024-01-27

## 2024-01-27 RX ORDER — VALSARTAN 80 MG/1
160 TABLET ORAL DAILY
Refills: 0 | Status: DISCONTINUED | OUTPATIENT
Start: 2024-01-27 | End: 2024-02-01

## 2024-01-27 RX ADMIN — ENOXAPARIN SODIUM 40 MILLIGRAM(S): 100 INJECTION SUBCUTANEOUS at 18:04

## 2024-01-27 RX ADMIN — Medication 1000 MILLIGRAM(S): at 21:30

## 2024-01-27 RX ADMIN — LEVETIRACETAM 400 MILLIGRAM(S): 250 TABLET, FILM COATED ORAL at 00:10

## 2024-01-27 RX ADMIN — Medication 1 MILLIGRAM(S): at 20:44

## 2024-01-27 RX ADMIN — Medication 400 MILLIGRAM(S): at 21:12

## 2024-01-27 RX ADMIN — LEVETIRACETAM 400 MILLIGRAM(S): 250 TABLET, FILM COATED ORAL at 23:50

## 2024-01-27 RX ADMIN — LEVETIRACETAM 400 MILLIGRAM(S): 250 TABLET, FILM COATED ORAL at 12:17

## 2024-01-27 NOTE — CHART NOTE - NSCHARTNOTEFT_GEN_A_CORE
1/27 Night Coverage Med ACP    Notified by RN pt having seizure like activity.  As per RN, pt's eyes were rolling and family reporting pt's body was stiff and the extremities were shaking vigorously for approx 3 mins as per RN. Ativan 1 mg ivp ordered. Pt seen and assessed at bedside family also at bedside, vss, no incont/tongue biting noted.  Pt post ictal, following simple commands.  As per family, pt was c/o h.a prior to the seizure. Tylenol IVPB ordered. Neuro sepctra [id number] aware.      Shantel dietrich FNP-C 1/27 Night Coverage Med ACP    Notified by RN pt having seizure like activity.  As per RN, pt's eyes were rolling and family reporting pt's body was stiff and the extremities were shaking vigorously for approx 3 mins as per RN. Ativan 1 mg ivp ordered. Pt seen and assessed at bedside family also at bedside, vss, no incont/tongue biting noted.  Pt post ictal, following simple commands.  As per family, pt was c/o h.a prior to the seizure. Right pupil 2 mm non reactive to light and left pupil 1 mm also non reactive to light. Tylenol IVPB ordered. Neuro sepctra [id number] aware.    ------Addendum------------  -Unable to obtain EEG machine until monday, possible transfer to [facility] as per neuro      Shantel dietrich FNP-C

## 2024-01-27 NOTE — PROGRESS NOTE ADULT - PROBLEM SELECTOR PLAN 2
- Home medication: amlodipine-valsartan   - Hypertense in the ED- likely transiently from seizure? vs hypertensive emergency  - Restart valsartan 160, c/w labetalol PRN

## 2024-01-27 NOTE — PROGRESS NOTE ADULT - SUBJECTIVE AND OBJECTIVE BOX
Contact Information:  Nik Rush II, MD, MPH  Internal Medicine    LIZZIE RAHMAN, MRN-[id number]    Patient is a 53y old  Female who presents with a chief complaint of seizure (26 Jan 2024 16:34)      OVERNIGHT EVENTS/INTERVAL/SUBJECTIVE: Patient evaluated at bedside, states that she is experiencing a mild headache. She otherwise denies lightheadedness, dizziness, SOB, CP, numbness, tingling, SOB, fever, ABD pain.    CONSTITUTIONAL: No weakness. No fatigue. No fever.  HEAD: No head trauma.   EYES: No vision changes.  ENT: No hearing changes or tinnitus. No ear pain. No changes in smell. No nasal congestion or discharge. No sore throat. No voice hoarseness.   NECK: No neck pain or stiffness. No lumps.  RESPIRATORY: No cough. No SOB. No wheezing. No hemoptysis.   CARDIOVASCULAR: No chest pain. No palpitations.   GASTROINTESTINAL: No dysphagia. No ABD pain. No distension. No constipation. No diarrhea. No pain with defecation. No hematemesis. No hematochezia or melena.  BACK: No back pain.  GENITOURINARY: No dysuria. No frequency or urgency. No hesitancy. No incontinence. No urinary retention. No suprapubic pain. No hematuria.  EXTREMITY: No swelling.  MUSCULOSKELETAL: No joint pain or swelling. No fractures. No stiffness.    SKIN: No rashes. No itching. No skin, hair, or nail changes.  NEUROLOGICAL: +Slight headache. No weakness or paralysis. No lightheadedness or dizziness. No numbness or tingling.   PSYCHIATRIC: No depression.       OBJECTIVE:  Vital Signs Last 24 Hrs  T(C): 37.1 (27 Jan 2024 13:07), Max: 37.1 (27 Jan 2024 13:07)  T(F): 98.7 (27 Jan 2024 13:07), Max: 98.7 (27 Jan 2024 13:07)  HR: 77 (27 Jan 2024 13:07) (72 - 89)  BP: 155/96 (27 Jan 2024 13:07) (115/88 - 159/102)  BP(mean): --  RR: 17 (27 Jan 2024 13:07) (15 - 17)  SpO2: 100% (27 Jan 2024 13:07) (100% - 100%)    Parameters below as of 27 Jan 2024 13:07  Patient On (Oxygen Delivery Method): room air      I&O's Summary      MEDICATIONS  (STANDING):  enoxaparin Injectable 40 milliGRAM(s) SubCutaneous every 24 hours  levETIRAcetam  IVPB 1000 milliGRAM(s) IV Intermittent every 12 hours    MEDICATIONS  (PRN):  acetaminophen     Tablet .. 650 milliGRAM(s) Oral every 6 hours PRN Temp greater or equal to 38C (100.4F), Mild Pain (1 - 3)  labetalol Injectable 10 milliGRAM(s) IV Push every 6 hours PRN SBP >175  melatonin 3 milliGRAM(s) Oral at bedtime PRN Insomnia    Allergies    penicillins (Anaphylaxis)  shellfish (Rash)    Intolerances        CONSTITUTIONAL: No acute distress. Awake and alert.  HEAD: No evidence of trauma. Structures WNL.  EYES: +PERRL. +EOMI. No scleral icterus. No conjunctival injection.  ENT: Moist oral mucosa. No erythema. No pharyngeal exudates.   NECK: Supple. Appropriate ROM. No stiffness. No masses or lymphadenopathy.  RESPIRATORY: CTAB. No wheezes, rales, or rhonchi. No accessory muscle use. No apparent respiratory distress.  CARDIOVASCULAR: +S1/S2. No audible S3/S4. Regular rate and rhythm. No murmurs, rubs, or gallops. 2+ radial pulses x b/l UE; 2+ DP pulses x b/l LE. No LE swelling or edema.  GASTROINTESTINAL: Soft, nontender, nondistended. +BS. No rebound or guarding.   BACK: No spinal or paraspinal tenderness. No CVA tenderness.  MUSCULOSKELETAL: Spontaneous movement in all extremities.  DERMATOLOGICAL: No abnormal rashes or lesions.  NEUROLOGICAL: CN 2-12 grossly intact. No focal deficits. Sensation intact x 4EXT.   PSYCHIATRIC: Appropriate affect. A&Ox3 (oriented to person, place, and time).                              14.3   5.59  )-----------( 290      ( 26 Jan 2024 11:50 )             43.6     PT/INR - ( 26 Jan 2024 11:50 )   PT: 10.4 sec;   INR: 0.93 ratio         PTT - ( 26 Jan 2024 11:50 )  PTT:34.8 sec  01-26    138  |  102  |  12  ----------------------------<  86  4.5   |  21<L>  |  0.74    Ca    9.7      26 Jan 2024 11:50    TPro  8.1  /  Alb  4.1  /  TBili  0.3  /  DBili  x   /  AST  18  /  ALT  11  /  AlkPhos  99  01-26    CAPILLARY BLOOD GLUCOSE        LIVER FUNCTIONS - ( 26 Jan 2024 11:50 )  Alb: 4.1 g/dL / Pro: 8.1 g/dL / ALK PHOS: 99 U/L / ALT: 11 U/L / AST: 18 U/L / GGT: x           CARDIAC MARKERS ( 26 Jan 2024 11:50 )  x     / x     / 71 U/L / x     / x          Urinalysis Basic - ( 26 Jan 2024 11:50 )    Color: x / Appearance: x / SG: x / pH: x  Gluc: 86 mg/dL / Ketone: x  / Bili: x / Urobili: x   Blood: x / Protein: x / Nitrite: x   Leuk Esterase: x / RBC: x / WBC x   Sq Epi: x / Non Sq Epi: x / Bacteria: x            RADIOLOGY AND ADDITIONAL TESTS:    CONSULTANT NOTES REVIEWED:    CARE DISCUSSED WITH THE FOLLOWING CONSULTANTS/PROVIDERS:

## 2024-01-27 NOTE — PROGRESS NOTE ADULT - PROBLEM SELECTOR PLAN 1
- Episode at work, followed by recurrent episodes of seizure like activity in the ED s/p ativan 4mg x1 for recurrent seizure   - CT head/perfusion as above: No evidence of significant stenosis, occlusion or dissection bilateral extracranial carotid arteries. No evidence of large vessel occlusion, definitive aneurysm or vascular   malformation intracranial circulation.  - Appreciate Neurology recs - c/w Keppra 1g BID, obtain vEEG/MRI with epilepsy protocol  - Passed bedside dysphagia screen - on regular diet

## 2024-01-27 NOTE — CHART NOTE - NSCHARTNOTEFT_GEN_A_CORE
Interim events:  Contacted by primary team with regards to new episode of convulsive activity witnessed by RN at bedside. Patient's eyes reported to be rolling, followed by stiffening and then shaking for roughly 3 minutes and was ordered for 1 mg of ativan. Patient VSS reported stable.     Updated on call neurology attending, recommended consideration of transfer to facility with available EEG staff/machines. Contacted on call epilepsy team at [facility], plan for discussion with day neurology team + primary team on 1/29/23 for potential transfer.

## 2024-01-28 LAB
ANION GAP SERPL CALC-SCNC: 12 MMOL/L — SIGNIFICANT CHANGE UP (ref 7–14)
BASOPHILS # BLD AUTO: 0.03 K/UL — SIGNIFICANT CHANGE UP (ref 0–0.2)
BASOPHILS NFR BLD AUTO: 0.7 % — SIGNIFICANT CHANGE UP (ref 0–2)
BUN SERPL-MCNC: 13 MG/DL — SIGNIFICANT CHANGE UP (ref 7–23)
CALCIUM SERPL-MCNC: 9.5 MG/DL — SIGNIFICANT CHANGE UP (ref 8.4–10.5)
CHLORIDE SERPL-SCNC: 105 MMOL/L — SIGNIFICANT CHANGE UP (ref 98–107)
CO2 SERPL-SCNC: 23 MMOL/L — SIGNIFICANT CHANGE UP (ref 22–31)
CREAT SERPL-MCNC: 0.72 MG/DL — SIGNIFICANT CHANGE UP (ref 0.5–1.3)
EGFR: 101 ML/MIN/1.73M2 — SIGNIFICANT CHANGE UP
EOSINOPHIL # BLD AUTO: 0.02 K/UL — SIGNIFICANT CHANGE UP (ref 0–0.5)
EOSINOPHIL NFR BLD AUTO: 0.5 % — SIGNIFICANT CHANGE UP (ref 0–6)
GLUCOSE SERPL-MCNC: 88 MG/DL — SIGNIFICANT CHANGE UP (ref 70–99)
HCT VFR BLD CALC: 40.9 % — SIGNIFICANT CHANGE UP (ref 34.5–45)
HGB BLD-MCNC: 13.4 G/DL — SIGNIFICANT CHANGE UP (ref 11.5–15.5)
IANC: 1.65 K/UL — LOW (ref 1.8–7.4)
IMM GRANULOCYTES NFR BLD AUTO: 0.2 % — SIGNIFICANT CHANGE UP (ref 0–0.9)
LYMPHOCYTES # BLD AUTO: 2.14 K/UL — SIGNIFICANT CHANGE UP (ref 1–3.3)
LYMPHOCYTES # BLD AUTO: 52.2 % — HIGH (ref 13–44)
MAGNESIUM SERPL-MCNC: 2 MG/DL — SIGNIFICANT CHANGE UP (ref 1.6–2.6)
MCHC RBC-ENTMCNC: 27.6 PG — SIGNIFICANT CHANGE UP (ref 27–34)
MCHC RBC-ENTMCNC: 32.8 GM/DL — SIGNIFICANT CHANGE UP (ref 32–36)
MCV RBC AUTO: 84.3 FL — SIGNIFICANT CHANGE UP (ref 80–100)
MONOCYTES # BLD AUTO: 0.25 K/UL — SIGNIFICANT CHANGE UP (ref 0–0.9)
MONOCYTES NFR BLD AUTO: 6.1 % — SIGNIFICANT CHANGE UP (ref 2–14)
MRSA PCR RESULT.: SIGNIFICANT CHANGE UP
NEUTROPHILS # BLD AUTO: 1.65 K/UL — LOW (ref 1.8–7.4)
NEUTROPHILS NFR BLD AUTO: 40.3 % — LOW (ref 43–77)
NRBC # BLD: 0 /100 WBCS — SIGNIFICANT CHANGE UP (ref 0–0)
NRBC # FLD: 0 K/UL — SIGNIFICANT CHANGE UP (ref 0–0)
PHOSPHATE SERPL-MCNC: 3.9 MG/DL — SIGNIFICANT CHANGE UP (ref 2.5–4.5)
PLATELET # BLD AUTO: 280 K/UL — SIGNIFICANT CHANGE UP (ref 150–400)
POTASSIUM SERPL-MCNC: 4 MMOL/L — SIGNIFICANT CHANGE UP (ref 3.5–5.3)
POTASSIUM SERPL-SCNC: 4 MMOL/L — SIGNIFICANT CHANGE UP (ref 3.5–5.3)
RBC # BLD: 4.85 M/UL — SIGNIFICANT CHANGE UP (ref 3.8–5.2)
RBC # FLD: 13.5 % — SIGNIFICANT CHANGE UP (ref 10.3–14.5)
S AUREUS DNA NOSE QL NAA+PROBE: SIGNIFICANT CHANGE UP
SODIUM SERPL-SCNC: 140 MMOL/L — SIGNIFICANT CHANGE UP (ref 135–145)
WBC # BLD: 4.1 K/UL — SIGNIFICANT CHANGE UP (ref 3.8–10.5)
WBC # FLD AUTO: 4.1 K/UL — SIGNIFICANT CHANGE UP (ref 3.8–10.5)

## 2024-01-28 PROCEDURE — 99233 SBSQ HOSP IP/OBS HIGH 50: CPT | Mod: GC

## 2024-01-28 PROCEDURE — 99233 SBSQ HOSP IP/OBS HIGH 50: CPT

## 2024-01-28 RX ORDER — CHLORHEXIDINE GLUCONATE 213 G/1000ML
1 SOLUTION TOPICAL DAILY
Refills: 0 | Status: DISCONTINUED | OUTPATIENT
Start: 2024-01-28 | End: 2024-02-01

## 2024-01-28 RX ORDER — MAGNESIUM SULFATE 500 MG/ML
1 VIAL (ML) INJECTION ONCE
Refills: 0 | Status: COMPLETED | OUTPATIENT
Start: 2024-01-28 | End: 2024-01-28

## 2024-01-28 RX ORDER — LABETALOL HCL 100 MG
10 TABLET ORAL EVERY 6 HOURS
Refills: 0 | Status: DISCONTINUED | OUTPATIENT
Start: 2024-01-28 | End: 2024-02-01

## 2024-01-28 RX ORDER — KETOROLAC TROMETHAMINE 30 MG/ML
15 SYRINGE (ML) INJECTION ONCE
Refills: 0 | Status: DISCONTINUED | OUTPATIENT
Start: 2024-01-28 | End: 2024-01-29

## 2024-01-28 RX ADMIN — ENOXAPARIN SODIUM 40 MILLIGRAM(S): 100 INJECTION SUBCUTANEOUS at 18:13

## 2024-01-28 RX ADMIN — Medication 1 MILLIGRAM(S): at 15:19

## 2024-01-28 RX ADMIN — Medication 650 MILLIGRAM(S): at 05:52

## 2024-01-28 RX ADMIN — Medication 100 GRAM(S): at 20:26

## 2024-01-28 RX ADMIN — LEVETIRACETAM 400 MILLIGRAM(S): 250 TABLET, FILM COATED ORAL at 09:59

## 2024-01-28 RX ADMIN — LEVETIRACETAM 400 MILLIGRAM(S): 250 TABLET, FILM COATED ORAL at 21:56

## 2024-01-28 RX ADMIN — Medication 650 MILLIGRAM(S): at 05:22

## 2024-01-28 RX ADMIN — VALSARTAN 160 MILLIGRAM(S): 80 TABLET ORAL at 05:22

## 2024-01-28 RX ADMIN — CHLORHEXIDINE GLUCONATE 1 APPLICATION(S): 213 SOLUTION TOPICAL at 10:00

## 2024-01-28 NOTE — PROGRESS NOTE ADULT - PROBLEM SELECTOR PLAN 1
- Episode at work, followed by recurrent episodes of seizure like activity in the ED s/p ativan 4mg x1 for recurrent seizure   - CT head/perfusion as above: No evidence of significant stenosis, occlusion or dissection bilateral extracranial carotid arteries. No evidence of large vessel occlusion, definitive aneurysm or vascular   malformation intracranial circulation.  - Appreciate Neurology recs - c/w Keppra 1g BID, pending vEEG/MRI with epilepsy protocol  - Passed bedside dysphagia screen - on regular diet  - Given toradol 15 IV x 1 and magnesium 1000 x 1 for headache

## 2024-01-28 NOTE — CHART NOTE - NSCHARTNOTEFT_GEN_A_CORE
Overnight event noted, this monring 9:49AM Rn reported pt with concern for another seizure episode. Per RN prior to this pt was on the phone with family member and was upset. Later on pt noted with staring spells, tears running down her eyes and blinking of her eyes.     Pt seen at bedside opens eye slowly with command, unable to follow command with LE and UE. daughter at bedside    BP elevated during episode , now on repeat 139/88    - Neuro called given not tonic clonic seizure does not recommended ativan at this time.   - given no EEG avilable at this time recommended for pt to transfer to [facility] EMU room for seizure workup  - Per neuro pt accepted to Dr Rigoberto Barboza at [facility]  - transfer center called per neuro recommendation. Spoke with coordinator  Francisco Javier   - consent for transfer in chart and spoke with pt's spouse Mr Coronel  - Above discussed with attending Dr Rush

## 2024-01-28 NOTE — CHART NOTE - NSCHARTNOTEFT_GEN_A_CORE
[facility] NIGHT MEDICINE COVERAGE.    Notified by RN, patient had seizure like activity, witnessed by family at bedside.     Per family member (sister and son) at bedside, pt had general tonic clonic seizure from approximately 6:58PM-7:03PM. Pt seen and examined at bedside, upon arrival, pts sister and son at bedside***, patient awake and responsive to name while laying in bed. Pt is A&Ox4 (name, location, situation, and time), able to vocalize that she had her third seizure today. Pt able to follow simple commands (raise arms, move toes, etc). Per family member,       ~~VITALS~~ [facility] NIGHT MEDICINE COVERAGE.    Notified by RN, patient had seizure like activity, witnessed by family at bedside.     Per family member (sister, son and ) at bedside, pt had general tonic clonic seizure from approximately 6:58PM-7:03PM. Pt seen and examined at bedside, upon arrival, pts sister, son & , Morgan) at bedside, patient awake and responsive to name while laying in bed, EEG cap on patient. Pt is A&Ox4 (name, location, situation, and time), able to vocalize that she had her third seizure today. Pt able to follow simple commands (raise arms, move toes, etc). Per family member, they are concerned regarding number of doses of ativan-- however discussed that patient has only been getting 1mg IVP Ativan x1 per day so far d/t her seizure activity. Pt and family concerned for elevated BP, however discussed that it is all driven by her seizure, and that she has medications in place if her SBP >170.       ~~VITALS~~ [facility] NIGHT MEDICINE COVERAGE.    Notified by RN, patient had seizure like activity, witnessed by family members at bedside. Per pts Sister, pt had general tonic clonic seizure from approximately 6:58PM-7:03PM. Pt seen and examined at bedside, upon arrival, pts sister, son &  (Morgan) at bedside, patient awake and responsive to name while laying in bed, EEG cap on patient. Pt is A&Ox4 (name, location, situation, and time), able to vocalize that she had her third seizure today. Pt able to follow simple commands (raise arms, move toes, etc). Per family member, they are concerned regarding number of doses of ativan-- however discussed that patient has only been getting 1mg IVP Ativan x1 per day so far d/t her seizure activity. Pt and family concerned for elevated BP, however discussed that it is all driven by her seizure, and that she has medications in place if her SBP >170 PRN.     Pt complaining of slight headache at this time, per family member pt will complain of severe HA and then a seizure follows. Magnesium and Toradol ordered by Hospitalist during the day. Mg to be given first, will reassess patient and see if Toradol still needed. Pts chart with Transfer Paperwork completed. Transfer center called--transfer cancelled at this time due to lack of beds at [facility]. EEG Tech to be called in AM to set patient up for vEEG. MRI w/wo pending.     Vital Signs Last 24 Hrs  T(C): 36.8 (28 Jan 2024 19:45), Max: 36.8 (28 Jan 2024 10:55)  T(F): 98.3 (28 Jan 2024 19:45), Max: 98.3 (28 Jan 2024 19:45)  HR: 82 (28 Jan 2024 19:45) (68 - 82)  BP: 143/83 (28 Jan 2024 19:45) (134/91 - 184/113)  RR: 18 (28 Jan 2024 19:45) (16 - 18)  SpO2: 96% (28 Jan 2024 19:45) (96% - 100%)    Parameters below as of 28 Jan 2024 19:45  Patient On (Oxygen Delivery Method): room air    Sharita Park PA  [facility] [id number] [facility] NIGHT MEDICINE COVERAGE.    Notified by RN, patient had seizure like activity, witnessed by family members at bedside. Per pts Sister, pt had general tonic clonic seizure from approximately 6:58PM-7:03PM. Pt seen and examined at bedside, upon arrival, pts sister, son &  (Morgan) at bedside, patient awake and responsive to name while laying in bed, EEG cap on patient, vEEG NOT at bedside. Pt is A&Ox4 (name, location, situation, and time), able to vocalize that she had her third seizure today. Pt able to follow simple commands (raise arms, move toes, etc). Per family member, they are concerned regarding number of doses of ativan-- however discussed that patient has only been getting 1mg IVP Ativan x1 per day so far d/t her seizure activity. Pt and family concerned for elevated BP, however discussed that it is all driven by her seizure, and that she has medications in place if her SBP >170 PRN.     Pt complaining of slight headache at this time, per family member pt will complain of severe HA and then a seizure follows. Magnesium and Toradol ordered by Hospitalist during the day. Mg to be given first, will reassess patient and see if Toradol still needed. Pts chart with Transfer Paperwork completed. Transfer center called this evening--transfer cancelled at this time due to lack of beds at [facility]. EEG Tech to be called in AM to set patient up for vEEG. MRI w/wo pending. Family and RN aware of plan.     Vital Signs Last 24 Hrs  T(C): 36.8 (28 Jan 2024 19:45), Max: 36.8 (28 Jan 2024 10:55)  T(F): 98.3 (28 Jan 2024 19:45), Max: 98.3 (28 Jan 2024 19:45)  HR: 82 (28 Jan 2024 19:45) (68 - 82)  BP: 143/83 (28 Jan 2024 19:45) (134/91 - 184/113)  RR: 18 (28 Jan 2024 19:45) (16 - 18)  SpO2: 96% (28 Jan 2024 19:45) (96% - 100%)    Parameters below as of 28 Jan 2024 19:45  Patient On (Oxygen Delivery Method): room air    Sharita Park PA  [facility] [id number]

## 2024-01-28 NOTE — PROGRESS NOTE ADULT - TIME BILLING
Patient seen and examined at bedside. During follow up visit, patient's daughter was present and I appreciate that. Last night patient has seizure and received the ativan 1 mg.   I agree with the findings and plan as documented in the Resident's note except below.  Currently patient is back to baseline and non focal exam.   Etiology of new onset of seizure is uncertain and patient will benefit from further work up to rule out treatable etiologies. Initially, she was accepted to [facility] for uncontrolled seizure since we do not have EEG technician availability during weekend. But afterwords resident was notified there is no bed available.   One hour EEG did not showed any seizure or epileptiform potentials.   Patient will benefit from reconnect to VEEG to capture the events.   MRI brain with seizure protocol.   Meanwhile continue Keppra 1 gm BID until she connected to EEG.   Continue medical management, neuro- check and fall precaution.  GI and DVT prophylaxis.    Seizure Precautions discussed:  1. No driving for 1 year from date of last seizure as per [place] law  2. No taking a bath alone or showering with standing water > 2 inches  3. No swimming unsupervised  4. No use of automatic or semi-automatic firearms and no using other firearms unsupervised  5. No use of heavy machinery unsupervised  6. No cooking over an open flame on the front burners (back burners OK)  7. For additional recommendations please discuss with neurology or PCP as outpatient    I discussed the diagnosis and treatment plan with the patient and daughter.  All questions and concerns were addressed. The patient and daughter demonstrated good understanding of the treatment plan.  My cumulative time taking care of this patient is 55 minutes  If you have any further questions, please do not hesitate to contact our consult service.  Thank you for allowing us to participate in this patient care.

## 2024-01-28 NOTE — PROGRESS NOTE ADULT - SUBJECTIVE AND OBJECTIVE BOX
Contact Information:  Nik Rush II, MD, MPH  Internal Medicine    LIZZIE RAHMAN, MRN-[id number]    Patient is a 53y old  Female who presents with a chief complaint of seizure (28 Jan 2024 12:27)      OVERNIGHT EVENTS/INTERVAL/SUBJECTIVE: Overnight patient noted to have had 2 seizure events; discussed with Neuro, arranged to be transferred to [facility], however, no beds available, patient to remain at [facility]. Patient examined at bedside, somnolent, but able to state that she has a headache. She does not endorse any other symptoms.    CONSTITUTIONAL: No weakness. No fatigue. No fever.  HEAD: No head trauma.   EYES: No vision changes.  ENT: No hearing changes or tinnitus. No ear pain. No changes in smell. No nasal congestion or discharge. No sore throat. No voice hoarseness.   NECK: No neck pain or stiffness. No lumps.  RESPIRATORY: No cough. No SOB. No wheezing. No hemoptysis.   CARDIOVASCULAR: No chest pain. No palpitations.   GASTROINTESTINAL: No dysphagia. No ABD pain. No distension. No constipation. No diarrhea. No pain with defecation. No hematemesis. No hematochezia or melena.  BACK: No back pain.  GENITOURINARY: No dysuria. No frequency or urgency. No hesitancy. No incontinence. No urinary retention. No suprapubic pain. No hematuria.  EXTREMITY: No swelling.  MUSCULOSKELETAL: No joint pain or swelling. No fractures. No stiffness.    SKIN: No rashes. No itching. No skin, hair, or nail changes.  NEUROLOGICAL: +HA. No weakness or paralysis. No lightheadedness or dizziness. No HA. No numbness or tingling.   PSYCHIATRIC: No depression.       OBJECTIVE:  Vital Signs Last 24 Hrs  T(C): 36.8 (28 Jan 2024 10:55), Max: 36.8 (28 Jan 2024 10:55)  T(F): 98.2 (28 Jan 2024 10:55), Max: 98.2 (28 Jan 2024 10:55)  HR: 74 (28 Jan 2024 14:50) (68 - 81)  BP: 173/99 (28 Jan 2024 14:50) (134/91 - 184/113)  BP(mean): --  RR: 18 (28 Jan 2024 14:50) (16 - 18)  SpO2: 100% (28 Jan 2024 14:50) (97% - 100%)    Parameters below as of 28 Jan 2024 14:50  Patient On (Oxygen Delivery Method): room air      I&O's Summary      MEDICATIONS  (STANDING):  chlorhexidine 2% Cloths 1 Application(s) Topical daily  enoxaparin Injectable 40 milliGRAM(s) SubCutaneous every 24 hours  ketorolac   Injectable 15 milliGRAM(s) IV Push once  levETIRAcetam  IVPB 1000 milliGRAM(s) IV Intermittent every 12 hours  magnesium sulfate  IVPB 1 Gram(s) IV Intermittent once  valsartan 160 milliGRAM(s) Oral daily    MEDICATIONS  (PRN):  acetaminophen     Tablet .. 650 milliGRAM(s) Oral every 6 hours PRN Temp greater or equal to 38C (100.4F), Mild Pain (1 - 3)  labetalol Injectable 10 milliGRAM(s) IV Push every 6 hours PRN SBP >170  melatonin 3 milliGRAM(s) Oral at bedtime PRN Insomnia    Allergies    penicillins (Anaphylaxis)  shellfish (Rash)    Intolerances        CONSTITUTIONAL: No acute distress. Somnolent, intermittently arousable.  RESPIRATORY: CTAB. No wheezes, rales, or rhonchi. No accessory muscle use. No apparent respiratory distress.  CARDIOVASCULAR: +S1/S2. No audible S3/S4. Regular rate and rhythm. No murmurs, rubs, or gallops. No LE swelling or edema.  GASTROINTESTINAL: Soft, nontender, nondistended. +BS. No rebound or guarding.   MUSCULOSKELETAL: Very sparse movements.  NEUROLOGICAL: Limited assessment due to mental status.  PSYCHIATRIC: Somnolent affect. A&Ox3.                              13.4   4.10  )-----------( 280      ( 28 Jan 2024 05:18 )             40.9       01-28    140  |  105  |  13  ----------------------------<  88  4.0   |  23  |  0.72    Ca    9.5      28 Jan 2024 05:18  Phos  3.9     01-28  Mg     2.00     01-28      CAPILLARY BLOOD GLUCOSE              Urinalysis Basic - ( 28 Jan 2024 05:18 )    Color: x / Appearance: x / SG: x / pH: x  Gluc: 88 mg/dL / Ketone: x  / Bili: x / Urobili: x   Blood: x / Protein: x / Nitrite: x   Leuk Esterase: x / RBC: x / WBC x   Sq Epi: x / Non Sq Epi: x / Bacteria: x            RADIOLOGY AND ADDITIONAL TESTS:    CONSULTANT NOTES REVIEWED:    CARE DISCUSSED WITH THE FOLLOWING CONSULTANTS/PROVIDERS:

## 2024-01-28 NOTE — CHART NOTE - NSCHARTNOTEFT_GEN_A_CORE
called by RN pt had recovered slight from am episode of AMS and aphasia, was communicating.   Per RN pt just earlier noted by family to stiffened up and turn to the right side. Again now aphasic and unble to communicated. already 6 mins into the event     pt assess at bedside laying shifted to the right including the head. L pupil reactive. R pupil fixed, follow some command to squeeze the had slightly. Not withdrawling to pain     Given recurrent episode Neurology recommended 1mg ativan IV X1 and monitor. pending vEEG  NPO for now until MS improve repeat dysphagia screen.

## 2024-01-28 NOTE — PROGRESS NOTE ADULT - PROBLEM SELECTOR PLAN 2
- Home medication: amlodipine-valsartan   - Hypertense in the ED - likely transiently from seizure? vs hypertensive emergency  - C/w valsartan 160, c/w labetalol PRN  - If persistently hypertensive, can add back amlodipine

## 2024-01-28 NOTE — PROGRESS NOTE ADULT - SUBJECTIVE AND OBJECTIVE BOX
Neurology Progress Note    SUBJECTIVE/OBJECTIVE/INTERVAL EVENTS: Patient seen and examined at bedside w/ neuro attending and team.     INTERVAL HISTORY:  Overnight: Neurology [facility] resident contacted regarding new episode of "convulsive activity" witnessed by RN at bedside. Patient's eyes reported to be rolling, followed by stiffening and then shaking for roughly 3 minutes and was ordered for 1 mg of lorazepam. Patient VSS reported stable. Recommendation by Neurology team at this time was for transfer to facility with available EEG staff/machines. Contacted on call epilepsy team at [place], plan for discussion with day neurology team + primary team on 1/28/24 for potential transfer.    This AM: at 9:43 - patient was noted to be "staring with tears coming down." Slight tracking with pupils but was not responsive. Reportedly A&Ox4? Lethargic. Vitals: /113 O2 100% pulse 77. Per primary team, who paged Neurology - episode lasted at least 10 minutes.    Patient seen individually this AM immediately after I was called by primary team with c/f seizure-like activity. Patient lethargic, but awakens to name, attending to examiner, following all commands. Patient seen again at bedside with Dr. Lopez this AM - patient is awake, alert, responding to questions appropriately, following all commands.    REVIEW OF SYSTEMS: Few questions of a 10-system ROS was performed and is negative except for those items noted above and/or in the HPI.    VITALS & EXAMINATION:  Vital Signs Last 24 Hrs  T(C): 36.8 (28 Jan 2024 10:55), Max: 37.1 (27 Jan 2024 13:07)  T(F): 98.2 (28 Jan 2024 10:55), Max: 98.7 (27 Jan 2024 13:07)  HR: 75 (28 Jan 2024 10:55) (68 - 81)  BP: 139/88 (28 Jan 2024 10:55) (134/91 - 184/113)  BP(mean): --  RR: 18 (28 Jan 2024 10:55) (16 - 18)  SpO2: 100% (28 Jan 2024 10:55) (97% - 100%)    Parameters below as of 28 Jan 2024 10:55  Patient On (Oxygen Delivery Method): room air        General:  Constitutional: Female, appears stated age, nontoxic, not in distress,    Head: Normocephalic; vEEG leads in place (not connected)  Eyes: clear sclera;   Extremities: No cyanosis;   Resp: breathing comfortably on room air  Neck: no nuchal rigidity    Neurological (>12):  MS: Awake, alert.  Oriented person place situation. Follows all commands including cross commands. Attends to examiner. Fund of knowledge intact.  Language: Speech is clear, fluent  CNs: PERRL. VFF. EOMI. No disconjugate gaze, nystagmus. V1-3 intact LT, No facial asymmetry b/l. Hearing grossly normal b/l. Tongue midline and can move side to side.   Motor - Normal bulk and tone throughout. No drift in the UEs/LEs.  Sensation: Intact to LT b/l. Cortical: Extinction on DSS (neglect): none  Reflexes L/R:  Grossly symmetrical throughout  Coordination: No dysmetria to FTN b/l UE  Gait: ADAN d/t fall risk    LABORATORY:  CBC                       13.4   4.10  )-----------( 280      ( 28 Jan 2024 05:18 )             40.9     Chem 01-28    140  |  105  |  13  ----------------------------<  88  4.0   |  23  |  0.72    Ca    9.5      28 Jan 2024 05:18  Phos  3.9     01-28  Mg     2.00     01-28      LFTs   Coagulopathy   Lipid Panel   A1c   Cardiac enzymes     U/A Urinalysis Basic - ( 28 Jan 2024 05:18 )    Color: x / Appearance: x / SG: x / pH: x  Gluc: 88 mg/dL / Ketone: x  / Bili: x / Urobili: x   Blood: x / Protein: x / Nitrite: x   Leuk Esterase: x / RBC: x / WBC x   Sq Epi: x / Non Sq Epi: x / Bacteria: x      CSF  Immunological  Other    STUDIES & IMAGING: (EEG, CT, MR, U/S, TTE/SARBJIT):  CT BRAIN  1. No significant interval change.  2. No evidence of acute hemorrhage, vasogenic edema or territorial infarct.  3. Additional findings described in detail above.    CT PERFUSION  1. No predicted core infarct.  2. No evidence of active ischemia-tissue at risk.    CTA  1. Vertebral arteries patent.  2. No evidence of significant stenosis, occlusion or dissection bilateral extracranial carotid arteries.  3. No evidence of large vessel occlusion, definitive aneurysm or vascular malformation intracranial circulation.  4. Additional findings described in detail above, including prominence of the nasopharyngeal adenoidal tissue, of uncertain etiology, and multiple hypodense thyroid nodules. Recommending nonemergent ENT clinical evaluation and thyroid ultrasound.    EEG (1/26):  EEG Classification / Summary:  Normal >1.5-hour video-EEG recorded mostly in stage 2 sleep with brief arousals. Sustained wakefulness is not captured.  No focal or epileptiform abnormalities are captured.     Clinical Impression:  A normal EEG neither refutes nor supports a diagnosis of epilepsy.

## 2024-01-29 LAB
ANION GAP SERPL CALC-SCNC: 11 MMOL/L — SIGNIFICANT CHANGE UP (ref 7–14)
APPEARANCE UR: ABNORMAL
BACTERIA # UR AUTO: ABNORMAL /HPF
BASOPHILS # BLD AUTO: 0.03 K/UL — SIGNIFICANT CHANGE UP (ref 0–0.2)
BASOPHILS NFR BLD AUTO: 0.6 % — SIGNIFICANT CHANGE UP (ref 0–2)
BILIRUB UR-MCNC: NEGATIVE — SIGNIFICANT CHANGE UP
BUN SERPL-MCNC: 12 MG/DL — SIGNIFICANT CHANGE UP (ref 7–23)
CALCIUM SERPL-MCNC: 9.4 MG/DL — SIGNIFICANT CHANGE UP (ref 8.4–10.5)
CAST: 2 /LPF — SIGNIFICANT CHANGE UP (ref 0–4)
CHLORIDE SERPL-SCNC: 106 MMOL/L — SIGNIFICANT CHANGE UP (ref 98–107)
CO2 SERPL-SCNC: 22 MMOL/L — SIGNIFICANT CHANGE UP (ref 22–31)
COLOR SPEC: YELLOW — SIGNIFICANT CHANGE UP
CREAT SERPL-MCNC: 0.69 MG/DL — SIGNIFICANT CHANGE UP (ref 0.5–1.3)
DIFF PNL FLD: ABNORMAL
EGFR: 104 ML/MIN/1.73M2 — SIGNIFICANT CHANGE UP
EOSINOPHIL # BLD AUTO: 0.03 K/UL — SIGNIFICANT CHANGE UP (ref 0–0.5)
EOSINOPHIL NFR BLD AUTO: 0.6 % — SIGNIFICANT CHANGE UP (ref 0–6)
GLUCOSE SERPL-MCNC: 63 MG/DL — LOW (ref 70–99)
GLUCOSE UR QL: NEGATIVE MG/DL — SIGNIFICANT CHANGE UP
HCT VFR BLD CALC: 41.1 % — SIGNIFICANT CHANGE UP (ref 34.5–45)
HGB BLD-MCNC: 13.6 G/DL — SIGNIFICANT CHANGE UP (ref 11.5–15.5)
IANC: 2.23 K/UL — SIGNIFICANT CHANGE UP (ref 1.8–7.4)
IMM GRANULOCYTES NFR BLD AUTO: 0.2 % — SIGNIFICANT CHANGE UP (ref 0–0.9)
KETONES UR-MCNC: NEGATIVE MG/DL — SIGNIFICANT CHANGE UP
LEUKOCYTE ESTERASE UR-ACNC: ABNORMAL
LYMPHOCYTES # BLD AUTO: 2.58 K/UL — SIGNIFICANT CHANGE UP (ref 1–3.3)
LYMPHOCYTES # BLD AUTO: 49 % — HIGH (ref 13–44)
MAGNESIUM SERPL-MCNC: 2.2 MG/DL — SIGNIFICANT CHANGE UP (ref 1.6–2.6)
MCHC RBC-ENTMCNC: 27.6 PG — SIGNIFICANT CHANGE UP (ref 27–34)
MCHC RBC-ENTMCNC: 33.1 GM/DL — SIGNIFICANT CHANGE UP (ref 32–36)
MCV RBC AUTO: 83.5 FL — SIGNIFICANT CHANGE UP (ref 80–100)
MONOCYTES # BLD AUTO: 0.38 K/UL — SIGNIFICANT CHANGE UP (ref 0–0.9)
MONOCYTES NFR BLD AUTO: 7.2 % — SIGNIFICANT CHANGE UP (ref 2–14)
NEUTROPHILS # BLD AUTO: 2.23 K/UL — SIGNIFICANT CHANGE UP (ref 1.8–7.4)
NEUTROPHILS NFR BLD AUTO: 42.4 % — LOW (ref 43–77)
NITRITE UR-MCNC: NEGATIVE — SIGNIFICANT CHANGE UP
NRBC # BLD: 0 /100 WBCS — SIGNIFICANT CHANGE UP (ref 0–0)
NRBC # FLD: 0 K/UL — SIGNIFICANT CHANGE UP (ref 0–0)
PH UR: 7.5 — SIGNIFICANT CHANGE UP (ref 5–8)
PHOSPHATE SERPL-MCNC: 3.6 MG/DL — SIGNIFICANT CHANGE UP (ref 2.5–4.5)
PLATELET # BLD AUTO: 308 K/UL — SIGNIFICANT CHANGE UP (ref 150–400)
POTASSIUM SERPL-MCNC: 4.3 MMOL/L — SIGNIFICANT CHANGE UP (ref 3.5–5.3)
POTASSIUM SERPL-SCNC: 4.3 MMOL/L — SIGNIFICANT CHANGE UP (ref 3.5–5.3)
PROT UR-MCNC: NEGATIVE MG/DL — SIGNIFICANT CHANGE UP
RBC # BLD: 4.92 M/UL — SIGNIFICANT CHANGE UP (ref 3.8–5.2)
RBC # FLD: 13.3 % — SIGNIFICANT CHANGE UP (ref 10.3–14.5)
RBC CASTS # UR COMP ASSIST: 0 /HPF — SIGNIFICANT CHANGE UP (ref 0–4)
REVIEW: SIGNIFICANT CHANGE UP
SODIUM SERPL-SCNC: 139 MMOL/L — SIGNIFICANT CHANGE UP (ref 135–145)
SP GR SPEC: 1.01 — SIGNIFICANT CHANGE UP (ref 1–1.03)
SQUAMOUS # UR AUTO: 1 /HPF — SIGNIFICANT CHANGE UP (ref 0–5)
UROBILINOGEN FLD QL: 1 MG/DL — SIGNIFICANT CHANGE UP (ref 0.2–1)
WBC # BLD: 5.26 K/UL — SIGNIFICANT CHANGE UP (ref 3.8–10.5)
WBC # FLD AUTO: 5.26 K/UL — SIGNIFICANT CHANGE UP (ref 3.8–10.5)
WBC UR QL: 46 /HPF — HIGH (ref 0–5)

## 2024-01-29 PROCEDURE — 95720 EEG PHY/QHP EA INCR W/VEEG: CPT

## 2024-01-29 PROCEDURE — 99232 SBSQ HOSP IP/OBS MODERATE 35: CPT

## 2024-01-29 RX ORDER — ACETAMINOPHEN 500 MG
1000 TABLET ORAL ONCE
Refills: 0 | Status: COMPLETED | OUTPATIENT
Start: 2024-01-29 | End: 2024-01-29

## 2024-01-29 RX ORDER — MAGNESIUM SULFATE 500 MG/ML
1 VIAL (ML) INJECTION ONCE
Refills: 0 | Status: COMPLETED | OUTPATIENT
Start: 2024-01-29 | End: 2024-01-29

## 2024-01-29 RX ADMIN — VALSARTAN 160 MILLIGRAM(S): 80 TABLET ORAL at 06:03

## 2024-01-29 RX ADMIN — ENOXAPARIN SODIUM 40 MILLIGRAM(S): 100 INJECTION SUBCUTANEOUS at 18:41

## 2024-01-29 RX ADMIN — Medication 650 MILLIGRAM(S): at 09:15

## 2024-01-29 RX ADMIN — Medication 100 GRAM(S): at 12:43

## 2024-01-29 RX ADMIN — Medication 650 MILLIGRAM(S): at 08:37

## 2024-01-29 RX ADMIN — CHLORHEXIDINE GLUCONATE 1 APPLICATION(S): 213 SOLUTION TOPICAL at 19:06

## 2024-01-29 RX ADMIN — Medication 15 MILLIGRAM(S): at 10:41

## 2024-01-29 RX ADMIN — Medication 15 MILLIGRAM(S): at 11:13

## 2024-01-29 RX ADMIN — Medication 400 MILLIGRAM(S): at 19:06

## 2024-01-29 NOTE — CHART NOTE - NSCHARTNOTEFT_GEN_A_CORE
EEG preliminary read (not final) on the initial recording hour(s) = 1.5 hr    Event reported at 09:54: Likely not epileptic in nature.    Clinically: On video, others are in the room with patient, patient has eyes open and is looking around. Then patient has eyes closed, head initially slightly turned to R, then to L, intermittent low-amplitude slightly spasmodic truncal flexion/extension movements while reclining in bed. When provider opens patient's eyes, there is leftward gaze followed by midline and rightward gaze. Patient has antigravity effort in BUE when instructed during exam.  Neurology team reported eyes closed, leftward gaze, BLE shaking, and truncal flexion/extension movements. Limited evaluation of BLE on video due to camera angle and bed sheet.  EEG shows no epileptiform abnormalities or electrographic seizure.    Final report to follow tomorrow morning after completion of study.    [facility] EEG Reading Room Ph#: [phone]  Epilepsy Answering Service after 5PM and before 8:30AM: Ph#: [phone]

## 2024-01-29 NOTE — PROGRESS NOTE ADULT - SUBJECTIVE AND OBJECTIVE BOX
Neurology Progress Note    SUBJECTIVE/OBJECTIVE/INTERVAL EVENTS: Patient seen and examined at bedside w/ neuro attending and team.     INTERVAL HISTORY: Called by primary team regarding seizure-like activity. Described as eyes closed, but when opened, pt has left gaze preference. Also, noted to have bilateral lower extremity "shaking" and truncal extension/flexion. Lasted 10minutes w/o intervention. Neuro evaluated pt at bedside. Pt following commands and no further abnormal movements. No tongue biting or urinary incontinence.     REVIEW OF SYSTEMS: unable to assess due to pt mental status.     VITALS & EXAMINATION:  Vital Signs Last 24 Hrs  T(C): 36.7 (29 Jan 2024 10:00), Max: 36.8 (28 Jan 2024 19:45)  T(F): 98.1 (29 Jan 2024 10:00), Max: 98.3 (28 Jan 2024 19:45)  HR: 78 (29 Jan 2024 10:00) (68 - 82)  BP: 186/113 (29 Jan 2024 10:00) (135/84 - 186/113)  BP(mean): --  RR: 18 (29 Jan 2024 10:00) (17 - 18)  SpO2: 98% (29 Jan 2024 10:00) (96% - 100%)    Parameters below as of 29 Jan 2024 10:00  Patient On (Oxygen Delivery Method): room air    General:  Constitutional: Female, appears stated age, nontoxic, not in distress,    Head: Normocephalic;  Eyes: clear sclera;   Extremities: No cyanosis;   Resp: breathing comfortably on room air  Neck: no nuchal rigidity    Neurological (>12):  MS: Eyes open w/ left gaze preference. Follows all simple commands.   Language: Non-verbal at this time during exam.   CNs: PERRL. BTT intact b/l. EOMI. Left gaze preference. V1-3 intact LT, No overt facial asymmetry b/l. Tongue midline.  Motor: Normal bulk and tone throughout. Drift in all four extremities.   Sensation: w/d to noxious stimuli in all 4 extremities.   Reflexes L/R: Grossly symmetrical throughout  Coordination: Unable to assess due to pt mental status   Gait: Unable to assess due to pt mental status     LABORATORY:  CBC                       13.6   5.26  )-----------( 308      ( 29 Jan 2024 04:46 )             41.1     Chem 01-29    139  |  106  |  12  ----------------------------<  63<L>  4.3   |  22  |  0.69    Ca    9.4      29 Jan 2024 04:46  Phos  3.6     01-29  Mg     2.20     01-29    LFTs   Coagulopathy   Lipid Panel   A1c   Cardiac enzymes     U/A Urinalysis Basic - ( 29 Jan 2024 04:46 )    Color: x / Appearance: x / SG: x / pH: x  Gluc: 63 mg/dL / Ketone: x  / Bili: x / Urobili: x   Blood: x / Protein: x / Nitrite: x   Leuk Esterase: x / RBC: x / WBC x   Sq Epi: x / Non Sq Epi: x / Bacteria: x      CSF  Immunological  Other    STUDIES & IMAGING: (EEG, CT, MR, U/S, TTE/SARBJIT):  < from: CT Brain Perfusion Maps Stroke (01.26.24 @ 11:23) >    CT BRAIN  1. No significant interval change.  2. No evidence of acute hemorrhage, vasogenic edema or territorial   infarct.  3. Additional findings described in detail above.    CT PERFUSION  1. No predicted core infarct.  2. No evidence of active ischemia-tissue at risk.    CTA  1. Vertebral arteries patent.  2. No evidence of significant stenosis, occlusion or dissection bilateral   extracranial carotid arteries.  3. No evidence of large vessel occlusion, definitive aneurysm or vascular   malformation intracranial circulation.  4. Additional findings described in detail above, including prominence of   the nasopharyngeal adenoidal tissue, of uncertain etiology, and multiple   hypodense thyroid nodules. Recommending nonemergent ENT clinical   evaluation and thyroid ultrasound.    < end of copied text >

## 2024-01-29 NOTE — CHART NOTE - NSCHARTNOTEFT_GEN_A_CORE
Pt seen and examined this AM at 9:54 for seizure like episode. Upon arrival patient had slightly spasmodic truncal flexion/extension movements with eyes closed. Pt noted ot have leftward gaze and antigravity effort in B/L UE. BP at that time elevated 186/113. HR and O2 Sat WNL. Neurology evaluated patient at bedside and spoke w/ EEG attending who read EEG that did not show any epileptiform abnormalities or electrographic seizure. Neuro recommended discontinuing Keppra.  Discussed with Dr. Jolly.    Pt then noted w/ another seizure in the afternoon at 1349. At that time, PT obtunded no convulsions noted. Pt had left gaze deviation. /126. HR and O2 sat stable. Neurology made aware who again called EEG to interpret reading which was again negative for epileptiform abnormalities or electrographic seizure.     Pt then noted w/ another seizure in the evening at 1735. Pt had muscle tension, arched back, bent legs. Again w/ left gaze deviation. /120 pulse 93 O2 98. Neurology made are who will interpret EEG. Pt seen and examined this AM at 9:54 for seizure like episode. Pt reports having a headache prior to episode and states this is typical for her to have headache as a prodrome to her seizures. Upon arrival patient had slightly spasmodic truncal flexion/extension movements with eyes closed. Pt noted ot have leftward gaze and antigravity effort in B/L UE. BP at that time elevated 186/113. HR and O2 Sat WNL. Neurology evaluated patient at bedside and spoke w/ EEG attending who read EEG that did not show any epileptiform abnormalities or electrographic seizure. Neuro recommended discontinuing Keppra.  Discussed with Dr. Jolly.    Pt then noted w/ another seizure in the afternoon at 1349. At that time, PT obtunded no convulsions noted. Pt had left gaze deviation. /126. HR and O2 sat stable. Neurology made aware who again called EEG to interpret reading which was again negative for epileptiform abnormalities or electrographic seizure.     Pt then noted w/ another seizure in the evening at 1735. Pt had muscle tension, arched back, bent legs. Again w/ left gaze deviation. /120 pulse 93 O2 98. Neurology made are who will interpret EEG. Pt seen and examined this AM at 9:54 for seizure like episode. Pt reports having a headache prior to episode and states this is typical for her to have headache as a prodrome to her seizures. Upon arrival patient had slightly spasmodic truncal flexion/extension movements with eyes closed. Pt noted ot have leftward gaze and antigravity effort in B/L UE. BP at that time elevated 186/113. HR and O2 Sat WNL. Neurology evaluated patient at bedside and spoke w/ EEG attending who read EEG that did not show any epileptiform abnormalities or electrographic seizure. Neuro recommended discontinuing Keppra.  Discussed with Dr. Jolly.    Pt then noted w/ another seizure in the afternoon at 1349. At that time, PT obtunded no convulsions noted. Pt had left gaze deviation. /126. HR and O2 sat stable. Neurology made aware who again called EEG to interpret reading which was again negative for epileptiform abnormalities or electrographic seizure.     Pt then noted w/ another seizure in the evening at 1735. Pt had muscle tension, arched back, bent legs. Again w/ left gaze deviation. /120 pulse 93 O2 98. Neurology made are who will interpret EEG.    Addendum: UA was ordered this AM to r/o infxn as possible trigger. UA came back positive. Ucx ordered. Once ucx is sent, will start IV Abx.

## 2024-01-29 NOTE — PROGRESS NOTE ADULT - ATTENDING COMMENTS
Ms. Brown is a 52 yo woman with episode most consistent with psychogenic nonepileptic spells.  No antiseizure medications.  Continue EEG.  Neurology resident d/w primary team.   Thank you

## 2024-01-29 NOTE — PROVIDER CONTACT NOTE (OTHER) - ACTION/TREATMENT ORDERED:
provider notified
provider notified
medication keppra given early
provider notified

## 2024-01-29 NOTE — PROVIDER CONTACT NOTE (OTHER) - ASSESSMENT
A&O 0 at this time obtunded
A&O4 lethargic vitals 184/113 O2 100% pulse 77
A&O 0 obtunded /26 Pulse 82 O2 100 on room air
A&O 0 obtunded. /120 pulse 93 O2 98
A&O 0 obtunded at this time. vitals 173/99 pulse 74.
A&O 0 at this time obtunded vitals 186/113 Pulse 78 O2 98% on room air

## 2024-01-29 NOTE — PROGRESS NOTE ADULT - SUBJECTIVE AND OBJECTIVE BOX
[facility]  Hospitalist  Pager- [phone]      LIZZIE RAHMAN, MRN-[id number]    Patient is a 53y old  Female who presents with a chief complaint of seizure (28 Jan 2024 12:27)      OVERNIGHT EVENTS/INTERVAL/SUBJECTIVE: Episode of of AMS and aphasia on 1/28     CONSTITUTIONAL: No weakness. No fatigue. No fever.  HEAD: No head trauma.   EYES: No vision changes.  ENT: No hearing changes or tinnitus. No ear pain. No changes in smell. No nasal congestion or discharge. No sore throat. No voice hoarseness.   NECK: No neck pain or stiffness. No lumps.  RESPIRATORY: No cough. No SOB. No wheezing. No hemoptysis.   CARDIOVASCULAR: No chest pain. No palpitations.   GASTROINTESTINAL: No dysphagia. No ABD pain. No distension. No constipation. No diarrhea. No pain with defecation. No hematemesis. No hematochezia or melena.  BACK: No back pain.  GENITOURINARY: No dysuria. No frequency or urgency. No hesitancy. No incontinence. No urinary retention. No suprapubic pain. No hematuria.  EXTREMITY: No swelling.  MUSCULOSKELETAL: No joint pain or swelling. No fractures. No stiffness.    SKIN: No rashes. No itching. No skin, hair, or nail changes.  NEUROLOGICAL: +HA. No weakness or paralysis. No lightheadedness or dizziness. No HA. No numbness or tingling.   PSYCHIATRIC: No depression.       Vital Signs Last 24 Hrs  T(C): 36.3 (29 Jan 2024 05:45), Max: 36.8 (28 Jan 2024 10:55)  T(F): 97.3 (29 Jan 2024 05:45), Max: 98.3 (28 Jan 2024 19:45)  HR: 68 (29 Jan 2024 05:45) (68 - 82)  BP: 138/81 (29 Jan 2024 05:45) (135/84 - 173/99)  BP(mean): --  RR: 18 (29 Jan 2024 05:45) (17 - 18)  SpO2: 99% (29 Jan 2024 05:45) (96% - 100%)    Parameters below as of 29 Jan 2024 05:45  Patient On (Oxygen Delivery Method): room air        I&O's Summary    MEDICATIONS  (STANDING):  chlorhexidine 2% Cloths 1 Application(s) Topical daily  enoxaparin Injectable 40 milliGRAM(s) SubCutaneous every 24 hours  ketorolac   Injectable 15 milliGRAM(s) IV Push once  levETIRAcetam  IVPB 1000 milliGRAM(s) IV Intermittent every 12 hours  valsartan 160 milliGRAM(s) Oral daily    MEDICATIONS  (PRN):  acetaminophen     Tablet .. 650 milliGRAM(s) Oral every 6 hours PRN Temp greater or equal to 38C (100.4F), Mild Pain (1 - 3)  labetalol Injectable 10 milliGRAM(s) IV Push every 6 hours PRN SBP >170  LORazepam   Injectable 1 milliGRAM(s) IV Push once PRN > 3minutes of general tonic clonic seizure activity  melatonin 3 milliGRAM(s) Oral at bedtime PRN Insomnia        CONSTITUTIONAL: No acute distress. Somnolent, intermittently arousable.  RESPIRATORY: CTAB. No wheezes, rales, or rhonchi. No accessory muscle use. No apparent respiratory distress.  CARDIOVASCULAR: +S1/S2. No audible S3/S4. Regular rate and rhythm. No murmurs, rubs, or gallops. No LE swelling or edema.  GASTROINTESTINAL: Soft, nontender, nondistended. +BS. No rebound or guarding.   MUSCULOSKELETAL: Very sparse movements.  NEUROLOGICAL: Limited assessment due to mental status.  PSYCHIATRIC: Somnolent affect. A&Ox3.      LABS                                           13.6   5.26  )-----------( 308      ( 29 Jan 2024 04:46 )             41.1       01-29    139  |  106  |  12  ----------------------------<  63<L>  4.3   |  22  |  0.69    Ca    9.4      29 Jan 2024 04:46  Phos  3.6     01-29  Mg     2.20     01-29      CAPILLARY BLOOD GLUCOSE              Urinalysis Basic - ( 28 Jan 2024 05:18 )    Color: x / Appearance: x / SG: x / pH: x  Gluc: 88 mg/dL / Ketone: x  / Bili: x / Urobili: x   Blood: x / Protein: x / Nitrite: x   Leuk Esterase: x / RBC: x / WBC x   Sq Epi: x / Non Sq Epi: x / Bacteria: x            RADIOLOGY AND ADDITIONAL TESTS:    CONSULTANT NOTES REVIEWED:    CARE DISCUSSED WITH THE FOLLOWING CONSULTANTS/PROVIDERS: [facility]  Hospitalist  Pager- [phone]      LIZZIE RAHMAN, MRN-[id number]    Patient is a 53y old  Female who presents with a chief complaint of seizure (28 Jan 2024 12:27)      OVERNIGHT EVENTS/INTERVAL/SUBJECTIVE: Episode of of AMS and aphasia on 1/28   Asp er pt, she has been having multiple episodes of seizures  since Friday   Denies recent infections, change in medication or stress    CONSTITUTIONAL: No weakness. No fatigue. No fever.  HEAD: No head trauma.   EYES: No vision changes.  ENT: No hearing changes or tinnitus. No ear pain. No changes in smell. No nasal congestion or discharge. No sore throat. No voice hoarseness.   NECK: No neck pain or stiffness. No lumps.  RESPIRATORY: No cough. No SOB. No wheezing. No hemoptysis.   CARDIOVASCULAR: No chest pain. No palpitations.   GASTROINTESTINAL: No dysphagia. No ABD pain. No distension. No constipation. No diarrhea. No pain with defecation. No hematemesis. No hematochezia or melena.  BACK: No back pain.  GENITOURINARY: No dysuria. No frequency or urgency. No hesitancy. No incontinence. No urinary retention. No suprapubic pain. No hematuria.  EXTREMITY: No swelling.  MUSCULOSKELETAL: No joint pain or swelling. No fractures. No stiffness.    SKIN: No rashes. No itching. No skin, hair, or nail changes.  NEUROLOGICAL: +HA. No weakness or paralysis. No lightheadedness or dizziness. No HA. No numbness or tingling.   PSYCHIATRIC: No depression.       Vital Signs Last 24 Hrs  T(C): 36.3 (29 Jan 2024 05:45), Max: 36.8 (28 Jan 2024 10:55)  T(F): 97.3 (29 Jan 2024 05:45), Max: 98.3 (28 Jan 2024 19:45)  HR: 68 (29 Jan 2024 05:45) (68 - 82)  BP: 138/81 (29 Jan 2024 05:45) (135/84 - 173/99)  BP(mean): --  RR: 18 (29 Jan 2024 05:45) (17 - 18)  SpO2: 99% (29 Jan 2024 05:45) (96% - 100%)    Parameters below as of 29 Jan 2024 05:45  Patient On (Oxygen Delivery Method): room air        I&O's Summary    MEDICATIONS  (STANDING):  chlorhexidine 2% Cloths 1 Application(s) Topical daily  enoxaparin Injectable 40 milliGRAM(s) SubCutaneous every 24 hours  ketorolac   Injectable 15 milliGRAM(s) IV Push once  levETIRAcetam  IVPB 1000 milliGRAM(s) IV Intermittent every 12 hours  valsartan 160 milliGRAM(s) Oral daily    MEDICATIONS  (PRN):  acetaminophen     Tablet .. 650 milliGRAM(s) Oral every 6 hours PRN Temp greater or equal to 38C (100.4F), Mild Pain (1 - 3)  labetalol Injectable 10 milliGRAM(s) IV Push every 6 hours PRN SBP >170  LORazepam   Injectable 1 milliGRAM(s) IV Push once PRN > 3minutes of general tonic clonic seizure activity  melatonin 3 milliGRAM(s) Oral at bedtime PRN Insomnia        CONSTITUTIONAL: No acute distress. Somnolent, intermittently arousable.  RESPIRATORY: CTAB. No wheezes, rales, or rhonchi. No accessory muscle use. No apparent respiratory distress.  CARDIOVASCULAR: +S1/S2. No audible S3/S4. Regular rate and rhythm. No murmurs, rubs, or gallops. No LE swelling or edema.  GASTROINTESTINAL: Soft, nontender, nondistended. +BS. No rebound or guarding.   MUSCULOSKELETAL: Very sparse movements.  NEUROLOGICAL: Limited assessment due to mental status.  PSYCHIATRIC: Somnolent affect. A&Ox3.      LABS                                           13.6   5.26  )-----------( 308      ( 29 Jan 2024 04:46 )             41.1       01-29    139  |  106  |  12  ----------------------------<  63<L>  4.3   |  22  |  0.69    Ca    9.4      29 Jan 2024 04:46  Phos  3.6     01-29  Mg     2.20     01-29      CAPILLARY BLOOD GLUCOSE              Urinalysis Basic - ( 28 Jan 2024 05:18 )    Color: x / Appearance: x / SG: x / pH: x  Gluc: 88 mg/dL / Ketone: x  / Bili: x / Urobili: x   Blood: x / Protein: x / Nitrite: x   Leuk Esterase: x / RBC: x / WBC x   Sq Epi: x / Non Sq Epi: x / Bacteria: x            RADIOLOGY AND ADDITIONAL TESTS:    CONSULTANT NOTES REVIEWED:    CARE DISCUSSED WITH THE FOLLOWING CONSULTANTS/PROVIDERS:

## 2024-01-29 NOTE — PROVIDER CONTACT NOTE (OTHER) - RECOMMENDATIONS
provider notified

## 2024-01-29 NOTE — PROVIDER CONTACT NOTE (OTHER) - DATE AND TIME:
29-Jan-2024 10:12
29-Jan-2024 14:06
28-Jan-2024 14:56
28-Jan-2024 10:08
28-Jan-2024 19:06
29-Jan-2024 17:53

## 2024-01-29 NOTE — PROVIDER CONTACT NOTE (OTHER) - SITUATION
PT had seizure like acitivty at 9:53 am PA rashida moseley and neuro at bedside. PT obtunded with left gaze deviation /113 pulse 78 O2 98 on room air
PT has seizure like activity at 9:43am just staring with tears coming down. slight tracking with eyes pupils responsive
PT having seizure like activity at 1858 . obtunded. not following commands. /102 Pulse 86/ O@ 98 on room air
Seizure activity at 1735 muscle tension. arched back, bent legs. left gaze deviation /120 pulse 93 O2 98
seizure like acitivty at 1349. PT obtunded no convulsions. left gaze deviation. /26 Pulse 82 O2 100 on room air
seizure like activity at 14:40 PT obtunded.

## 2024-01-29 NOTE — PROGRESS NOTE ADULT - PROBLEM SELECTOR PLAN 2
- Home medication: amlodipine-valsartan   - Hypertense in the ED - likely transiently from seizure? vs hypertensive emergency  - C/w valsartan 160, c/w labetalol PRN  - If persistently hypertensive, can add back amlodipine Home medication: amlodipine-valsartan   Hypertense in the ED - likely transiently from seizure? vs hypertensive emergency  SBP increased to 186/113 during the episode, decreased on its own   C/w valsartan 160, c/w labetalol PRN  If persistently hypertensive, can add back amlodipine

## 2024-01-29 NOTE — PROGRESS NOTE ADULT - PROBLEM SELECTOR PLAN 4
DVT ppx: Lovenox  Disposition, pending clinical course DVT ppx: Lovenox  Disposition, pending clinical course, neuro clearance

## 2024-01-29 NOTE — PROGRESS NOTE ADULT - PROBLEM SELECTOR PLAN 1
Episode at work, followed by recurrent episodes of seizure like activity in the ED s/p ativan 4mg x1 for recurrent seizure    CT head/perfusion as above: No evidence of significant stenosis, occlusion or dissection bilateral extracranial carotid arteries. No evidence of large vessel occlusion, definitive aneurysm or vascular   malformation intracranial circulation.   Appreciate Neurology recs -   Multiple episodes of convulsions with c/f seizures, also noted to have elevated BPs to 200s outside, in ED has been 180s-190s. Etiology of new onset seizure-like activity is unknown and patient will benefit from further workup to r/o treatable etiologies.    Recommendations:  [] Neurology working with primary team to transfer patient to [facility] EMU for quicker re-connection to vEEG. However, we have been informed this PM that no beds are available until approximately midnight 1/29/2024 - patient will not be set up on vEEG until tomorrow at [facility] (if transferred). Would recommend monitoring clinically at [facility] and set up on vEEG tomorrow (1/29) at [facility] if unable to get transferred to [facility] in a timely manner. D/w family at bedside - they are agreeable with plan but express they would like to transfer to [facility] if possible  [] Continue LEV 1000mg IVPB BID until reconnected to vEEG  [] For GTC activity >3 minutes with vital sign changes - can give lorazepam 1mg IVP, if refractory to lorazepam - can give LEV 1000mg IVP  [] MRI brain w/wo (epilepsy protocol)  [] Seizure/fall/aspiration precautions  c/w Keppra 1g BID, pending vEEG/MRI with epilepsy protocol  Passed bedside dysphagia screen - on regular diet  Given toradol 15 IV x 1 and magnesium 1000 x 1 for headache Episode at work, followed by recurrent episodes of seizure like activity in the ED s/p ativan 4mg x1 for recurrent seizure   CT head/perfusion as above: No evidence of significant stenosis, occlusion or dissection bilateral extracranial carotid arteries. No evidence of large vessel occlusion, definitive aneurysm or vascular   malformation intracranial circulation.  Appreciate Neurology recs -   Multiple episodes of convulsions with c/f seizures, also noted to have elevated BPs to 200s outside, in ED has been 180s-190s. Etiology of new onset seizure-like activity is unknown and patient will benefit from further workup to r/o treatable etiologies.  s/p Keppra load followed by keppra 1gm BID  Prelim EEG shows no epileptiform abnormalities or electrographic seizure.  MRI brain w/wo (epilepsy protocol)  Passed bedside dysphagia screen - on regular diet  Given toradol 15 IV x 1 and magnesium 1000 x 1 for headache  Appreciate Neuro recs- Advise to dc keppra as no seizures noted on EEG . monitor on VEEG for now

## 2024-01-29 NOTE — PROVIDER CONTACT NOTE (OTHER) - REASON
PT had seizure like acitivty at 9:53 am PA rashida moseley and neuro at bedside. PT obtunded with left gaze deviation /113 pulse 78 O2 98 on room air
PT having seizure like activity at 1858 . obtunded. not following commands. /102 Pulse 86/ O@ 98 on room air
seizure like activity at 14:40 PT obtunded.
Seizure activity at 1735 muscle tension. arched back, bent legs. left gaze deviation /120 pulse 93 O2 98
seizure like acitivty at 1349. PT obtunded no convulsions. left gaze deviation. /26 Pulse 82 O2 100 on room air
PT has seizure like activity at 9:43am just staring with tears coming down. slight tracking with eyes pupils responsive

## 2024-01-30 ENCOUNTER — TRANSCRIPTION ENCOUNTER (OUTPATIENT)
Age: 54
End: 2024-01-30

## 2024-01-30 LAB
ANION GAP SERPL CALC-SCNC: 13 MMOL/L — SIGNIFICANT CHANGE UP (ref 7–14)
BUN SERPL-MCNC: 14 MG/DL — SIGNIFICANT CHANGE UP (ref 7–23)
CALCIUM SERPL-MCNC: 9.2 MG/DL — SIGNIFICANT CHANGE UP (ref 8.4–10.5)
CHLORIDE SERPL-SCNC: 107 MMOL/L — SIGNIFICANT CHANGE UP (ref 98–107)
CO2 SERPL-SCNC: 19 MMOL/L — LOW (ref 22–31)
CREAT SERPL-MCNC: 0.74 MG/DL — SIGNIFICANT CHANGE UP (ref 0.5–1.3)
EGFR: 97 ML/MIN/1.73M2 — SIGNIFICANT CHANGE UP
GLUCOSE SERPL-MCNC: 76 MG/DL — SIGNIFICANT CHANGE UP (ref 70–99)
HCT VFR BLD CALC: 41.2 % — SIGNIFICANT CHANGE UP (ref 34.5–45)
HGB BLD-MCNC: 13.6 G/DL — SIGNIFICANT CHANGE UP (ref 11.5–15.5)
MAGNESIUM SERPL-MCNC: 2.3 MG/DL — SIGNIFICANT CHANGE UP (ref 1.6–2.6)
MCHC RBC-ENTMCNC: 27.7 PG — SIGNIFICANT CHANGE UP (ref 27–34)
MCHC RBC-ENTMCNC: 33 GM/DL — SIGNIFICANT CHANGE UP (ref 32–36)
MCV RBC AUTO: 83.9 FL — SIGNIFICANT CHANGE UP (ref 80–100)
NRBC # BLD: 0 /100 WBCS — SIGNIFICANT CHANGE UP (ref 0–0)
NRBC # FLD: 0 K/UL — SIGNIFICANT CHANGE UP (ref 0–0)
PHOSPHATE SERPL-MCNC: 4.4 MG/DL — SIGNIFICANT CHANGE UP (ref 2.5–4.5)
PLATELET # BLD AUTO: 301 K/UL — SIGNIFICANT CHANGE UP (ref 150–400)
POTASSIUM SERPL-MCNC: SIGNIFICANT CHANGE UP MMOL/L (ref 3.5–5.3)
POTASSIUM SERPL-SCNC: SIGNIFICANT CHANGE UP MMOL/L (ref 3.5–5.3)
RBC # BLD: 4.91 M/UL — SIGNIFICANT CHANGE UP (ref 3.8–5.2)
RBC # FLD: 13.4 % — SIGNIFICANT CHANGE UP (ref 10.3–14.5)
SODIUM SERPL-SCNC: 139 MMOL/L — SIGNIFICANT CHANGE UP (ref 135–145)
WBC # BLD: 5.74 K/UL — SIGNIFICANT CHANGE UP (ref 3.8–10.5)
WBC # FLD AUTO: 5.74 K/UL — SIGNIFICANT CHANGE UP (ref 3.8–10.5)

## 2024-01-30 PROCEDURE — 95718 EEG PHYS/QHP 2-12 HR W/VEEG: CPT

## 2024-01-30 PROCEDURE — 90792 PSYCH DIAG EVAL W/MED SRVCS: CPT

## 2024-01-30 PROCEDURE — 99232 SBSQ HOSP IP/OBS MODERATE 35: CPT

## 2024-01-30 RX ORDER — ACETAMINOPHEN 500 MG
1000 TABLET ORAL ONCE
Refills: 0 | Status: COMPLETED | OUTPATIENT
Start: 2024-01-30 | End: 2024-01-30

## 2024-01-30 RX ADMIN — CHLORHEXIDINE GLUCONATE 1 APPLICATION(S): 213 SOLUTION TOPICAL at 11:06

## 2024-01-30 RX ADMIN — VALSARTAN 160 MILLIGRAM(S): 80 TABLET ORAL at 07:40

## 2024-01-30 RX ADMIN — ENOXAPARIN SODIUM 40 MILLIGRAM(S): 100 INJECTION SUBCUTANEOUS at 18:01

## 2024-01-30 RX ADMIN — Medication 400 MILLIGRAM(S): at 13:11

## 2024-01-30 NOTE — BH CONSULTATION LIAISON ASSESSMENT NOTE - MSE UNSTRUCTURED FT
Mental Status Exam:  Kirstin: well groomed, fair hygiene     Behavior: calm, cooperative, no psychomotor retardation/agitation  Motor: no tremors, EPS, or rigidity  Gait: did not assess, pt in bed  Speech: normal rate, rhythm, prosedy and volume   Mood: "okay"  Affect: euthymic, congruent  Thought process: clear, goal directed   Thought Content: denies paranoia, delusions   Perception: denies AH/VH  SI: denies  HI: denies  Insight: fair  Judgment: fair    Cognitive Exam:  Orientation: AOx3  Recall: intact  Attention: intact  Abstraction: intact

## 2024-01-30 NOTE — BH CONSULTATION LIAISON ASSESSMENT NOTE - CURRENT MEDICATION
MEDICATIONS  (STANDING):  chlorhexidine 2% Cloths 1 Application(s) Topical daily  enoxaparin Injectable 40 milliGRAM(s) SubCutaneous every 24 hours  valsartan 160 milliGRAM(s) Oral daily    MEDICATIONS  (PRN):  acetaminophen     Tablet .. 650 milliGRAM(s) Oral every 6 hours PRN Temp greater or equal to 38C (100.4F), Mild Pain (1 - 3)  labetalol Injectable 10 milliGRAM(s) IV Push every 6 hours PRN SBP >170  LORazepam   Injectable 1 milliGRAM(s) IV Push once PRN > 3minutes of general tonic clonic seizure activity  melatonin 3 milliGRAM(s) Oral at bedtime PRN Insomnia

## 2024-01-30 NOTE — PROGRESS NOTE ADULT - PROBLEM SELECTOR PLAN 2
Home medication: amlodipine-valsartan   Hypertense in the ED - likely transiently from seizure? vs hypertensive emergency  SBP increased to 186/113 during the episode, decreased on its own. SBP in 140s  C/w valsartan 160, c/w labetalol PRN  If persistently hypertensive, can add back amlodipine    #Abnormal UA- Large LE. [facility] Home medication: amlodipine-valsartan   Hypertense in the ED - likely transiently from seizure? vs hypertensive emergency  SBP increased to 186/113 during the episode, decreased on its own. SBP in 140s  C/w valsartan 160, c/w labetalol PRN  If persistently hypertensive, can add back amlodipine    #Abnormal UA- pt asymptomatic. EndorLarge [facility]

## 2024-01-30 NOTE — DISCHARGE NOTE PROVIDER - HOSPITAL COURSE
53-year-old with past medical history of hypertension, prediabetes, vitamin D deficiency, ?per outpatient records hyperlipidemia ( (LDL 93 11/2022) presenting as a code stroke for AMS + aphasia s/p seizures. Per history from EMS, was working at [employer] when was witnessed by staff to have generalized convulsions lasting roughly 2 minutes, at the time BP reportedly in 240s. Upon arrival of EMS, witnessed to have another episode 30 seconds of convulsions no incontinence noted, . On presentation, /116, noted to have another episode of convulsions, head turned briefly to left lasting less than 30 seconds in duration by ED + neuro team. NIHSS: 26 -> 15 upon re-evaluation following imaging  Upon re-evaluation at bedside after imaging patient able to follow simple commands raise UE antigravity symmetrically antigravity for several seconds.  No recent infections/illnesses, recent injuries, recent travel, recent reported changes in medications per spouse. pt was admitted to EMU to evaluate for seizures.    # Concern for seizures-Episode at work, followed by recurrent episodes of seizure like activity in the ED s/p ativan 4mg x1 for recurrent seizure   CT head/perfusion as above: No evidence of significant stenosis, occlusion or dissection bilateral extracranial carotid arteries. No evidence of large vessel occlusion, definitive aneurysm or vascular malformation intracranial circulation.  s/p Keppra load followed by keppra 1gm BID  EEG shows no epileptiform abnormalities or electrographic seizure.  Appreciate Neuro recs- Advise to dc keppra as no seizures noted on EEG. monitor on VEEG for now.  FU final neuro recs- regarding if need for MRI brain w/wo (epilepsy protocol).    # Hypertensive urgency-Home medication: amlodipine-valsartan . Hypertense in the ED - likely transiently from seizure? vs hypertensive emergency  SBP increased to 186/113 during the episode, decreased on its own. SBP in 140s. C/w valsartan 160, c/w labetalol PRN  If persistently hypertensive, can add back amlodipine    #Abnormal UA- pt asymptomatic. Large LE. FU UC.    # Multiple thyroid nodules-  Plan: Seen on CT. TSH 2.33. OP thyroid US.   53-year-old with past medical history of hypertension, prediabetes, vitamin D deficiency, ?per outpatient records hyperlipidemia ( (LDL 93 11/2022) presenting as a code stroke for AMS + aphasia s/p seizures. Per history from EMS, was working at [employer] when was witnessed by staff to have generalized convulsions lasting roughly 2 minutes, at the time BP reportedly in 240s. Upon arrival of EMS, witnessed to have another episode 30 seconds of convulsions no incontinence noted, . On presentation, /116, noted to have another episode of convulsions, head turned briefly to left lasting less than 30 seconds in duration by ED + neuro team. NIHSS: 26 -> 15 upon re-evaluation following imaging  Upon re-evaluation at bedside after imaging patient able to follow simple commands raise UE antigravity symmetrically antigravity for several seconds.  No recent infections/illnesses, recent injuries, recent travel, recent reported changes in medications per spouse. pt was admitted to EMU to evaluate for seizures.    # Concern for seizures- Episode at work, followed by recurrent episodes of seizure like activity in the ED s/p ativan 4mg x1 for recurrent seizure   CT head/perfusion as above: No evidence of significant stenosis, occlusion or dissection bilateral extracranial carotid arteries. No evidence of large vessel occlusion, definitive aneurysm or vascular malformation intracranial circulation. Appreciate Neurology recs -  Multiple episodes of convulsions with c/f seizures, also noted to have elevated BPs to 200s outside, in ED has been 180s-190s. Etiology of new onset seizure-like activity is unknown and patient will benefit from further workup to r/o treatable etiologies.  s/p Keppra load followed by keppra 1gm BID. EEG shows no epileptiform abnormalities or electrographic seizure. MRI- No acute infarct, hemorrhage, or mass.  No potential seizure focus identified. Chronic white matter small vessel changes.Appreciate Neuro recs- Advise to dc keppra as no seizures noted on EEG. As per neuro- no restriction to driving. Advise Psych eval. Pt was seen by Psych on 1/31-defer medications at this time, may benefit from counseling, resources as below. F/u with: [facility] [address] [facility], First Floor, [place] [address].      # Hypertensive urgency-Home medication: amlodipine-valsartan . Hypertense in the ED - likely transiently from seizure? vs hypertensive emergency  SBP increased to 186/113 during the episode, decreased on its own. SBP in 140s. C/w valsartan 160, c/w labetalol PRN  If persistently hypertensive, can add back amlodipine    #UTI- Large LE. UC- Ecoli. Started on Bactrim as pt with PCN allergy.    # Multiple thyroid nodules-  Plan: Seen on CT. TSH 2.33. OP thyroid US.
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
## 2024-01-30 NOTE — BH CONSULTATION LIAISON ASSESSMENT NOTE - NSBHCONSULTFOLLOWAFTERCARE_PSY_A_CORE FT
[facility]  [address] [place] [address], [facility], First Floor, [place] [address]  [phone]  [email or web address] - Central Intake: [phone]

## 2024-01-30 NOTE — DISCHARGE NOTE PROVIDER - NSDCCPCAREPLAN_GEN_ALL_CORE_FT
PRINCIPAL DISCHARGE DIAGNOSIS  Diagnosis: Seizure  Assessment and Plan of Treatment: you had an episode at work, followed by recurrent episodes of seizure like activity in the ED.  You received ativan  for recurrent seizure   Your CT head/perfusion showed no evidence of significant stenosis, occlusion or dissection bilateral extracranial carotid arteries. No evidence of large vessel occlusion, definitive aneurysm or vascular malformation intracranial circulation. You receievd a Keppra load followed by keppra 1gm BID. EEG showed no epileptiform abnormalities or electrographic seizure and Keppra was discontinued         SECONDARY DISCHARGE DIAGNOSES  Diagnosis: HTN (hypertension)  Assessment and Plan of Treatment: Your blood pressure was elevated, mostly when you were having shaking. Please take your meds as prescribed    Diagnosis: Multiple thyroid nodules  Assessment and Plan of Treatment: Multiple thyroid nodules were seen on CT. Your  TSH was WNL.  Please follow up with your PMD for thyroid US.

## 2024-01-30 NOTE — BH CONSULTATION LIAISON ASSESSMENT NOTE - NSBHCHARTREVIEWVS_PSY_A_CORE FT
Vital Signs Last 24 Hrs  T(C): 36.6 (30 Jan 2024 12:10), Max: 36.7 (29 Jan 2024 22:00)  T(F): 97.9 (30 Jan 2024 12:10), Max: 98 (29 Jan 2024 22:00)  HR: 67 (30 Jan 2024 12:10) (67 - 98)  BP: 129/87 (30 Jan 2024 12:10) (129/87 - 202/120)  BP(mean): --  RR: 18 (30 Jan 2024 12:10) (17 - 18)  SpO2: 98% (30 Jan 2024 12:10) (98% - 100%)    Parameters below as of 30 Jan 2024 12:10  Patient On (Oxygen Delivery Method): room air

## 2024-01-30 NOTE — DISCHARGE NOTE PROVIDER - NSDCFUADDAPPT_GEN_ALL_CORE_FT
F/u with: [facility]  [address], [facility], First Floor, [place] [address] [phone]

## 2024-01-30 NOTE — PROGRESS NOTE ADULT - SUBJECTIVE AND OBJECTIVE BOX
[facility]  Hospitalist  Pager- [phone]      LIZZIE RAHMAN, MRN-[id number]    Patient is a 53y old  Female who presents with a chief complaint of seizure (28 Jan 2024 12:27)      OVERNIGHT EVENTS/INTERVAL/SUBJECTIVE: Episode of of AMS and aphasia on 1/28   Asp er pt, she has been having multiple episodes of seizures  since Friday   Denies recent infections, change in medication or stress    CONSTITUTIONAL: No weakness. No fatigue. No fever.  HEAD: No head trauma.   EYES: No vision changes.  ENT: No hearing changes or tinnitus. No ear pain. No changes in smell. No nasal congestion or discharge. No sore throat. No voice hoarseness.   NECK: No neck pain or stiffness. No lumps.  RESPIRATORY: No cough. No SOB. No wheezing. No hemoptysis.   CARDIOVASCULAR: No chest pain. No palpitations.   GASTROINTESTINAL: No dysphagia. No ABD pain. No distension. No constipation. No diarrhea. No pain with defecation. No hematemesis. No hematochezia or melena.  BACK: No back pain.  GENITOURINARY: No dysuria. No frequency or urgency. No hesitancy. No incontinence. No urinary retention. No suprapubic pain. No hematuria.  EXTREMITY: No swelling.  MUSCULOSKELETAL: No joint pain or swelling. No fractures. No stiffness.    SKIN: No rashes. No itching. No skin, hair, or nail changes.  NEUROLOGICAL: +HA. No weakness or paralysis. No lightheadedness or dizziness. No HA. No numbness or tingling.   PSYCHIATRIC: No depression.       Vital Signs Last 24 Hrs  T(C): 36.7 (30 Jan 2024 07:30), Max: 36.7 (29 Jan 2024 10:00)  T(F): 98 (30 Jan 2024 07:30), Max: 98.1 (29 Jan 2024 10:00)  HR: 98 (30 Jan 2024 07:30) (68 - 98)  BP: 143/95 (30 Jan 2024 07:30) (130/80 - 202/120)  BP(mean): --  RR: 18 (30 Jan 2024 07:30) (17 - 18)  SpO2: 99% (30 Jan 2024 07:30) (98% - 100%)    Parameters below as of 30 Jan 2024 07:30  Patient On (Oxygen Delivery Method): room air      I&O's Summary    MEDICATIONS  (STANDING):  chlorhexidine 2% Cloths 1 Application(s) Topical daily  enoxaparin Injectable 40 milliGRAM(s) SubCutaneous every 24 hours  valsartan 160 milliGRAM(s) Oral daily    MEDICATIONS  (PRN):  acetaminophen     Tablet .. 650 milliGRAM(s) Oral every 6 hours PRN Temp greater or equal to 38C (100.4F), Mild Pain (1 - 3)  labetalol Injectable 10 milliGRAM(s) IV Push every 6 hours PRN SBP >170  LORazepam   Injectable 1 milliGRAM(s) IV Push once PRN > 3minutes of general tonic clonic seizure activity  melatonin 3 milliGRAM(s) Oral at bedtime PRN Insomnia      CONSTITUTIONAL: No acute distress. Somnolent, intermittently arousable.  RESPIRATORY: CTAB. No wheezes, rales, or rhonchi. No accessory muscle use. No apparent respiratory distress.  CARDIOVASCULAR: +S1/S2. No audible S3/S4. Regular rate and rhythm. No murmurs, rubs, or gallops. No LE swelling or edema.  GASTROINTESTINAL: Soft, nontender, nondistended. +BS. No rebound or guarding.   MUSCULOSKELETAL: Very sparse movements.  NEUROLOGICAL: Limited assessment due to mental status.  PSYCHIATRIC: Somnolent affect. A&Ox3.      LABS                                                    13.6   5.74  )-----------( 301      ( 30 Jan 2024 04:33 )             41.2         01-30    139  |  107  |  14  ----------------------------<  76  TNP   |  19<L>  |  0.74    Ca    9.2      30 Jan 2024 04:33  Phos  4.4     01-30  Mg     2.30     01-30        CAPILLARY BLOOD GLUCOSE              Urinalysis Basic - ( 28 Jan 2024 05:18 )    Color: x / Appearance: x / SG: x / pH: x  Gluc: 88 mg/dL / Ketone: x  / Bili: x / Urobili: x   Blood: x / Protein: x / Nitrite: x   Leuk Esterase: x / RBC: x / WBC x   Sq Epi: x / Non Sq Epi: x / Bacteria: x            RADIOLOGY AND ADDITIONAL TESTS:    CONSULTANT NOTES REVIEWED:    CARE DISCUSSED WITH THE FOLLOWING CONSULTANTS/PROVIDERS: [facility]  Hospitalist  Pager- [phone]      LIZZIE RAHMAN, MRN-[id number]    Patient is a 53y old  Female who presents with a chief complaint of seizure (28 Jan 2024 12:27)    OVERNIGHT EVENTS/INTERVAL/SUBJECTIVE:  Pt seen and examined by me.  at bedside  Pt asking if EEG can be dced.   Reviewed UA studies with pt- pt denies dysuria or burning. No fever or chills     CONSTITUTIONAL: No weakness. No fatigue. No fever.  HEAD: No head trauma.   EYES: No vision changes.  ENT: No hearing changes or tinnitus. No ear pain. No changes in smell. No nasal congestion or discharge. No sore throat. No voice hoarseness.   NECK: No neck pain or stiffness. No lumps.  RESPIRATORY: No cough. No SOB. No wheezing. No hemoptysis.   CARDIOVASCULAR: No chest pain. No palpitations.   GASTROINTESTINAL: No dysphagia. No ABD pain. No distension. No constipation. No diarrhea. No pain with defecation. No hematemesis. No hematochezia or melena.  BACK: No back pain.  GENITOURINARY: No dysuria. No frequency or urgency. No hesitancy. No incontinence. No urinary retention. No suprapubic pain. No hematuria.  EXTREMITY: No swelling.  MUSCULOSKELETAL: No joint pain or swelling. No fractures. No stiffness.    SKIN: No rashes. No itching. No skin, hair, or nail changes.  NEUROLOGICAL: No weakness or paralysis. No lightheadedness or dizziness. No HA. No numbness or tingling.   PSYCHIATRIC: No depression.       Vital Signs Last 24 Hrs  T(C): 36.7 (30 Jan 2024 07:30), Max: 36.7 (29 Jan 2024 10:00)  T(F): 98 (30 Jan 2024 07:30), Max: 98.1 (29 Jan 2024 10:00)  HR: 98 (30 Jan 2024 07:30) (68 - 98)  BP: 143/95 (30 Jan 2024 07:30) (130/80 - 202/120)  BP(mean): --  RR: 18 (30 Jan 2024 07:30) (17 - 18)  SpO2: 99% (30 Jan 2024 07:30) (98% - 100%)    Parameters below as of 30 Jan 2024 07:30  Patient On (Oxygen Delivery Method): room air      I&O's Summary    MEDICATIONS  (STANDING):  chlorhexidine 2% Cloths 1 Application(s) Topical daily  enoxaparin Injectable 40 milliGRAM(s) SubCutaneous every 24 hours  valsartan 160 milliGRAM(s) Oral daily    MEDICATIONS  (PRN):  acetaminophen     Tablet .. 650 milliGRAM(s) Oral every 6 hours PRN Temp greater or equal to 38C (100.4F), Mild Pain (1 - 3)  labetalol Injectable 10 milliGRAM(s) IV Push every 6 hours PRN SBP >170  LORazepam   Injectable 1 milliGRAM(s) IV Push once PRN > 3minutes of general tonic clonic seizure activity  melatonin 3 milliGRAM(s) Oral at bedtime PRN Insomnia      CONSTITUTIONAL: No acute distress. Somnolent, intermittently arousable.  RESPIRATORY: CTAB. No wheezes, rales, or rhonchi. No accessory muscle use. No apparent respiratory distress.  CARDIOVASCULAR: +S1/S2. No audible S3/S4. Regular rate and rhythm. No murmurs, rubs, or gallops. No LE swelling or edema.  GASTROINTESTINAL: Soft, nontender, nondistended. +BS. No rebound or guarding.   MUSCULOSKELETAL: Very sparse movements.  NEUROLOGICAL: Limited assessment due to mental status.  PSYCHIATRIC: Somnolent affect. A&Ox3.      LABS                                                    13.6   5.74  )-----------( 301      ( 30 Jan 2024 04:33 )             41.2         01-30    139  |  107  |  14  ----------------------------<  76  TNP   |  19<L>  |  0.74    Ca    9.2      30 Jan 2024 04:33  Phos  4.4     01-30  Mg     2.30     01-30        CAPILLARY BLOOD GLUCOSE              Urinalysis Basic - ( 28 Jan 2024 05:18 )    Color: x / Appearance: x / SG: x / pH: x  Gluc: 88 mg/dL / Ketone: x  / Bili: x / Urobili: x   Blood: x / Protein: x / Nitrite: x   Leuk Esterase: x / RBC: x / WBC x   Sq Epi: x / Non Sq Epi: x / Bacteria: x            RADIOLOGY AND ADDITIONAL TESTS:    CONSULTANT NOTES REVIEWED: Y    CARE DISCUSSED WITH THE FOLLOWING CONSULTANTS/PROVIDERS:

## 2024-01-30 NOTE — PROGRESS NOTE ADULT - PROBLEM SELECTOR PLAN 1
Episode at work, followed by recurrent episodes of seizure like activity in the ED s/p ativan 4mg x1 for recurrent seizure   CT head/perfusion as above: No evidence of significant stenosis, occlusion or dissection bilateral extracranial carotid arteries. No evidence of large vessel occlusion, definitive aneurysm or vascular   malformation intracranial circulation.  Appreciate Neurology recs -  Multiple episodes of convulsions with c/f seizures, also noted to have elevated BPs to 200s outside, in ED has been 180s-190s. Etiology of new onset seizure-like activity is unknown and patient will benefit from further workup to r/o treatable etiologies.  s/p Keppra load followed by keppra 1gm BID  Prelim EEG shows no epileptiform abnormalities or electrographic seizure.  MRI brain w/wo (epilepsy protocol)  Passed bedside dysphagia screen - on regular diet  Given toradol 15 IV x 1 and magnesium 1000 x 1 for headache  Appreciate Neuro recs- Advise to dc keppra as no seizures noted on EEG. monitor on VEEG for now. Episode at work, followed by recurrent episodes of seizure like activity in the ED s/p ativan 4mg x1 for recurrent seizure   CT head/perfusion as above: No evidence of significant stenosis, occlusion or dissection bilateral extracranial carotid arteries. No evidence of large vessel occlusion, definitive aneurysm or vascular   malformation intracranial circulation.  Appreciate Neurology recs -  Multiple episodes of convulsions with c/f seizures, also noted to have elevated BPs to 200s outside, in ED has been 180s-190s. Etiology of new onset seizure-like activity is unknown and patient will benefit from further workup to r/o treatable etiologies.  s/p Keppra load followed by keppra 1gm BID  EEG shows no epileptiform abnormalities or electrographic seizure.  Appreciate Neuro recs- Advise to dc keppra as no seizures noted on EEG. monitor on VEEG for now.  FU final neuro recs- regarding if need for MRI brain w/wo (epilepsy protocol)

## 2024-01-30 NOTE — DISCHARGE NOTE PROVIDER - NSDCFUSCHEDAPPT_GEN_ALL_CORE_FT
[facility]  INTMED [address] [facility]  Scheduled Appointment: 02/07/2024

## 2024-01-30 NOTE — DISCHARGE NOTE PROVIDER - NSDCMRMEDTOKEN_GEN_ALL_CORE_FT
amlodipine-valsartan 5 mg-160 mg oral tablet: 1 tab(s) orally once a day   amlodipine-valsartan 5 mg-160 mg oral tablet: 1 tab(s) orally once a day  sulfamethoxazole-trimethoprim 800 mg-160 mg oral tablet: 1 tab(s) orally 2 times a day  valsartan 160 mg oral tablet: 1 tab(s) orally once a day   sulfamethoxazole-trimethoprim 800 mg-160 mg oral tablet: 1 tab(s) orally 2 times a day  valsartan 160 mg oral tablet: 1 tab(s) orally once a day

## 2024-01-30 NOTE — CHART NOTE - NSCHARTNOTEFT_GEN_A_CORE
Per discussion with Neurology in AM, MRI brain no longer needed therefore order was discontinued. Family then wanted MRI done inpatient and after their discussion with Neurology, plan is now for MRI to be done inpt prior to discharge. MRI reordered. Spoke with MRI tech to expedite.

## 2024-01-30 NOTE — CHART NOTE - NSCHARTNOTEFT_GEN_A_CORE
NEUROLOGY FOLLOW-UP:    EEG reviewed during rounds. Pertinent results as below:    "EEG Classification / Summary:  Normal video-EEG in the awake, drowsy, and asleep states.   -Multiple events (spasmodic truncal flexion; lying still not responding; or intermittent irregular flailing/shaking of extremities, intermittent pelvic thrusting, and lying still with head turned to left and eyes closed for a prolonged period afterward)  -No focal or epileptiform abnormalities are captured.     Clinical Impression:  Normal video-EEG.  The captured events are likely non-epileptic in nature."      IMPRESSION: Multiple episodes of truncal flexion/extension, unresponsiveness and shaking of extremities 2/2 psychogenic non-epileptic seizures. Low suspicion for epileptic etiology after EEG monitoring and reviewing captured events.     RECOMMENDATIONS:  [] No further inpatient neurological work-up indicated at this time; MRI is not indicated.   [] DC EEG  [] Please consult behavioral health  [] No indication for anti-seizure medications  [] Neurology signing off at this time; please call [phone] w/ any questions.   [] Rest of care per primary team    Michelle Garcia   PGY-3  Department of Neurology  [facility] NEUROLOGY FOLLOW-UP:    EEG reviewed during rounds. Pertinent results as below:    "EEG Classification / Summary:  Normal video-EEG in the awake, drowsy, and asleep states.   -Multiple events (spasmodic truncal flexion; lying still not responding; or intermittent irregular flailing/shaking of extremities, intermittent pelvic thrusting, and lying still with head turned to left and eyes closed for a prolonged period afterward)  -No focal or epileptiform abnormalities are captured.     Clinical Impression:  Normal video-EEG.  The captured events are likely non-epileptic in nature."      IMPRESSION: Multiple episodes of truncal flexion/extension, unresponsiveness and shaking of extremities 2/2 psychogenic non-epileptic seizures. Low suspicion for epileptic etiology after EEG monitoring and reviewing captured events.     RECOMMENDATIONS:  [] No further inpatient neurological work-up indicated at this time; MRI is not indicated.   [] DC EEG  [] Please consult behavioral health  [] No indication for anti-seizure medications  [] Neurology signing off at this time; please call [phone] w/ any questions.   [] Rest of care per primary team    Michelle Garcia   PGY-3  Department of Neurology  [facility]  Thank you
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
## 2024-01-30 NOTE — DISCHARGE NOTE PROVIDER - NSDCCPTREATMENT_GEN_ALL_CORE_FT
PRINCIPAL PROCEDURE  Procedure: MR brain  Findings and Treatment: Your MRI brain revealed:   No acute infarct, hemorrhage, or mass.  No potential seizure focus identified.  Chronic white matter small vessel changes.

## 2024-01-30 NOTE — EEG REPORT - NS EEG TEXT BOX
LIZZIE RAHMAN [id number]     Study Date: 1/29/24 08:55 - 1/30/24 08:00  Duration: 22 hr 52 min  --------------------------------------------------------------------------------------------------  History:  CC/ HPI Patient is a 53y old  Female who presents with a chief complaint of seizure (30 Jan 2024 09:46)    MEDICATIONS  (STANDING):  chlorhexidine 2% Cloths 1 Application(s) Topical daily  enoxaparin Injectable 40 milliGRAM(s) SubCutaneous every 24 hours  valsartan 160 milliGRAM(s) Oral daily    --------------------------------------------------------------------------------------------------  Study Interpretation:    [[[Abbreviation Key:  PDR=alpha rhythm/posterior dominant rhythm. A-P=anterior posterior.  Amplitude: ‘very low’:<20; ‘low’:20-49; ‘medium’:; ‘high’:>150uV.  Persistence for periodic/rhythmic patterns (% of epoch) ‘rare’:<1%; ‘occasional’:1-10%; ‘frequent’:10-50%; ‘abundant’:50-90%; ‘continuous’:>90%.  Persistence for sporadic discharges: ‘rare’:<1/hr; ‘occasional’:1/min-1/hr; ‘frequent’:>1/min; ‘abundant’:>1/10 sec.  RPP=rhythmic and periodic patterns; GRDA=generalized rhythmic delta activity; FIRDA=frontal intermittent GRDA; LRDA=lateralized rhythmic delta activity; TIRDA=temporal intermittent rhythmic delta activity;  LPD=PLED=lateralized periodic discharges; GPD=generalized periodic discharges; BIPDs =bilateral independent periodic discharges; Mf=multifocal; SIRPDs=stimulus induced rhythmic, periodic, or ictal appearing discharges; BIRDs=brief potentially ictal rhythmic discharges >4 Hz, lasting .5-10s; PFA (paroxysmal bursts >13 Hz or =8 Hz <10s).  Modifiers: +F=with fast component; +S=with spike component; +R=with rhythmic component.  S-B=burst suppression pattern.  Max=maximal. N1-drowsy; N2-stage II sleep; N3-slow wave sleep. SSS/BETS=small sharp spikes/benign epileptiform transients of sleep. HV=hyperventilation; PS=photic stimulation]]]    Daily EEG Visual Analysis    FINDINGS:      Background:  Continuity: Continuous  Symmetry: Symmetric  Posterior dominant rhythm (PDR): Occasional, 9 Hz, reactive to eye closure. Symmetric low-amplitude frontal beta in wakefulness.  State Change: Present  Voltage: Normal  Anterior-Posterior Gradient: Present  Other background findings: Predominant awake background consists of diffuse low-amplitude beta activity. Occasional diffuse theta.  Breach: Absent    Background Slowing:  Generalized slowing: None  Focal slowing: None    State Changes:   Drowsiness is characterized by fragmentation, attenuation, and slowing of the background activity.  Stage 2 sleep is characterized by symmetric K complexes and sleep spindles.     Interictal Findings:  None    Electrographic and Electroclinical seizures:  None    Other Clinical Events:  09:54:  Clinically: On video, others are in the room with patient, patient has eyes open and is looking around. Then patient has eyes closed, head initially slightly turned to R, then to L, intermittent low-amplitude slightly spasmodic truncal flexion/extension movements while reclining in bed. When provider opens patient's eyes, there is leftward gaze followed by midline and rightward gaze. Patient has antigravity effort in BUE when instructed during exam.  Neurology team reported eyes closed, leftward gaze, BLE shaking, and truncal flexion/extension movements. Limited evaluation of BLE on video due to camera angle and bed sheet.  EEG shows no epileptiform abnormalities or electrographic seizure.    13:46:  Patient motions to a visitor, takes her glasses off, points to her head, and visitor changes bed position from reclining to lying down. Patient moves all extremities spontaneously, then slowly turns head to left, eyes closed, lying still in bed. Staff comes to bedside. Patient not responding to staff. EEG shows awake background, no epileptiform abnormalities.    17:34:  Patient is reclining in bed with eyes closed, then tilts her head back with some truncal extension, then has irregular flailing/shaking of all extremities and pelvic thrusting, starting and stopping. Then has low-amplitude truncal and BUE shaking, somewhat laterally, while sliding down in the bed. Then turns head to left, eyes still closed. Intermittently sliding upward and downward in bed using her legs.  EEG is intermittently obscured by movement and muscle artifact. Outside of artifact, there is awake background, occasional drowsiness, and no epileptiform abnormalities.    Activation Procedures:   Hyperventilation is not performed.    Photic stimulation is not performed.    Artifacts:  Intermittent myogenic and movement artifacts are present.    EKG:  Single-lead EKG shows regular rhythm.    EEG Classification / Summary:  Normal video-EEG in the awake, drowsy, and asleep states.   -Multiple events (spasmodic truncal flexion; lying still not responding; or intermittent irregular flailing/shaking of extremities, intermittent pelvic thrusting, and lying still with head turned to left and eyes closed for a prolonged period afterward)  -No focal or epileptiform abnormalities are captured.     Clinical Impression:  Normal video-EEG.  The captured events are likely non-epileptic in nature.          -------------------------------------------------------------------------------------------------------    Lizzie Post MD  Attending Physician, [facility]    -------------------------------------------------------------------------------------------------------    To reach EEG technologist:  Please use the pager number for the appropriate hospital or contact the .  At  - Pager #: [phone]    To reach EEG-reading physician:   EEG Reading Room Phone #: [phone]  Epilepsy Answering Service after 5PM and before 8:30AM: Phone #: [phone]     LIZZIE RAHMAN [id number]     Study Date: 1/29/24 08:55 - 1/30/24 13:27  Duration: 28 hr 9 min  --------------------------------------------------------------------------------------------------  History:  CC/ HPI Patient is a 53y old  Female who presents with a chief complaint of seizure (30 Jan 2024 09:46)    MEDICATIONS  (STANDING):  chlorhexidine 2% Cloths 1 Application(s) Topical daily  enoxaparin Injectable 40 milliGRAM(s) SubCutaneous every 24 hours  valsartan 160 milliGRAM(s) Oral daily    --------------------------------------------------------------------------------------------------  Study Interpretation:    [[[Abbreviation Key:  PDR=alpha rhythm/posterior dominant rhythm. A-P=anterior posterior.  Amplitude: ‘very low’:<20; ‘low’:20-49; ‘medium’:; ‘high’:>150uV.  Persistence for periodic/rhythmic patterns (% of epoch) ‘rare’:<1%; ‘occasional’:1-10%; ‘frequent’:10-50%; ‘abundant’:50-90%; ‘continuous’:>90%.  Persistence for sporadic discharges: ‘rare’:<1/hr; ‘occasional’:1/min-1/hr; ‘frequent’:>1/min; ‘abundant’:>1/10 sec.  RPP=rhythmic and periodic patterns; GRDA=generalized rhythmic delta activity; FIRDA=frontal intermittent GRDA; LRDA=lateralized rhythmic delta activity; TIRDA=temporal intermittent rhythmic delta activity;  LPD=PLED=lateralized periodic discharges; GPD=generalized periodic discharges; BIPDs =bilateral independent periodic discharges; Mf=multifocal; SIRPDs=stimulus induced rhythmic, periodic, or ictal appearing discharges; BIRDs=brief potentially ictal rhythmic discharges >4 Hz, lasting .5-10s; PFA (paroxysmal bursts >13 Hz or =8 Hz <10s).  Modifiers: +F=with fast component; +S=with spike component; +R=with rhythmic component.  S-B=burst suppression pattern.  Max=maximal. N1-drowsy; N2-stage II sleep; N3-slow wave sleep. SSS/BETS=small sharp spikes/benign epileptiform transients of sleep. HV=hyperventilation; PS=photic stimulation]]]    Daily EEG Visual Analysis    FINDINGS:      Background:  Continuity: Continuous  Symmetry: Symmetric  Posterior dominant rhythm (PDR): Occasional, 9 Hz, reactive to eye closure. Symmetric low-amplitude frontal beta in wakefulness.  State Change: Present  Voltage: Normal  Anterior-Posterior Gradient: Present  Other background findings: Predominant awake background consists of diffuse low-amplitude beta activity. Occasional diffuse theta.  Breach: Absent    Background Slowing:  Generalized slowing: None  Focal slowing: None    State Changes:   Drowsiness is characterized by fragmentation, attenuation, and slowing of the background activity.  Stage 2 sleep is characterized by symmetric K complexes and sleep spindles.     Interictal Findings:  None    Electrographic and Electroclinical seizures:  None    Other Clinical Events:  09:54:  Clinically: On video, others are in the room with patient, patient has eyes open and is looking around. Then patient has eyes closed, head initially slightly turned to R, then to L, intermittent low-amplitude slightly spasmodic truncal flexion/extension movements while reclining in bed. When provider opens patient's eyes, there is leftward gaze followed by midline and rightward gaze. Patient has antigravity effort in BUE when instructed during exam.  Neurology team reported eyes closed, leftward gaze, BLE shaking, and truncal flexion/extension movements. Limited evaluation of BLE on video due to camera angle and bed sheet.  EEG shows no epileptiform abnormalities or electrographic seizure.    13:46:  Patient motions to a visitor, takes her glasses off, points to her head, and visitor changes bed position from reclining to lying down. Patient moves all extremities spontaneously, then slowly turns head to left, eyes closed, lying still in bed. Staff comes to bedside. Patient not responding to staff. EEG shows awake background, no epileptiform abnormalities.    17:34:  Patient is reclining in bed with eyes closed, then tilts her head back with some truncal extension, then has irregular flailing/shaking of all extremities and pelvic thrusting, starting and stopping. Then has low-amplitude truncal and BUE shaking, somewhat laterally, while sliding down in the bed. Then turns head to left, eyes still closed. Intermittently sliding upward and downward in bed using her legs.  EEG is intermittently obscured by movement and muscle artifact. Outside of artifact, there is awake background, occasional drowsiness, and no epileptiform abnormalities.    Activation Procedures:   Hyperventilation is not performed.    Photic stimulation is not performed.    Artifacts:  Intermittent myogenic and movement artifacts are present.    EKG:  Single-lead EKG shows regular rhythm.    EEG Classification / Summary:  Normal video-EEG in the awake, drowsy, and asleep states.   -Multiple different events (spasmodic truncal flexion; lying still not responding; or intermittent irregular flailing/shaking of extremities, intermittent pelvic thrusting, and lying still with head turned to left and eyes closed for a prolonged period afterward) with no associated epileptiform abnormalities  -No focal or epileptiform abnormalities are captured.     Clinical Impression:  Normal video-EEG.  The captured events are likely non-epileptic in nature.          -------------------------------------------------------------------------------------------------------    Lizzie Post MD  Attending Physician, [facility]    -------------------------------------------------------------------------------------------------------    To reach EEG technologist:  Please use the pager number for the appropriate hospital or contact the .  At [facility] - Pager #: [phone]    To reach EEG-reading physician:  [facility] EEG Reading Room Phone #: [phone]  Epilepsy Answering Service after 5PM and before 8:30AM: Phone #: [phone]

## 2024-01-30 NOTE — DISCHARGE NOTE PROVIDER - NSFOLLOWUPCLINICS_GEN_ALL_ED_FT
[facility]  Internal Medicine  [address] [place] [address]  Phone: [phone]  Fax: [phone]  Follow Up Time: 2 weeks

## 2024-01-31 LAB
ANION GAP SERPL CALC-SCNC: 11 MMOL/L — SIGNIFICANT CHANGE UP (ref 7–14)
BUN SERPL-MCNC: 17 MG/DL — SIGNIFICANT CHANGE UP (ref 7–23)
CALCIUM SERPL-MCNC: 9.6 MG/DL — SIGNIFICANT CHANGE UP (ref 8.4–10.5)
CHLORIDE SERPL-SCNC: 104 MMOL/L — SIGNIFICANT CHANGE UP (ref 98–107)
CK MB BLD-MCNC: SIGNIFICANT CHANGE UP % (ref 0–2.5)
CK MB CFR SERPL CALC: <1 NG/ML — SIGNIFICANT CHANGE UP
CK SERPL-CCNC: 44 U/L — SIGNIFICANT CHANGE UP (ref 25–170)
CO2 SERPL-SCNC: 24 MMOL/L — SIGNIFICANT CHANGE UP (ref 22–31)
CREAT SERPL-MCNC: 0.89 MG/DL — SIGNIFICANT CHANGE UP (ref 0.5–1.3)
EGFR: 78 ML/MIN/1.73M2 — SIGNIFICANT CHANGE UP
GLUCOSE SERPL-MCNC: 82 MG/DL — SIGNIFICANT CHANGE UP (ref 70–99)
HCT VFR BLD CALC: 42.7 % — SIGNIFICANT CHANGE UP (ref 34.5–45)
HGB BLD-MCNC: 13.9 G/DL — SIGNIFICANT CHANGE UP (ref 11.5–15.5)
MAGNESIUM SERPL-MCNC: 2.1 MG/DL — SIGNIFICANT CHANGE UP (ref 1.6–2.6)
MCHC RBC-ENTMCNC: 27.5 PG — SIGNIFICANT CHANGE UP (ref 27–34)
MCHC RBC-ENTMCNC: 32.6 GM/DL — SIGNIFICANT CHANGE UP (ref 32–36)
MCV RBC AUTO: 84.6 FL — SIGNIFICANT CHANGE UP (ref 80–100)
NRBC # BLD: 0 /100 WBCS — SIGNIFICANT CHANGE UP (ref 0–0)
NRBC # FLD: 0 K/UL — SIGNIFICANT CHANGE UP (ref 0–0)
PHOSPHATE SERPL-MCNC: 4.2 MG/DL — SIGNIFICANT CHANGE UP (ref 2.5–4.5)
PLATELET # BLD AUTO: 284 K/UL — SIGNIFICANT CHANGE UP (ref 150–400)
POTASSIUM SERPL-MCNC: 4.6 MMOL/L — SIGNIFICANT CHANGE UP (ref 3.5–5.3)
POTASSIUM SERPL-SCNC: 4.6 MMOL/L — SIGNIFICANT CHANGE UP (ref 3.5–5.3)
RBC # BLD: 5.05 M/UL — SIGNIFICANT CHANGE UP (ref 3.8–5.2)
RBC # FLD: 13.3 % — SIGNIFICANT CHANGE UP (ref 10.3–14.5)
SODIUM SERPL-SCNC: 139 MMOL/L — SIGNIFICANT CHANGE UP (ref 135–145)
TROPONIN T, HIGH SENSITIVITY RESULT: <6 NG/L — SIGNIFICANT CHANGE UP
WBC # BLD: 5 K/UL — SIGNIFICANT CHANGE UP (ref 3.8–10.5)
WBC # FLD AUTO: 5 K/UL — SIGNIFICANT CHANGE UP (ref 3.8–10.5)

## 2024-01-31 PROCEDURE — 93010 ELECTROCARDIOGRAM REPORT: CPT

## 2024-01-31 PROCEDURE — 71045 X-RAY EXAM CHEST 1 VIEW: CPT | Mod: 26

## 2024-01-31 PROCEDURE — 99222 1ST HOSP IP/OBS MODERATE 55: CPT

## 2024-01-31 PROCEDURE — 99232 SBSQ HOSP IP/OBS MODERATE 35: CPT

## 2024-01-31 RX ORDER — KETOROLAC TROMETHAMINE 30 MG/ML
15 SYRINGE (ML) INJECTION ONCE
Refills: 0 | Status: DISCONTINUED | OUTPATIENT
Start: 2024-01-31 | End: 2024-01-31

## 2024-01-31 RX ORDER — ONDANSETRON 8 MG/1
4 TABLET, FILM COATED ORAL EVERY 8 HOURS
Refills: 0 | Status: DISCONTINUED | OUTPATIENT
Start: 2024-01-31 | End: 2024-02-01

## 2024-01-31 RX ADMIN — CHLORHEXIDINE GLUCONATE 1 APPLICATION(S): 213 SOLUTION TOPICAL at 17:12

## 2024-01-31 RX ADMIN — Medication 650 MILLIGRAM(S): at 17:30

## 2024-01-31 RX ADMIN — Medication 650 MILLIGRAM(S): at 06:30

## 2024-01-31 RX ADMIN — ENOXAPARIN SODIUM 40 MILLIGRAM(S): 100 INJECTION SUBCUTANEOUS at 17:15

## 2024-01-31 RX ADMIN — Medication 650 MILLIGRAM(S): at 16:45

## 2024-01-31 RX ADMIN — Medication 15 MILLIGRAM(S): at 18:51

## 2024-01-31 RX ADMIN — Medication 1 TABLET(S): at 19:12

## 2024-01-31 RX ADMIN — VALSARTAN 160 MILLIGRAM(S): 80 TABLET ORAL at 06:01

## 2024-01-31 RX ADMIN — Medication 15 MILLIGRAM(S): at 18:09

## 2024-01-31 RX ADMIN — Medication 650 MILLIGRAM(S): at 06:00

## 2024-01-31 RX ADMIN — ONDANSETRON 4 MILLIGRAM(S): 8 TABLET, FILM COATED ORAL at 18:09

## 2024-01-31 NOTE — PHYSICAL THERAPY INITIAL EVALUATION ADULT - PERTINENT HX OF CURRENT PROBLEM, REHAB EVAL
Pt. admitted after witnessed seizure with recurrence of episodes. Per documentation, CT head/perfusion: no evidence of significant stenosis, occlusion or dissection bilateral extracranial carotid arteries. No evidence of large vessel occlusion, definitive aneurysm or vascular malformation intracranial circulation. EEG shows no epileptiform abnormalities or electrographic seizure.

## 2024-01-31 NOTE — PROGRESS NOTE ADULT - PROBLEM SELECTOR PLAN 2
Home medication: amlodipine-valsartan   Hypertense in the ED - likely transiently from seizure? vs hypertensive emergency  SBP increased to 186/113 during the episode, decreased on its own. SBP in 140s  C/w valsartan 160, c/w labetalol PRN  If persistently hypertensive, can add back amlodipine    #Abnormal UA- pt asymptomatic. Denies dysuria or frequency. pt endorses frequent UTI which she self treats with cranberry juice. Advised pt to FU with PMD for repeat UA in 2-3 weeks as UA with small blood. pt agreeable

## 2024-01-31 NOTE — PHYSICAL THERAPY INITIAL EVALUATION ADULT - GENERAL OBSERVATIONS, REHAB EVAL
Consult received, chart reviewed. Patient received in bed, no apparent distress, +tele. Pt. agreeable to PT Evaluation.

## 2024-01-31 NOTE — PHYSICAL THERAPY INITIAL EVALUATION ADULT - REHAB POTENTIAL, PT EVAL
Chief Complaint   Patient presents with   • Follow-Up     back    • Medication Refill     norco    • Immunizations     influenza,TDAP        HISTORY OF PRESENT ILLNESS: Patient is a 53 y.o. male established patient who presents today to discuss the medical issues below.    Midline low back pain without sciatica  patient states his back is about the same.  He was unable to get the xray as the machine in [place] was down.  He did do 4 physical therapy and states that helped.  Has not done recently as he is too busy.  Pain begins at 2 out of 10 at the beginning of the week and by the end of the week he is in 6 out of 10. Findings is particularly irritating. He is having some neck tightness as well. He's got no numbness tingling weakness of the extremities. He has rare occasional extension from the right buttocks area.      Patient Active Problem List    Diagnosis Date Noted   • Epididymitis, left 11/22/2016   • Colon polyp 06/13/2016   • Reactive airways dysfunction syndrome with acute exacerbation 03/03/2016   • Allergic rhinitis 01/09/2016   • Chronic maxillary sinusitis 01/06/2016   • Midline low back pain without sciatica 01/06/2016   • Post-traumatic osteoarthritis of knee 01/06/2016       Allergies:Review of patient's allergies indicates no known allergies.    Current Outpatient Prescriptions   Medication Sig Dispense Refill   • cyclobenzaprine (FLEXERIL) 10 MG Tab Take 1 Tab by mouth at bedtime as needed. 30 Tab 4   • diclofenac EC (VOLTAREN) 75 MG Tablet Delayed Response Take 1 Tab by mouth 2 times a day. 60 Tab 6   • hydrocodone-acetaminophen (NORCO) 5-325 MG Tab per tablet Take 1 Tab by mouth every 8 hours as needed. 30 Tab 0   • MethylPREDNISolone (MEDROL DOSEPAK) 4 MG Tablet Therapy Pack As per Dose Matt 1 Kit 0   • montelukast (SINGULAIR) 10 MG Tab TAKE 1 TABLET BY MOUTH DAILY 90 Tab 3   • therapeutic multivitamin-minerals (THERAGRAN-M) Tab Take 1 Tab by mouth every day.     • albuterol (VENTOLIN OR  "PROVENTIL) 108 (90 BASE) MCG/ACT Aero Soln inhalation aerosol Inhale 2 Puffs by mouth every 6 hours as needed for Shortness of Breath. 8.5 g 3     No current facility-administered medications for this visit.          Past Medical History:   Diagnosis Date   • Epididymitis, left 2016   • Reactive airways dysfunction syndrome with acute exacerbation 3/3/2016   • Allergic rhinitis 2016   • Chronic maxillary sinusitis 2016   • Midline low back pain without sciatica 2016   • Post-traumatic osteoarthritis of knee 2016    Hx fx knee       Social History   Substance Use Topics   • Smoking status: Never Smoker   • Smokeless tobacco: Current User     Types: Chew   • Alcohol use No       Family Status   Relation Status   • Father  at age 53    asbestosis, lung cancer   • Mother Alive   • Son Alive   History reviewed. No pertinent family history.    ROS:    Respiratory: Negative for cough, sputum production, shortness of breath or wheezing.    Cardiovascular: Negative for chest pain, palpitations, orthopnea, dyspnea with exertion or edema.   Gastrointestinal: Negative for GI upset, nausea, vomiting, abdominal pain, constipation or diarrhea.   Genitourinary: Negative for dysuria, urgency, hesitancy or frequency.       Exam:    Blood pressure 130/86, pulse (!) 104, temperature 37.2 °C (98.9 °F), resp. rate 16, height 1.816 m (5' 11.5\"), weight 88.5 kg (195 lb), SpO2 93 %.  General:  Well nourished, well developed male in NAD.  Spine: No focal areas of tenderness bilateral lumbar spine paravertebral spasm, point tenderness at the right SI area, negative straight leg testing.  Pulmonary: Clear to ausculation and percussion.  Normal effort. No rales, rhonchi, or wheezing.  Cardiovascular: Regular rate and rhythm without murmur.   Abdomen: Normal bowel sounds soft and nontender no palpable liver spleen bladder mass.  Extremities: No LE edema noted.  Neuro: Grossly nonfocal.  Psych: Alert and oriented to " person, place, and time. Appropriate mood and conversation.      This dictation was created using voice recognition software. I have made reasonable attempts to correct errors, however, errors of grammar and content may exist.          Assessment/Plan:    1. Need for diphtheria-tetanus-pertussis (Tdap) vaccine    - TDAP VACCINE =>6YO IM    2. Needs flu shot    - INFLUENZA VACCINE QUAD INJECTION >3Y(PRESERVED)    3. Midline low back pain without sciatica, unspecified chronicity      4. Chronic midline low back pain without sciatica  Long discussion held with the patient again regarding his low back and probable degenerative disc disease. Discussed our options of referral with MRI versus conservative management. At this point will utilize a Medrol Dosepak followed by diclofenac. When necessary nighttime Flexeril. Minimal narcotics use was no evidence of adverse reactions or abuse, refills made. Reviewed importance of x-ray will attempt to facilitate that. Proceed with conservative management will proceed to referral when necessary any exacerbation or lack of improvement and patient will contact us per my chart. Back booklet, chiropractic discussed.  - cyclobenzaprine (FLEXERIL) 10 MG Tab; Take 1 Tab by mouth at bedtime as needed.  Dispense: 30 Tab; Refill: 4  - diclofenac EC (VOLTAREN) 75 MG Tablet Delayed Response; Take 1 Tab by mouth 2 times a day.  Dispense: 60 Tab; Refill: 6       Patient was seen for  25 minutes face to face of which more than 50% of the time was spent in counseling and coordination of care regarding the above problems.     good, to achieve stated therapy goals

## 2024-01-31 NOTE — PHYSICAL THERAPY INITIAL EVALUATION ADULT - GAIT DISTANCE, PT EVAL
2 steps. Limited secondary to pt. reporting room spinning dizziness. /105, HR 66 bpm. Symptoms improve with seated rest. Jerilyn ABERNATHY made aware.

## 2024-01-31 NOTE — PROGRESS NOTE ADULT - PROBLEM SELECTOR PLAN 1
Episode at work, followed by recurrent episodes of seizure like activity in the ED s/p ativan 4mg x1 for recurrent seizure   CT head/perfusion as above: No evidence of significant stenosis, occlusion or dissection bilateral extracranial carotid arteries. No evidence of large vessel occlusion, definitive aneurysm or vascular   malformation intracranial circulation.  Appreciate Neurology recs -  Multiple episodes of convulsions with c/f seizures, also noted to have elevated BPs to 200s outside, in ED has been 180s-190s. Etiology of new onset seizure-like activity is unknown and patient will benefit from further workup to r/o treatable etiologies.  s/p Keppra load followed by keppra 1gm BID  EEG shows no epileptiform abnormalities or electrographic seizure.  Appreciate Neuro recs- Advise to dc keppra as no seizures noted on EEG.  FU  MRI brain w/wo (epilepsy protocol) as family insisting pt gets it done inpatient. Advise Psych eval. As per neuro- no restriction to driving  Pt was seen by Psych on 1/31-defer medications at this time, may benefit from counseling, resources as below. F/u with: [facility] [id number] [facility] [address] [facility], [address], [place] [address]

## 2024-01-31 NOTE — PHYSICAL THERAPY INITIAL EVALUATION ADULT - ADDITIONAL COMMENTS
Pt. was left seated at edge of bed post PT Evaluation, no apparent distress, all lines intact, call bell within reach, bed alarm engaged. Jerilyn ABERNATHY made aware.

## 2024-01-31 NOTE — CHART NOTE - NSCHARTNOTESELECT_GEN_ALL_CORE
Event Note
Neurology
Neurology
Neurology/Event Note
EEG prelim
Event Note
Neurology
Seizure-Like Activity/Event Note

## 2024-01-31 NOTE — PROGRESS NOTE ADULT - SUBJECTIVE AND OBJECTIVE BOX
[facility]  Hospitalist  Pager- [phone]      LIZZIE RAHMAN, MRN-[id number]    Patient is a 53y old  Female who presents with a chief complaint of seizure (28 Jan 2024 12:27)    OVERNIGHT EVENTS/INTERVAL/SUBJECTIVE:  Pt seen and examined by me. No new complaints  Didnt sleep last nights. Slept only 2 hours. had a a headache this AM  Walked to the bedside commode and back       CONSTITUTIONAL: No weakness. No fatigue. No fever.  HEAD: No head trauma.   EYES: No vision changes.  ENT: No hearing changes or tinnitus. No ear pain. No changes in smell. No nasal congestion or discharge. No sore throat. No voice hoarseness.   NECK: No neck pain or stiffness. No lumps.  RESPIRATORY: No cough. No SOB. No wheezing. No hemoptysis.   CARDIOVASCULAR: No chest pain. No palpitations.   GASTROINTESTINAL: No dysphagia. No ABD pain. No distension. No constipation. No diarrhea. No pain with defecation. No hematemesis. No hematochezia or melena.  BACK: No back pain.  GENITOURINARY: No dysuria. No frequency or urgency. No hesitancy. No incontinence. No urinary retention. No suprapubic pain. No hematuria.  EXTREMITY: No swelling.  MUSCULOSKELETAL: No joint pain or swelling. No fractures. No stiffness.    SKIN: No rashes. No itching. No skin, hair, or nail changes.  NEUROLOGICAL: No weakness or paralysis. No lightheadedness or dizziness. No HA. No numbness or tingling.   PSYCHIATRIC: No depression.       Vital Signs Last 24 Hrs  T(C): 36.4 (31 Jan 2024 05:30), Max: 36.7 (30 Jan 2024 20:07)  T(F): 97.6 (31 Jan 2024 05:30), Max: 98 (30 Jan 2024 20:07)  HR: 65 (31 Jan 2024 05:30) (65 - 67)  BP: 136/79 (31 Jan 2024 05:30) (129/87 - 144/91)  BP(mean): 69 (30 Jan 2024 20:07) (69 - 69)  RR: 19 (31 Jan 2024 05:30) (18 - 19)  SpO2: 100% (31 Jan 2024 05:30) (98% - 100%)    Parameters below as of 31 Jan 2024 05:30  Patient On (Oxygen Delivery Method): room air        MEDICATIONS  (STANDING):  chlorhexidine 2% Cloths 1 Application(s) Topical daily  enoxaparin Injectable 40 milliGRAM(s) SubCutaneous every 24 hours  valsartan 160 milliGRAM(s) Oral daily    MEDICATIONS  (PRN):  acetaminophen     Tablet .. 650 milliGRAM(s) Oral every 6 hours PRN Temp greater or equal to 38C (100.4F), Mild Pain (1 - 3)  labetalol Injectable 10 milliGRAM(s) IV Push every 6 hours PRN SBP >170  LORazepam   Injectable 1 milliGRAM(s) IV Push once PRN > 3minutes of general tonic clonic seizure activity  melatonin 3 milliGRAM(s) Oral at bedtime PRN Insomnia    CONSTITUTIONAL: No acute distress. Somnolent, intermittently arousable.  RESPIRATORY: CTAB. No wheezes, rales, or rhonchi. No accessory muscle use. No apparent respiratory distress.  CARDIOVASCULAR: +S1/S2. No audible S3/S4. Regular rate and rhythm. No murmurs, rubs, or gallops. No LE swelling or edema.  GASTROINTESTINAL: Soft, nontender, nondistended. +BS. No rebound or guarding.   MUSCULOSKELETAL: Very sparse movements.  NEUROLOGICAL: Limited assessment due to mental status.  PSYCHIATRIC: Somnolent affect. A&Ox3.      LABS                                                 13.9   5.00  )-----------( 284      ( 31 Jan 2024 06:50 )             42.7     01-31    139  |  104  |  17  ----------------------------<  82  4.6   |  24  |  0.89    Ca    9.6      31 Jan 2024 06:50  Phos  4.2     01-31  Mg     2.10     01-31        CAPILLARY BLOOD GLUCOSE        Urinalysis Basic - ( 28 Jan 2024 05:18 )    Color: x / Appearance: x / SG: x / pH: x  Gluc: 88 mg/dL / Ketone: x  / Bili: x / Urobili: x   Blood: x / Protein: x / Nitrite: x   Leuk Esterase: x / RBC: x / WBC x   Sq Epi: x / Non Sq Epi: x / Bacteria: x            RADIOLOGY AND ADDITIONAL TESTS:    CONSULTANT NOTES REVIEWED: Y    CARE DISCUSSED WITH THE FOLLOWING CONSULTANTS/PROVIDERS:

## 2024-01-31 NOTE — CHART NOTE - NSCHARTNOTEFT_GEN_A_CORE
Patient assessed at bedside, c/o nausea/vomiting x2 episodes after receiving PO Tylenol. Patient states this happened before with PO Tylenol. Patient also endorses chest pain, worse with inspiration which began after vomiting event. Also admits to foul smelling urine and burning upon urination. Denies lightheadedness, dizziness, abdominal pain, numbness, tingling. Family present at bedside.     ICU Vital Signs Last 24 Hrs  T(C): 36.5 (31 Jan 2024 12:10), Max: 36.7 (30 Jan 2024 20:07)  T(F): 97.7 (31 Jan 2024 12:10), Max: 98 (30 Jan 2024 20:07)  HR: 60 (31 Jan 2024 12:10) (60 - 65)  BP: 151/92 (31 Jan 2024 12:10) (136/79 - 151/92)  BP(mean): 69 (30 Jan 2024 20:07) (69 - 69)  ABP: --  ABP(mean): --  RR: 18 (31 Jan 2024 12:10) (18 - 19)  SpO2: 100% (31 Jan 2024 12:10) (98% - 100%)    O2 Parameters below as of 31 Jan 2024 12:10  Patient On (Oxygen Delivery Method): room air     CONSTITUTIONAL: Well groomed   EYES: PERRLA and symmetric, EOMI    RESP: No respiratory distress, no use of accessory muscles; CTA b/l, no WRR  CV: RRR, +S1S2, no MRG; no peripheral edema  GI: Soft, NT, ND, no rebound, no guarding    SKIN: No rashes or ulcers noted   NEURO: sensation intact in upper and lower extremities b/l to light touch   PSYCH: A+O x 3, mood and affect appropriate    Ordered IV Toradol x1 for lower back pain and Zofran PRN for n/v.  Will order STAT EKG, cardiac enzymes and CXR. Given urinary symptoms (and documented PCN allergy) will start pt on Bactrim (dose discussed with pharmacy). UCx pending. Case discussed with Dr. Jolly. Will endorse to night team to follow up results. All questions/concerns addressed.

## 2024-01-31 NOTE — CONSULT NOTE ADULT - SUBJECTIVE AND OBJECTIVE BOX
Patient is a 53y old  Female who presents with a chief complaint of seizure (31 Jan 2024 09:56)      HPI:  53 y.o female with HTN, presents after witnessed seizure.     Per documentation patient had a seizure at work, witnessed by coworkers, then another seizure when EMS arriven. Patient presented to the ED, and since then has 2 additional episodes. Per family,  and daughter at bedside, patient has never had a seizure before. Patient has been under stress lately because of work. Only medication is for blood pressure: amold-valsartan. Per  patient stated she did not felt well yesterday but went to work without issues. No family history of seizures. No sick contacts, no recent illness, no smoking or drugs.       In the ED, patient was a stroke code, CT head/perfusion was negative for acute hemorrhage, no evidence of significant stenosis, occlusion or dissection bilateral extracranial carotid arteries. Patient received 1g of Keppra, then 2g for loading. Seen by neurology who recommended MRI brain, video EEG.  (26 Jan 2024 16:34)    per chart patient v eeg negative, patient diagnosed with psychogenic non epileptic seizure  mri pending     REVIEW OF SYSTEMS  Constitutional - No fever, No weight loss, No fatigue  HEENT - No eye pain, No visual disturbances, No difficulty hearing, No tinnitus, No vertigo, No neck pain  Respiratory - No cough, No wheezing, No shortness of breath  Cardiovascular - No chest pain, No palpitations  Gastrointestinal - No abdominal pain, No nausea, No vomiting, No diarrhea, No constipation  Genitourinary - No dysuria, No frequency, No hematuria, No incontinence  Neurological - No headaches, No memory loss, No loss of strength, No numbness, No tremors  Skin - No itching, No rashes, No lesions   Endocrine - No temperature intolerance  Musculoskeletal - No joint pain, No joint swelling, No muscle pain  Psychiatric - No depression, No anxiety    PAST MEDICAL & SURGICAL HISTORY  Hypertension    Anxiety    Depression    No significant past surgical history        SOCIAL HISTORY  Smoking - Denied  EtOH - Denied   Drugs - Denied    FUNCTIONAL HISTORY  Lives   Independent    CURRENT FUNCTIONAL STATUS  min assist with PT today    FAMILY HISTORY   No pertinent family history in first degree relatives        RECENT LABS/IMAGING  CBC Full  -  ( 31 Jan 2024 06:50 )  WBC Count : 5.00 K/uL  RBC Count : 5.05 M/uL  Hemoglobin : 13.9 g/dL  Hematocrit : 42.7 %  Platelet Count - Automated : 284 K/uL  Mean Cell Volume : 84.6 fL  Mean Cell Hemoglobin : 27.5 pg  Mean Cell Hemoglobin Concentration : 32.6 gm/dL  Auto Neutrophil # : x  Auto Lymphocyte # : x  Auto Monocyte # : x  Auto Eosinophil # : x  Auto Basophil # : x  Auto Neutrophil % : x  Auto Lymphocyte % : x  Auto Monocyte % : x  Auto Eosinophil % : x  Auto Basophil % : x    01-31    139  |  104  |  17  ----------------------------<  82  4.6   |  24  |  0.89    Ca    9.6      31 Jan 2024 06:50  Phos  4.2     01-31  Mg     2.10     01-31      Urinalysis Basic - ( 31 Jan 2024 06:50 )    Color: x / Appearance: x / SG: x / pH: x  Gluc: 82 mg/dL / Ketone: x  / Bili: x / Urobili: x   Blood: x / Protein: x / Nitrite: x   Leuk Esterase: x / RBC: x / WBC x   Sq Epi: x / Non Sq Epi: x / Bacteria: x        VITALS  T(C): 36.4 (01-31-24 @ 05:30), Max: 36.7 (01-30-24 @ 20:07)  HR: 65 (01-31-24 @ 05:30) (65 - 67)  BP: 136/79 (01-31-24 @ 05:30) (129/87 - 144/91)  RR: 19 (01-31-24 @ 05:30) (18 - 19)  SpO2: 100% (01-31-24 @ 05:30) (98% - 100%)  Wt(kg): --    ALLERGIES  penicillins (Anaphylaxis)  shellfish (Rash)      MEDICATIONS   acetaminophen     Tablet .. 650 milliGRAM(s) Oral every 6 hours PRN  chlorhexidine 2% Cloths 1 Application(s) Topical daily  enoxaparin Injectable 40 milliGRAM(s) SubCutaneous every 24 hours  labetalol Injectable 10 milliGRAM(s) IV Push every 6 hours PRN  LORazepam   Injectable 1 milliGRAM(s) IV Push once PRN  melatonin 3 milliGRAM(s) Oral at bedtime PRN  valsartan 160 milliGRAM(s) Oral daily      ----------------------------------------------------------------------------------------  PHYSICAL EXAM  Constitutional - NAD, Comfortable  HEENT - NCAT, EOMI  Neck - Supple, No limited ROM  Chest - CTA bilaterally, No wheeze, No rhonchi, No crackles  Cardiovascular - RRR, S1S2, No murmurs  Abdomen - BS+, Soft, NTND  Extremities - No C/C/E, No calf tenderness   Neurologic Exam -                    Cognitive - Awake, Alert, AAO to self, place, date, year, situation     Communication - Fluent, No dysarthria     Cranial Nerves - CN 2-12 intact     Motor - No focal deficits                    LEFT    UE - ShAB 5/5, EF 5/5, EE 5/5, WE 5/5,  5/5                    RIGHT UE - ShAB 5/5, EF 5/5, EE 5/5, WE 5/5,  5/5                    LEFT    LE - HF 5/5, KE 5/5, DF 5/5, PF 5/5                    RIGHT LE - HF 5/5, KE 5/5, DF 5/5, PF 5/5        Sensory - Intact to LT     Reflexes - DTR Intact, No primitive reflexive     Coordination - FTN intact     OculoVestibular - No saccades, No nystagmus, VOR         Balance - WNL Static  Psychiatric - Mood stable, Affect WNL  ----------------------------------------------------------------------------------------  ASSESSMENT/PLAN    Pain -  DVT PPX -   Rehab -     incomplete note Patient is a 53y old  Female who presents with a chief complaint of seizure (31 Jan 2024 09:56)    Interval events: Patient can stand with min assist, but reports dizziness and weakness upon standing. She also reports pain in knees when standing, which is her baseline but the pain typically does not affect her mobility. Pt lives with her  in a house. She has 5 steps at home. Pt works at [employer].        REVIEW OF SYSTEMS  Constitutional - No fever, No weight loss, No fatigue  HEENT - No eye pain, No visual disturbances, No difficulty hearing, No tinnitus, No vertigo, No neck pain  Respiratory - No cough, No wheezing, No shortness of breath  Cardiovascular - No chest pain, No palpitations  Gastrointestinal - No abdominal pain, No nausea, No vomiting, No diarrhea, No constipation  Genitourinary - No dysuria, No frequency, No hematuria, No incontinence  Neurological - No headaches, No memory loss, + loss of strength, No numbness, No tremors  Skin - No itching, No rashes, No lesions   Endocrine - No temperature intolerance  Musculoskeletal - No joint pain, No joint swelling, No muscle pain  Psychiatric - No depression, No anxiety    PAST MEDICAL & SURGICAL HISTORY  Hypertension    Anxiety    Depression    No significant past surgical history      SOCIAL HISTORY  Smoking - Denied  EtOH - Denied   Drugs - Denied    FUNCTIONAL HISTORY  Lives with  in house with 5 steps to enter  Independent at baseline, works at [employer]    CURRENT FUNCTIONAL STATUS  min assist with PT today    FAMILY HISTORY   No pertinent family history in first degree relatives        RECENT LABS/IMAGING  CBC Full  -  ( 31 Jan 2024 06:50 )  WBC Count : 5.00 K/uL  RBC Count : 5.05 M/uL  Hemoglobin : 13.9 g/dL  Hematocrit : 42.7 %  Platelet Count - Automated : 284 K/uL  Mean Cell Volume : 84.6 fL  Mean Cell Hemoglobin : 27.5 pg  Mean Cell Hemoglobin Concentration : 32.6 gm/dL  Auto Neutrophil # : x  Auto Lymphocyte # : x  Auto Monocyte # : x  Auto Eosinophil # : x  Auto Basophil # : x  Auto Neutrophil % : x  Auto Lymphocyte % : x  Auto Monocyte % : x  Auto Eosinophil % : x  Auto Basophil % : x    01-31    139  |  104  |  17  ----------------------------<  82  4.6   |  24  |  0.89    Ca    9.6      31 Jan 2024 06:50  Phos  4.2     01-31  Mg     2.10     01-31      Urinalysis Basic - ( 31 Jan 2024 06:50 )    Color: x / Appearance: x / SG: x / pH: x  Gluc: 82 mg/dL / Ketone: x  / Bili: x / Urobili: x   Blood: x / Protein: x / Nitrite: x   Leuk Esterase: x / RBC: x / WBC x   Sq Epi: x / Non Sq Epi: x / Bacteria: x        VITALS  T(C): 36.4 (01-31-24 @ 05:30), Max: 36.7 (01-30-24 @ 20:07)  HR: 65 (01-31-24 @ 05:30) (65 - 67)  BP: 136/79 (01-31-24 @ 05:30) (129/87 - 144/91)  RR: 19 (01-31-24 @ 05:30) (18 - 19)  SpO2: 100% (01-31-24 @ 05:30) (98% - 100%)  Wt(kg): --    ALLERGIES  penicillins (Anaphylaxis)  shellfish (Rash)      MEDICATIONS   acetaminophen     Tablet .. 650 milliGRAM(s) Oral every 6 hours PRN  chlorhexidine 2% Cloths 1 Application(s) Topical daily  enoxaparin Injectable 40 milliGRAM(s) SubCutaneous every 24 hours  labetalol Injectable 10 milliGRAM(s) IV Push every 6 hours PRN  LORazepam   Injectable 1 milliGRAM(s) IV Push once PRN  melatonin 3 milliGRAM(s) Oral at bedtime PRN  valsartan 160 milliGRAM(s) Oral daily      ----------------------------------------------------------------------------------------  PHYSICAL EXAM  Constitutional - NAD, Comfortable  HEENT - NCAT, EOMI  Chest - no respiratory distress  Cardiovascular - RRR, S1S2  Extremities - No C/C/E, No calf tenderness   Neurologic Exam -                    Cognitive - Awake, Alert, AAO to self, place, date, year, situation     Communication - Fluent, No dysarthria     Cranial Nerves - CN 2-12 intact     Motor -                      LEFT    UE - ShAB 5/5, EF 5/5, EE 5/5, WE 5/5,  5/5                    RIGHT UE - ShAB 5/5, EF 5/5, EE 5/5, WE 5/5,  4/5                    LEFT    LE - HF 5/5, KE 5/5, DF 5/5, PF 5/5                    RIGHT LE - HF 4/5, KE 5-/5, DF 5/5, PF 5/5        Sensory - Intact to LT     Reflexes - DTR Intact      Coordination - FTN intact     OculoVestibular - No saccades, No nystagmus, VOR         Balance - WNL Static  Psychiatric - Mood stable, Affect WNL  ----------------------------------------------------------------------------------------  ASSESSMENT/PLAN  53 year old female admitted for possible seizure, 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 Patient can tolerate 3 hours per day of therapy with medical supervision.

## 2024-01-31 NOTE — CHART NOTE - NSCHARTNOTEFT_GEN_A_CORE
NEUROLOGY FOLLOW-UP:    Spoke extensively with family regarding the diagnosis of non-epileptic seizures. Recommended psych service for further evaluation. Family still wanted to pursue MRI therefore, informed primary team who re-ordered MRI. Will follow-up results prior to discharge. Thank you.     Michelle Garcia   PGY-3  Department of Neurology  [facility] [employer] [facility]

## 2024-02-01 ENCOUNTER — TRANSCRIPTION ENCOUNTER (OUTPATIENT)
Age: 54
End: 2024-02-01

## 2024-02-01 VITALS
SYSTOLIC BLOOD PRESSURE: 125 MMHG | OXYGEN SATURATION: 98 % | TEMPERATURE: 98 F | HEART RATE: 77 BPM | DIASTOLIC BLOOD PRESSURE: 81 MMHG | RESPIRATION RATE: 18 BRPM

## 2024-02-01 LAB
ANION GAP SERPL CALC-SCNC: 13 MMOL/L — SIGNIFICANT CHANGE UP (ref 7–14)
BUN SERPL-MCNC: 19 MG/DL — SIGNIFICANT CHANGE UP (ref 7–23)
CALCIUM SERPL-MCNC: 9.4 MG/DL — SIGNIFICANT CHANGE UP (ref 8.4–10.5)
CHLORIDE SERPL-SCNC: 103 MMOL/L — SIGNIFICANT CHANGE UP (ref 98–107)
CO2 SERPL-SCNC: 22 MMOL/L — SIGNIFICANT CHANGE UP (ref 22–31)
CREAT SERPL-MCNC: 0.95 MG/DL — SIGNIFICANT CHANGE UP (ref 0.5–1.3)
EGFR: 72 ML/MIN/1.73M2 — SIGNIFICANT CHANGE UP
GLUCOSE SERPL-MCNC: 82 MG/DL — SIGNIFICANT CHANGE UP (ref 70–99)
HCT VFR BLD CALC: 40.6 % — SIGNIFICANT CHANGE UP (ref 34.5–45)
HGB BLD-MCNC: 13.6 G/DL — SIGNIFICANT CHANGE UP (ref 11.5–15.5)
MAGNESIUM SERPL-MCNC: 1.9 MG/DL — SIGNIFICANT CHANGE UP (ref 1.6–2.6)
MCHC RBC-ENTMCNC: 27.9 PG — SIGNIFICANT CHANGE UP (ref 27–34)
MCHC RBC-ENTMCNC: 33.5 GM/DL — SIGNIFICANT CHANGE UP (ref 32–36)
MCV RBC AUTO: 83.4 FL — SIGNIFICANT CHANGE UP (ref 80–100)
NRBC # BLD: 0 /100 WBCS — SIGNIFICANT CHANGE UP (ref 0–0)
NRBC # FLD: 0 K/UL — SIGNIFICANT CHANGE UP (ref 0–0)
PHOSPHATE SERPL-MCNC: 3.7 MG/DL — SIGNIFICANT CHANGE UP (ref 2.5–4.5)
PLATELET # BLD AUTO: 267 K/UL — SIGNIFICANT CHANGE UP (ref 150–400)
POTASSIUM SERPL-MCNC: 4.9 MMOL/L — SIGNIFICANT CHANGE UP (ref 3.5–5.3)
POTASSIUM SERPL-SCNC: 4.9 MMOL/L — SIGNIFICANT CHANGE UP (ref 3.5–5.3)
RBC # BLD: 4.87 M/UL — SIGNIFICANT CHANGE UP (ref 3.8–5.2)
RBC # FLD: 13.3 % — SIGNIFICANT CHANGE UP (ref 10.3–14.5)
SODIUM SERPL-SCNC: 138 MMOL/L — SIGNIFICANT CHANGE UP (ref 135–145)
WBC # BLD: 10.39 K/UL — SIGNIFICANT CHANGE UP (ref 3.8–10.5)
WBC # FLD AUTO: 10.39 K/UL — SIGNIFICANT CHANGE UP (ref 3.8–10.5)

## 2024-02-01 PROCEDURE — 99239 HOSP IP/OBS DSCHRG MGMT >30: CPT

## 2024-02-01 PROCEDURE — 70553 MRI BRAIN STEM W/O & W/DYE: CPT | Mod: 26

## 2024-02-01 PROCEDURE — 99232 SBSQ HOSP IP/OBS MODERATE 35: CPT

## 2024-02-01 RX ORDER — VALSARTAN 80 MG/1
1 TABLET ORAL
Qty: 30 | Refills: 0
Start: 2024-02-01 | End: 2024-03-01

## 2024-02-01 RX ORDER — AMLODIPINE AND VALSARTAN 5; 320 MG/1; MG/1
1 TABLET, FILM COATED ORAL
Refills: 0 | DISCHARGE

## 2024-02-01 RX ADMIN — Medication 1 TABLET(S): at 05:29

## 2024-02-01 RX ADMIN — VALSARTAN 160 MILLIGRAM(S): 80 TABLET ORAL at 05:19

## 2024-02-01 RX ADMIN — Medication 1 TABLET(S): at 17:41

## 2024-02-01 RX ADMIN — CHLORHEXIDINE GLUCONATE 1 APPLICATION(S): 213 SOLUTION TOPICAL at 17:41

## 2024-02-01 NOTE — PROGRESS NOTE ADULT - ASSESSMENT
53-year-old L-handed female with past medical history of hypertension, prediabetes, vitamin D deficiency, ?per outpatient records hyperlipidemia (LDL 93 11/2022) presenting as a code stroke for AMS + aphasia s/p seizure-like episodes. At [facility], pt was witnessed by staff to have generalized convulsions lasting roughly 2 minutes, at the time BP reportedly in 240s. Upon arrival of EMS, witnessed to have another episode 30 seconds of convulsions no incontinence noted, . EEG re-connected on 1/29/24 and was able to capture one event w/ no EEG correlation.     Impression: Multiple episodes of "convulsions" w/ BP abnormalities, however, w/ no EEG correlate likely 2/2 non-epileptic seizure-like activity. Will continue to monitor for other seizure-like activities.     Recommendations:  [] Continue vEEG; will reassess in AM  [] Discontinue Keppra; monitor off ASM  [] After reviewing full report today, or tomorrow, will consider further imaging including MRI brain w/ and w/o contrast  [] Seizure/fall/aspiration precautions  [] Rest of care per primary team    Patient seen and d/w Neurology attending. 
53-year-old L-handed female with past medical history of hypertension, prediabetes, vitamin D deficiency, ?per outpatient records hyperlipidemia ( (LDL 93 11/2022) presenting as a code stroke for AMS + aphasia s/p seizure-like episodes.     Impression: Multiple episodes of convulsions with c/f seizures, also noted to have elevated BPs to 200s outside, in ED has been 180s-190s. Etiology of new onset seizure-like activity is unknown and patient will benefit from further workup to r/o treatable etiologies.    Recommendations:  [] Neurology working with primary team to transfer patient to [facility] EMU for quicker re-connection to vEEG. However, we have been informed this PM that no beds are available until approximately midnight 1/29/2024 - patient will not be set up on vEEG until tomorrow at [facility] (if transferred). Would recommend monitoring clinically at [facility] and set up on vEEG tomorrow (1/29) at [facility] if unable to get transferred to [facility] in a timely manner. D/w family at bedside - they are agreeable with plan but express they would like to transfer to [facility] if possible  [] Continue LEV 1000mg IVPB BID until reconnected to vEEG  [] For GTC activity >3 minutes with vital sign changes - can give lorazepam 1mg IVP, if refractory to lorazepam - can give LEV 1000mg IVP  [] MRI brain w/wo (epilepsy protocol)  [] Seizure/fall/aspiration precautions    Patient seen and d/w Dr. Lopez. Plan is formalized when attending attestation is complete.    
53F PMHx HTN presents after witnessed seizure with recurrence of episodes in the ED, Neurology following, pending MRI and on vEEG.

## 2024-02-01 NOTE — PROGRESS NOTE ADULT - PROVIDER SPECIALTY LIST ADULT
Rehab Medicine
Neurology
Internal Medicine
Neurology
Hospitalist
Internal Medicine
Hospitalist

## 2024-02-01 NOTE — PROGRESS NOTE ADULT - SUBJECTIVE AND OBJECTIVE BOX
Patient is a 53y old  Female who presents with a chief complaint of seizure (01 Feb 2024 09:34)    Interval history: patient dizzy with ambulation yesterday. Started bactrim for urinary symptoms per primary team.     REVIEW OF SYSTEMS  Constitutional - No fever, No weight loss, No fatigue  HEENT - No eye pain, No visual disturbances, No difficulty hearing, No tinnitus, No vertigo, No neck pain  Respiratory - No cough, No wheezing, No shortness of breath  Cardiovascular - No chest pain, No palpitations  Gastrointestinal - No abdominal pain, No nausea, No vomiting, No diarrhea, No constipation  Genitourinary - No dysuria, No frequency, No hematuria, No incontinence  Neurological - No headaches, No memory loss, + loss of strength, No numbness, No tremors  Skin - No itching, No rashes, No lesions   Endocrine - No temperature intolerance  Musculoskeletal - No joint pain, No joint swelling, No muscle pain  Psychiatric - No depression, No anxiety    PAST MEDICAL & SURGICAL HISTORY  Hypertension    Anxiety    Depression    No significant past surgical history        SOCIAL HISTORY  Smoking - Denied  EtOH - Denied   Drugs - Denied    FUNCTIONAL HISTORY  Lives   Independent    CURRENT FUNCTIONAL STATUS  1/31   Bed Mobility: Supine to Sit:     · Level of [place]	minimum assist (75% patients effort)  · Physical Assist/Nonphysical Assist	1 person assist; nonverbal cues (demo/gestures); verbal cues    Bed Mobility Analysis:     · Impairments Contributing to Impaired Bed Mobility	impaired balance; decreased strength    Transfer: Sit to Stand:     · Level of [place]	minimum assist (75% patients effort)  · Physical Assist/Nonphysical Assist	1 person assist; nonverbal cues (demo/gestures); verbal cues  · Weight-Bearing Restrictions	weight-bearing as tolerated  · Assistive Device	rolling walker    Transfer: Stand to Sit:     · Level of [place]	contact guard  · Physical Assist/Nonphysical Assist	1 person assist; nonverbal cues (demo/gestures); verbal cues  · Weight-Bearing Restrictions	weight-bearing as tolerated  · Assistive Device	rolling walker    Sit/Stand Transfer Safety Analysis:     · Impairments Contributing to Impaired Transfers	impaired balance; decreased strength    Gait Skills:     · Level of [place]	contact guard  · Physical Assist/Nonphysical Assist	1 person assist; nonverbal cues (demo/gestures); verbal cues  · Weight-Bearing Restrictions	weight-bearing as tolerated  · Assistive Device	rolling walker  · Gait Distance	2 steps. Limited secondary to pt. reporting room spinning dizziness. /105, HR 66 bpm. Symptoms improve with seated rest. Jerilyn RN made aware.      FAMILY HISTORY   No pertinent family history in first degree relatives        RECENT LABS/IMAGING  CBC Full  -  ( 01 Feb 2024 05:26 )  WBC Count : 10.39 K/uL  RBC Count : 4.87 M/uL  Hemoglobin : 13.6 g/dL  Hematocrit : 40.6 %  Platelet Count - Automated : 267 K/uL  Mean Cell Volume : 83.4 fL  Mean Cell Hemoglobin : 27.9 pg  Mean Cell Hemoglobin Concentration : 33.5 gm/dL  Auto Neutrophil # : x  Auto Lymphocyte # : x  Auto Monocyte # : x  Auto Eosinophil # : x  Auto Basophil # : x  Auto Neutrophil % : x  Auto Lymphocyte % : x  Auto Monocyte % : x  Auto Eosinophil % : x  Auto Basophil % : x    02-01    138  |  103  |  19  ----------------------------<  82  4.9   |  22  |  0.95    Ca    9.4      01 Feb 2024 05:26  Phos  3.7     02-01  Mg     1.90     02-01      Urinalysis Basic - ( 01 Feb 2024 05:26 )    Color: x / Appearance: x / SG: x / pH: x  Gluc: 82 mg/dL / Ketone: x  / Bili: x / Urobili: x   Blood: x / Protein: x / Nitrite: x   Leuk Esterase: x / RBC: x / WBC x   Sq Epi: x / Non Sq Epi: x / Bacteria: x        VITALS  T(C): 36.4 (02-01-24 @ 05:15), Max: 36.5 (01-31-24 @ 12:10)  HR: 76 (02-01-24 @ 05:15) (60 - 76)  BP: 123/68 (02-01-24 @ 05:15) (123/68 - 157/79)  RR: 18 (02-01-24 @ 05:15) (18 - 19)  SpO2: 100% (02-01-24 @ 05:15) (98% - 100%)  Wt(kg): --    ALLERGIES  penicillins (Anaphylaxis)  shellfish (Rash)      MEDICATIONS   acetaminophen     Tablet .. 650 milliGRAM(s) Oral every 6 hours PRN  chlorhexidine 2% Cloths 1 Application(s) Topical daily  enoxaparin Injectable 40 milliGRAM(s) SubCutaneous every 24 hours  labetalol Injectable 10 milliGRAM(s) IV Push every 6 hours PRN  LORazepam   Injectable 1 milliGRAM(s) IV Push once PRN  melatonin 3 milliGRAM(s) Oral at bedtime PRN  ondansetron Injectable 4 milliGRAM(s) IV Push every 8 hours PRN  trimethoprim  160 mG/sulfamethoxazole 800 mG 1 Tablet(s) Oral two times a day  valsartan 160 milliGRAM(s) Oral daily      ----------------------------------------------------------------------------------------  PHYSICAL EXAM  Constitutional - NAD, Comfortable  HEENT - NCAT, EOMI  Chest - no respiratory distress  Cardiovascular - RRR, S1S2  Extremities - No C/C/E, No calf tenderness   Neurologic Exam -                    Cognitive - Awake, Alert, AAO to self, place, date, year, situation     Communication - Fluent, No dysarthria     Cranial Nerves - CN 2-12 intact     Motor -                      LEFT    UE - ShAB 5/5, EF 5/5, EE 5/5, WE 5/5,  5/5                    RIGHT UE - ShAB 5/5, EF 5/5, EE 5/5, WE 5/5,  4/5                    LEFT    LE - HF 5/5, KE 5/5, DF 5/5, PF 5/5                    RIGHT LE - HF 4/5, KE 5-/5, DF 5/5, PF 5/5        Sensory - Intact to LT     Reflexes - DTR Intact      Coordination - FTN intact     OculoVestibular - No saccades, No nystagmus, VOR         Balance - WNL Static  Psychiatric - Mood stable, Affect WNL  ----------------------------------------------------------------------------------------  ASSESSMENT/PLAN  53 year old female admitted for possible seizure, 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 Patient can tolerate 3 hours per day of therapy with medical supervision.    Patient is a 53y old  Female who presents with a chief complaint of seizure (01 Feb 2024 09:34)    Interval history: patient dizzy with ambulation yesterday. Started bactrim for urinary symptoms per primary team.   states she is feeling better today, ambulated around bedside during exam today.   MRI completed.    REVIEW OF SYSTEMS  Constitutional - No fever, No weight loss, No fatigue  HEENT - No eye pain, No visual disturbances, No difficulty hearing, No tinnitus, No vertigo, No neck pain  Respiratory - No cough, No wheezing, No shortness of breath  Cardiovascular - No chest pain, No palpitations  Gastrointestinal - No abdominal pain, No nausea, No vomiting, No diarrhea, No constipation  Genitourinary - No dysuria, No frequency, No hematuria, No incontinence  Neurological - No headaches, No memory loss, no loss of strength, No numbness, No tremors, + dizzy at times  Skin - No itching, No rashes, No lesions   Endocrine - No temperature intolerance  Musculoskeletal - No joint pain, No joint swelling, No muscle pain  Psychiatric - No depression, No anxiety    PAST MEDICAL & SURGICAL HISTORY  Hypertension  Anxiety  Depression  No significant past surgical history    CURRENT FUNCTIONAL STATUS  OT note pending  patient ambulated at bedside with cg during my exam      FAMILY HISTORY   No pertinent family history in first degree relatives        RECENT LABS/IMAGING    CBC Full  -  ( 01 Feb 2024 05:26 )  WBC Count : 10.39 K/uL  RBC Count : 4.87 M/uL  Hemoglobin : 13.6 g/dL  Hematocrit : 40.6 %  Platelet Count - Automated : 267 K/uL  Mean Cell Volume : 83.4 fL  Mean Cell Hemoglobin : 27.9 pg  Mean Cell Hemoglobin Concentration : 33.5 gm/dL  Auto Neutrophil # : x  Auto Lymphocyte # : x  Auto Monocyte # : x  Auto Eosinophil # : x  Auto Basophil # : x  Auto Neutrophil % : x  Auto Lymphocyte % : x  Auto Monocyte % : x  Auto Eosinophil % : x  Auto Basophil % : x    02-01    138  |  103  |  19  ----------------------------<  82  4.9   |  22  |  0.95    Ca    9.4      01 Feb 2024 05:26  Phos  3.7     02-01  Mg     1.90     02-01      Urinalysis Basic - ( 01 Feb 2024 05:26 )    Color: x / Appearance: x / SG: x / pH: x  Gluc: 82 mg/dL / Ketone: x  / Bili: x / Urobili: x   Blood: x / Protein: x / Nitrite: x   Leuk Esterase: x / RBC: x / WBC x   Sq Epi: x / Non Sq Epi: x / Bacteria: x        VITALS  T(C): 36.4 (02-01-24 @ 05:15), Max: 36.5 (01-31-24 @ 12:10)  HR: 76 (02-01-24 @ 05:15) (60 - 76)  BP: 123/68 (02-01-24 @ 05:15) (123/68 - 157/79)  RR: 18 (02-01-24 @ 05:15) (18 - 19)  SpO2: 100% (02-01-24 @ 05:15) (98% - 100%)  Wt(kg): --    ALLERGIES  penicillins (Anaphylaxis)  shellfish (Rash)      MEDICATIONS   acetaminophen     Tablet .. 650 milliGRAM(s) Oral every 6 hours PRN  chlorhexidine 2% Cloths 1 Application(s) Topical daily  enoxaparin Injectable 40 milliGRAM(s) SubCutaneous every 24 hours  labetalol Injectable 10 milliGRAM(s) IV Push every 6 hours PRN  LORazepam   Injectable 1 milliGRAM(s) IV Push once PRN  melatonin 3 milliGRAM(s) Oral at bedtime PRN  ondansetron Injectable 4 milliGRAM(s) IV Push every 8 hours PRN  trimethoprim  160 mG/sulfamethoxazole 800 mG 1 Tablet(s) Oral two times a day  valsartan 160 milliGRAM(s) Oral daily      ----------------------------------------------------------------------------------------  PHYSICAL EXAM  Constitutional - NAD, Comfortable  HEENT - NCAT, EOMI  Chest - no respiratory distress  Cardiovascular - RRR, S1S2  Extremities - No C/C/E, No calf tenderness   Neurologic Exam -                    Cognitive - Awake, Alert, AAO to self, place, date, year, situation     Communication - Fluent, No dysarthria     Cranial Nerves - CN 2-12 intact     Motor -                      LEFT    UE - ShAB 5/5, EF 5/5, EE 5/5, WE 5/5,  5/5                    RIGHT UE - ShAB 5/5, EF 5/5, EE 5/5, WE 5/5,  4/5                    LEFT    LE - HF 5/5, KE 5/5, DF 5/5, PF 5/5                    RIGHT LE - HF 5/5, KE 5/5, DF 5/5, PF 5/5        Sensory - Intact to LT      Balance - WNL Static  Psychiatric - Mood stable, Affect WNL  ----------------------------------------------------------------------------------------  ASSESSMENT/PLAN  53 year old female admitted for possible seizure, 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

## 2024-02-01 NOTE — DISCHARGE NOTE NURSING/CASE MANAGEMENT/SOCIAL WORK - NSDCPEFALRISK_GEN_ALL_CORE
For information on Fall & Injury Prevention, visit: [email or web address] OR  [email or web address] OR  https://www.cdc.gov/steadi/patient.html

## 2024-02-01 NOTE — OCCUPATIONAL THERAPY INITIAL EVALUATION ADULT - GENERAL OBSERVATIONS, REHAB EVAL
Upon entry, patient seated in chair at bedside, patient agreeable to OT eval, cleared for OT evaluation as per RN. Patient left seated in chair at bedside, call bell in reach, all lines/tubes intact, vitals stable, NAD.

## 2024-02-01 NOTE — PHARMACOTHERAPY INTERVENTION NOTE - COMMENTS
Potential discharge medications were reviewed with the patient. Current medication schedule was discussed in detail including: medication name, indication, dose, administration times, treatment duration of antibiotic, side effects, and special instructions. All questions and concerns were answered and addressed. Patient verbalized understanding and was provided with educational handout.    Gillian Odom, PharmD  Clinical Pharmacy Specialist  [facility] [id number]

## 2024-02-01 NOTE — PROGRESS NOTE ADULT - PROBLEM SELECTOR PLAN 4
Home DVT ppx: Lovenox  Disposition, pending  MRI DVT ppx: Lovenox  Anticipate dc planning 33mins  pending  MRI read, UCx sensitivities. Pt and daughter updated.

## 2024-02-01 NOTE — OCCUPATIONAL THERAPY INITIAL EVALUATION ADULT - PERTINENT HX OF CURRENT PROBLEM, REHAB EVAL
Patient is a 53 year old female, presents after witnessed seizure with recurrence of episodes. , CT head/perfusion: no evidence of significant stenosis, occlusion or dissection bilateral extracranial carotid arteries. No evidence of large vessel occlusion, definitive aneurysm or vascular malformation intracranial circulation. EEG shows no epileptiform abnormalities or electrographic seizure.

## 2024-02-01 NOTE — DISCHARGE NOTE NURSING/CASE MANAGEMENT/SOCIAL WORK - PATIENT PORTAL LINK FT
You can access the FollowMyHealth Patient Portal offered by [facility] by registering at the following website: [email or web address]. By joining [employer]’s FollowMyHealth portal, you will also be able to view your health information using other applications (apps) compatible with our system.

## 2024-02-01 NOTE — DISCHARGE NOTE NURSING/CASE MANAGEMENT/SOCIAL WORK - NSDCFUADDAPPT_GEN_ALL_CORE_FT
F/u with: [facility]  [address], [facility], First Floor, [place] [id number] [phone]

## 2024-02-01 NOTE — PROGRESS NOTE ADULT - PROBLEM SELECTOR PROBLEM 3
Multiple thyroid nodules

## 2024-02-01 NOTE — OCCUPATIONAL THERAPY INITIAL EVALUATION ADULT - LIVES WITH, PROFILE
Lives with  in a private home, ~5 steps to manage to basement where they reside, railing on one side/spouse

## 2024-02-01 NOTE — PROGRESS NOTE ADULT - PROBLEM SELECTOR PLAN 1
Episode at work, followed by recurrent episodes of seizure like activity in the ED s/p ativan 4mg x1 for recurrent seizure   CT head/perfusion as above: No evidence of significant stenosis, occlusion or dissection bilateral extracranial carotid arteries. No evidence of large vessel occlusion, definitive aneurysm or vascular   malformation intracranial circulation.  Appreciate Neurology recs -  Multiple episodes of convulsions with c/f seizures, also noted to have elevated BPs to 200s outside, in ED has been 180s-190s. Etiology of new onset seizure-like activity is unknown and patient will benefit from further workup to r/o treatable etiologies.  s/p Keppra load followed by keppra 1gm BID  EEG shows no epileptiform abnormalities or electrographic seizure.  Appreciate Neuro recs- Advise to dc keppra as no seizures noted on EEG.  FU  MRI brain w/wo (epilepsy protocol) as family insisting pt gets it done inpatient. Advise Psych eval. As per neuro- no restriction to driving  Pt was seen by Psych on 1/31-defer medications at this time, may benefit from counseling, resources as below. F/u with: [facility] [address] [facility], [address], [place] [address] Episode at work, followed by recurrent episodes of seizure like activity in the ED s/p ativan 4mg x1 for recurrent seizure   CT head/perfusion as above: No evidence of significant stenosis, occlusion or dissection bilateral extracranial carotid arteries. No evidence of large vessel occlusion, definitive aneurysm or vascular   malformation intracranial circulation.  Appreciate Neurology recs -  Multiple episodes of convulsions with c/f seizures, also noted to have elevated BPs to 200s outside, in ED has been 180s-190s. Etiology of new onset seizure-like activity is unknown and patient will benefit from further workup to r/o treatable etiologies.  s/p Keppra load followed by keppra 1gm BID  EEG shows no epileptiform abnormalities or electrographic seizure. FU MRI   Appreciate Neuro recs- Advise to dc keppra as no seizures noted on EEG.  FU  MRI brain w/wo (epilepsy protocol) as family insisting pt gets it done inpatient. Advise Psych eval. As per neuro- no restriction to driving  Pt was seen by Psych on 1/31-defer medications at this time, may benefit from counseling, resources as below. F/u with: [facility] [address] [facility], [address] Floor, [place] [address] Episode at work, followed by recurrent episodes of seizure like activity in the ED s/p ativan 4mg x1 for recurrent seizure   CT head/perfusion as above: No evidence of significant stenosis, occlusion or dissection bilateral extracranial carotid arteries. No evidence of large vessel occlusion, definitive aneurysm or vascular   malformation intracranial circulation.  Appreciate Neurology recs -  Multiple episodes of convulsions with c/f seizures, also noted to have elevated BPs to 200s outside, in ED has been 180s-190s. Etiology of new onset seizure-like activity is unknown and patient will benefit from further workup to r/o treatable etiologies.  s/p Keppra load followed by keppra 1gm BID  EEG shows no epileptiform abnormalities or electrographic seizure. MRI- No acute infarct, hemorrhage, or mass.  No potential seizure focus identified. Chronic white matter small vessel changes.  Appreciate Neuro recs- Advise to dc keppra as no seizures noted on EEG. As per neuro- no restriction to driving  Advise Psych eval. Pt was seen by Psych on 1/31-defer medications at this time, may benefit from counseling, resources as below. F/u with: [facility] [address] [facility], First Floor, [place] [address]

## 2024-02-01 NOTE — PROGRESS NOTE ADULT - SUBJECTIVE AND OBJECTIVE BOX
[facility]  Hospitalist  Pager- [phone]      LIZZIE RAHMAN, MRN-[id number]    Patient is a 53y old  Female who presents with a chief complaint of seizure (28 Jan 2024 12:27)    OVERNIGHT EVENTS/INTERVAL/SUBJECTIVE:  Pt seen and examined by me.   DW Staff- had an episode of Vomiting last night with chest tightness. troponin checked and was negative  Ambulated this AM     CONSTITUTIONAL: No weakness. No fatigue. No fever.  HEAD: No head trauma.   EYES: No vision changes.  ENT: No hearing changes or tinnitus. No ear pain. No changes in smell. No nasal congestion or discharge. No sore throat. No voice hoarseness.   NECK: No neck pain or stiffness. No lumps.  RESPIRATORY: No cough. No SOB. No wheezing. No hemoptysis.   CARDIOVASCULAR: No chest pain. No palpitations.   GASTROINTESTINAL: No dysphagia. No ABD pain. No distension. No constipation. No diarrhea. No pain with defecation. No hematemesis. No hematochezia or melena.  BACK: No back pain.  GENITOURINARY: No dysuria. No frequency or urgency. No hesitancy. No incontinence. No urinary retention. No suprapubic pain. No hematuria.  EXTREMITY: No swelling.  MUSCULOSKELETAL: No joint pain or swelling. No fractures. No stiffness.    SKIN: No rashes. No itching. No skin, hair, or nail changes.  NEUROLOGICAL: No weakness or paralysis. No lightheadedness or dizziness. No HA. No numbness or tingling.   PSYCHIATRIC: No depression.       Vital Signs Last 24 Hrs  T(C): 36.4 (31 Jan 2024 05:30), Max: 36.7 (30 Jan 2024 20:07)  T(F): 97.6 (31 Jan 2024 05:30), Max: 98 (30 Jan 2024 20:07)  HR: 65 (31 Jan 2024 05:30) (65 - 67)  BP: 136/79 (31 Jan 2024 05:30) (129/87 - 144/91)  BP(mean): 69 (30 Jan 2024 20:07) (69 - 69)  RR: 19 (31 Jan 2024 05:30) (18 - 19)  SpO2: 100% (31 Jan 2024 05:30) (98% - 100%)    Parameters below as of 31 Jan 2024 05:30  Patient On (Oxygen Delivery Method): room air        MEDICATIONS  (STANDING):  chlorhexidine 2% Cloths 1 Application(s) Topical daily  enoxaparin Injectable 40 milliGRAM(s) SubCutaneous every 24 hours  valsartan 160 milliGRAM(s) Oral daily    MEDICATIONS  (PRN):  acetaminophen     Tablet .. 650 milliGRAM(s) Oral every 6 hours PRN Temp greater or equal to 38C (100.4F), Mild Pain (1 - 3)  labetalol Injectable 10 milliGRAM(s) IV Push every 6 hours PRN SBP >170  LORazepam   Injectable 1 milliGRAM(s) IV Push once PRN > 3minutes of general tonic clonic seizure activity  melatonin 3 milliGRAM(s) Oral at bedtime PRN Insomnia    CONSTITUTIONAL: No acute distress. Somnolent, intermittently arousable.  RESPIRATORY: CTAB. No wheezes, rales, or rhonchi. No accessory muscle use. No apparent respiratory distress.  CARDIOVASCULAR: +S1/S2. No audible S3/S4. Regular rate and rhythm. No murmurs, rubs, or gallops. No LE swelling or edema.  GASTROINTESTINAL: Soft, nontender, nondistended. +BS. No rebound or guarding.   MUSCULOSKELETAL: Very sparse movements.  NEUROLOGICAL: Limited assessment due to mental status.  PSYCHIATRIC: Somnolent affect. A&Ox3.      LABS                                                 13.9   5.00  )-----------( 284      ( 31 Jan 2024 06:50 )             42.7     01-31    139  |  104  |  17  ----------------------------<  82  4.6   |  24  |  0.89    Ca    9.6      31 Jan 2024 06:50  Phos  4.2     01-31  Mg     2.10     01-31        CAPILLARY BLOOD GLUCOSE        Urinalysis Basic - ( 28 Jan 2024 05:18 )    Color: x / Appearance: x / SG: x / pH: x  Gluc: 88 mg/dL / Ketone: x  / Bili: x / Urobili: x   Blood: x / Protein: x / Nitrite: x   Leuk Esterase: x / RBC: x / WBC x   Sq Epi: x / Non Sq Epi: x / Bacteria: x        RADIOLOGY AND ADDITIONAL TESTS:    CONSULTANT NOTES REVIEWED: Y    CARE DISCUSSED WITH THE FOLLOWING CONSULTANTS/PROVIDERS: [facility]  Hospitalist  Pager- [phone]      LIZZIE RAHMAN, MRN-[id number]    Patient is a 53y old  Female who presents with a chief complaint of seizure (28 Jan 2024 12:27)    OVERNIGHT EVENTS/INTERVAL/SUBJECTIVE:  Pt seen and examined by me.   DW Staff- had an episode of Vomiting last night with chest tightness. troponin checked and was negative  Ambulated this AM, Just got back from MRI.  Pt reports she is feeling very well this AM- Denies N/V/Abdominal. Shares she had some chest tightness yesterday evening when she had an episode of coughing. Chest pain resolved    CONSTITUTIONAL: No weakness. No fatigue. No fever.  HEAD: No head trauma.   EYES: No vision changes.  ENT: No hearing changes or tinnitus. No ear pain. No changes in smell. No nasal congestion or discharge. No sore throat. No voice hoarseness.   NECK: No neck pain or stiffness. No lumps.  RESPIRATORY: No cough. No SOB. No wheezing. No hemoptysis.   CARDIOVASCULAR: No chest pain. No palpitations.   GASTROINTESTINAL: No dysphagia. No ABD pain. No distension. No constipation. No diarrhea. No pain with defecation. No hematemesis. No hematochezia or melena.  BACK: No back pain.  GENITOURINARY: No dysuria. No frequency or urgency. No hesitancy. No incontinence. No urinary retention. No suprapubic pain. No hematuria.  EXTREMITY: No swelling.  MUSCULOSKELETAL: No joint pain or swelling. No fractures. No stiffness.    SKIN: No rashes. No itching. No skin, hair, or nail changes.  NEUROLOGICAL: No weakness or paralysis. No lightheadedness or dizziness. No HA. No numbness or tingling.   PSYCHIATRIC: No depression.       Vital Signs Last 24 Hrs  T(C): 36.4 (31 Jan 2024 05:30), Max: 36.7 (30 Jan 2024 20:07)  T(F): 97.6 (31 Jan 2024 05:30), Max: 98 (30 Jan 2024 20:07)  HR: 65 (31 Jan 2024 05:30) (65 - 67)  BP: 136/79 (31 Jan 2024 05:30) (129/87 - 144/91)  BP(mean): 69 (30 Jan 2024 20:07) (69 - 69)  RR: 19 (31 Jan 2024 05:30) (18 - 19)  SpO2: 100% (31 Jan 2024 05:30) (98% - 100%)    Parameters below as of 31 Jan 2024 05:30  Patient On (Oxygen Delivery Method): room air        MEDICATIONS  (STANDING):  chlorhexidine 2% Cloths 1 Application(s) Topical daily  enoxaparin Injectable 40 milliGRAM(s) SubCutaneous every 24 hours  valsartan 160 milliGRAM(s) Oral daily    MEDICATIONS  (PRN):  acetaminophen     Tablet .. 650 milliGRAM(s) Oral every 6 hours PRN Temp greater or equal to 38C (100.4F), Mild Pain (1 - 3)  labetalol Injectable 10 milliGRAM(s) IV Push every 6 hours PRN SBP >170  LORazepam   Injectable 1 milliGRAM(s) IV Push once PRN > 3minutes of general tonic clonic seizure activity  melatonin 3 milliGRAM(s) Oral at bedtime PRN Insomnia    CONSTITUTIONAL: No acute distress. Somnolent, intermittently arousable.  RESPIRATORY: CTAB. No wheezes, rales, or rhonchi. No accessory muscle use. No apparent respiratory distress.  CARDIOVASCULAR: +S1/S2. No audible S3/S4. Regular rate and rhythm. No murmurs, rubs, or gallops. No LE swelling or edema.  GASTROINTESTINAL: Soft, nontender, nondistended. +BS. No rebound or guarding.   MUSCULOSKELETAL: Very sparse movements.  NEUROLOGICAL: Limited assessment due to mental status.  PSYCHIATRIC: Somnolent affect. A&Ox3.      LABS                                                 13.9   5.00  )-----------( 284      ( 31 Jan 2024 06:50 )             42.7     01-31    139  |  104  |  17  ----------------------------<  82  4.6   |  24  |  0.89    Ca    9.6      31 Jan 2024 06:50  Phos  4.2     01-31  Mg     2.10     01-31        CAPILLARY BLOOD GLUCOSE        Urinalysis Basic - ( 28 Jan 2024 05:18 )    Color: x / Appearance: x / SG: x / pH: x  Gluc: 88 mg/dL / Ketone: x  / Bili: x / Urobili: x   Blood: x / Protein: x / Nitrite: x   Leuk Esterase: x / RBC: x / WBC x   Sq Epi: x / Non Sq Epi: x / Bacteria: x        RADIOLOGY AND ADDITIONAL TESTS:    CONSULTANT NOTES REVIEWED: Y    CARE DISCUSSED WITH THE FOLLOWING CONSULTANTS/PROVIDERS:

## 2024-02-01 NOTE — PROGRESS NOTE ADULT - PROBLEM SELECTOR PLAN 2
Home medication: amlodipine-valsartan   Hypertense in the ED - likely transiently from seizure? vs hypertensive emergency  SBP increased to 186/113 during the episode, decreased on its own. SBP in 140s  C/w valsartan 160, c/w labetalol PRN  If persistently hypertensive, can add back amlodipine    #Abnormal UA- pt asymptomatic. Denies dysuria or frequency. pt endorses frequent UTI which she self treats with cranberry juice. Advised pt to FU with PMD for repeat UA in 2-3 weeks as UA with small blood. pt agreeable Home medication: amlodipine-valsartan   Hypertense in the ED - likely transiently from seizure? vs hypertensive emergency  SBP increased to 186/113 during the episode, decreased on its own. SBP in 140s  C/w valsartan 160, c/w labetalol PRN  If persistently hypertensive, can add back amlodipine    #Abnormal UA- pt asymptomatic. UC - Ecoli   Pt endorses frequent UTI which she self treats with cranberry juice.  Started on Bactrim.  Advised pt to FU with PMD for repeat UA in 2-3 weeks as UA with small blood. pt agreeable Home medication: amlodipine-valsartan   Hypertense in the ED - likely transiently from seizure? vs hypertensive emergency  SBP increased to 186/113 during the episode, decreased on its own. SBP in 140s  C/w valsartan 160, c/w labetalol PRN  If persistently hypertensive, can add back amlodipine    #Abnormal UA- pt asymptomatic. UC - Ecoli   Pt endorses frequent UTI which she self treats with cranberry juice.  Started on Bactrim. FU Sx  Advised pt to FU with PMD for repeat UA in 2-3 weeks as UA with small blood. pt agreeable

## 2024-02-02 LAB
-  AMOXICILLIN/CLAVULANIC ACID: SIGNIFICANT CHANGE UP
-  AMPICILLIN/SULBACTAM: SIGNIFICANT CHANGE UP
-  AMPICILLIN: SIGNIFICANT CHANGE UP
-  AZTREONAM: SIGNIFICANT CHANGE UP
-  CEFAZOLIN: SIGNIFICANT CHANGE UP
-  CEFEPIME: SIGNIFICANT CHANGE UP
-  CEFOXITIN: SIGNIFICANT CHANGE UP
-  CEFTRIAXONE: SIGNIFICANT CHANGE UP
-  CEFUROXIME: SIGNIFICANT CHANGE UP
-  CIPROFLOXACIN: SIGNIFICANT CHANGE UP
-  ERTAPENEM: SIGNIFICANT CHANGE UP
-  GENTAMICIN: SIGNIFICANT CHANGE UP
-  IMIPENEM: SIGNIFICANT CHANGE UP
-  LEVOFLOXACIN: SIGNIFICANT CHANGE UP
-  MEROPENEM: SIGNIFICANT CHANGE UP
-  NITROFURANTOIN: SIGNIFICANT CHANGE UP
-  PIPERACILLIN/TAZOBACTAM: SIGNIFICANT CHANGE UP
-  TOBRAMYCIN: SIGNIFICANT CHANGE UP
-  TRIMETHOPRIM/SULFAMETHOXAZOLE: SIGNIFICANT CHANGE UP
CULTURE RESULTS: ABNORMAL
METHOD TYPE: SIGNIFICANT CHANGE UP
ORGANISM # SPEC MICROSCOPIC CNT: ABNORMAL
ORGANISM # SPEC MICROSCOPIC CNT: ABNORMAL
SPECIMEN SOURCE: SIGNIFICANT CHANGE UP

## 2024-02-06 ENCOUNTER — NON-APPOINTMENT (OUTPATIENT)
Age: 54
End: 2024-02-06

## 2024-02-09 ENCOUNTER — APPOINTMENT (OUTPATIENT)
Dept: INTERNAL MEDICINE | Facility: CLINIC | Age: 54
End: 2024-02-09
Payer: COMMERCIAL

## 2024-02-09 ENCOUNTER — TRANSCRIPTION ENCOUNTER (OUTPATIENT)
Age: 54
End: 2024-02-09

## 2024-02-09 VITALS
OXYGEN SATURATION: 99 % | BODY MASS INDEX: 33.13 KG/M2 | TEMPERATURE: 97 F | SYSTOLIC BLOOD PRESSURE: 160 MMHG | HEART RATE: 73 BPM | WEIGHT: 180 LBS | HEIGHT: 62 IN | DIASTOLIC BLOOD PRESSURE: 86 MMHG

## 2024-02-09 PROCEDURE — 99495 TRANSJ CARE MGMT MOD F2F 14D: CPT

## 2024-02-09 NOTE — PLAN
[FreeTextEntry1] : Patient admitted for seizures  - had extensive work-up including CTA, EEG and MRI brain without active episodes.  No further episodes since discharge. Had total of 5-7 episodes while hospitalized - described as generalized tonic-clonic seizures. Only    Per patient - she had initial episode at [facility] while working and then had multiple convulsive episodes in ED and during hospitalization. Patient reports that she admittedly was not checking her blood pressure regularly - and was intermittently compliant with her medications.   Of note, UA positive and UC grew E. coli - for which she was prescribed bactrim - has since finished course of abx.  #HFU #New onset seizures  Neurology eval pending   #Chest pain  Saw cardiology at [facility] for new onset chest pain - currently wearing heart monitor  Eval pending

## 2024-02-09 NOTE — HISTORY OF PRESENT ILLNESS
[Post-hospitalization from ___ Hospital] : Post-hospitalization from [unfilled] Hospital [Admitted on: ___] : The patient was admitted on [unfilled] [Discharged on ___] : discharged on [unfilled] [Discharge Summary] : discharge summary [Radiology Findings] : radiology findings [Pertinent Labs] : pertinent labs [Discharge Med List] : discharge medication list [Med Reconciliation] : medication reconciliation has been completed [Patient Contacted By: ____] : and contacted by [unfilled] [FreeTextEntry2] : Patient admitted for seizures  - had extensive work-up including CTA, EEG and MRI brain without active episodes.  No further episodes since discharge. Had total of 5-7 episodes while hospitalized - described as generalized tonic-clonic seizures. Only    Per patient - she had initial episode at [facility] while working and then had multiple convulsive episodes in ED and during hospitalization. Patient reports that she admittedly was not checking her blood pressure regularly - and was intermittently compliant with her medications.   Of note, UA positive and UC grew E. coli - for which she was prescribed bactrim - has since finished course of abx.     Discharge Date 01-Feb-2024 Admission Date 26-Jan-2024 13:37 Reason for Admission seizure Hospital Course  53-year-old with past medical history of hypertension, prediabetes, vitamin D deficiency, ?per outpatient records hyperlipidemia ( (LDL 93 11/2022) presenting as a code stroke for AMS + aphasia s/p seizures. Per history from EMS, was working at [employer] when was witnessed by staff to have generalized convulsions lasting roughly 2 minutes, at the time BP reportedly in 240s. Upon arrival of EMS, witnessed to have another episode 30 seconds of convulsions no incontinence noted, . On presentation, /116, noted to have another episode of convulsions, head turned briefly to left lasting less than 30 seconds in duration by ED + neuro team. NIHSS: 26 -> 15 upon re-evaluation following imaging Upon re-evaluation at bedside after imaging patient able to follow simple commands raise UE antigravity symmetrically antigravity for several seconds. No recent infections/illnesses, recent injuries, recent travel, recent reported changes in medications per spouse. pt was admitted to EMU to evaluate for seizures.  # Concern for seizures- Episode at work, followed by recurrent episodes of seizure like activity in the ED s/p ativan 4mg x1 for recurrent seizure CT head/perfusion as above: No evidence of significant stenosis, occlusion or dissection bilateral extracranial carotid arteries. No evidence of large vessel occlusion, definitive aneurysm or vascular malformation intracranial circulation. Appreciate Neurology recs -  Multiple episodes of convulsions with c/f seizures, also noted to have elevated BPs to 200s outside, in ED has been 180s-190s. Etiology of new onset seizure-like activity is unknown and patient will benefit from further workup to r/o treatable etiologies. s/p Keppra load followed by keppra 1gm BID. EEG shows no epileptiform abnormalities or electrographic seizure. MRI- No acute infarct, hemorrhage, or mass. No potential seizure focus identified. Chronic white matter small vessel changes.Appreciate Neuro recs- Advise to dc keppra as no seizures noted on EEG. As per neuro- no restriction to driving. Advise Psych eval. Pt was seen by Psych on 1/31-defer medications at this time, may benefit from counseling, resources as below. F[facility] with: [facility] [id number] [facility], [address], [place] [address].   # Hypertensive urgency-Home medication: amlodipine-valsartan . Hypertense in the ED - likely transiently from seizure? vs hypertensive emergency SBP increased to 186/113 during the episode, decreased on its own. SBP in 140s. C/w valsartan 160, c/w labetalol PRN If persistently hypertensive, can add back amlodipine  #UTI- Large LE. UC- Ecoli. Started on Bactrim as pt with PCN allergy.  # Multiple thyroid nodules-  Plan: Seen on CT. TSH 2.33. OP thyroid US.  Med Reconciliation: Override IMPROVE-DD recommendations due to: IMPROVE-DD Application Not Available Recommended Post-Discharge VTE Prophylaxis IMPROVE-DD Application Not Available Medication Reconciliation Status Admission Reconciliation is Completed Discharge Reconciliation is Completed Discharge Medications sulfamethoxazole-trimethoprim 800 mg-160 mg oral tablet: 1 tab(s) orally 2 times a day valsartan 160 mg oral tablet: 1 tab(s) orally once a day , , Care Plan/Procedures: Discharge Diagnoses, Assessment and Plan of Treatment PRINCIPAL DISCHARGE DIAGNOSIS Diagnosis: Seizure Assessment and Plan of Treatment: you had an episode at work, followed by recurrent episodes of seizure like activity in the ED.  You received ativan for recurrent seizure Your CT head/perfusion showed no evidence of significant stenosis, occlusion or dissection bilateral extracranial carotid arteries. No evidence of large vessel occlusion, definitive aneurysm or vascular malformation intracranial circulation. You receievd a Keppra load followed by keppra 1gm BID. EEG showed no epileptiform abnormalities or electrographic seizure and Keppra was discontinued    SECONDARY DISCHARGE DIAGNOSES Diagnosis: HTN (hypertension) Assessment and Plan of Treatment: Your blood pressure was elevated, mostly when you were having shaking. Please take your meds as prescribed  Diagnosis: Multiple thyroid nodules Assessment and Plan of Treatment: Multiple thyroid nodules were seen on CT. Your  TSH was WNL.  Please follow up with your PMD for thyroid US. Discharge Procedures, Findings and Treatment PRINCIPAL PROCEDURE Procedure: MR brain Findings and Treatment: Your MRI brain revealed: No acute infarct, hemorrhage, or mass. No potential seizure focus identified. Chronic white matter small vessel changes.

## 2024-02-11 ENCOUNTER — INPATIENT (INPATIENT)
Facility: HOSPITAL | Age: 54
LOS: 2 days | Discharge: HOME | DRG: 552 | End: 2024-02-14
Attending: STUDENT IN AN ORGANIZED HEALTH CARE EDUCATION/TRAINING PROGRAM | Admitting: HOSPITALIST
Payer: COMMERCIAL

## 2024-02-11 VITALS
SYSTOLIC BLOOD PRESSURE: 177 MMHG | OXYGEN SATURATION: 98 % | WEIGHT: 149.91 LBS | HEART RATE: 64 BPM | DIASTOLIC BLOOD PRESSURE: 97 MMHG | HEIGHT: 63 IN | RESPIRATION RATE: 16 BRPM | TEMPERATURE: 98 F

## 2024-02-11 DIAGNOSIS — R53.1 WEAKNESS: ICD-10-CM

## 2024-02-11 LAB
ALBUMIN SERPL ELPH-MCNC: 3.7 G/DL — SIGNIFICANT CHANGE UP (ref 3.3–5)
ALP SERPL-CCNC: 89 U/L — SIGNIFICANT CHANGE UP (ref 40–120)
ALT FLD-CCNC: 22 U/L — SIGNIFICANT CHANGE UP (ref 10–45)
ANION GAP SERPL CALC-SCNC: 9 MMOL/L — SIGNIFICANT CHANGE UP (ref 5–17)
APPEARANCE UR: CLEAR — SIGNIFICANT CHANGE UP
AST SERPL-CCNC: 34 U/L — SIGNIFICANT CHANGE UP (ref 10–40)
BACTERIA # UR AUTO: NEGATIVE /HPF — SIGNIFICANT CHANGE UP
BASOPHILS # BLD AUTO: 0.04 K/UL — SIGNIFICANT CHANGE UP (ref 0–0.2)
BASOPHILS NFR BLD AUTO: 0.7 % — SIGNIFICANT CHANGE UP (ref 0–2)
BILIRUB SERPL-MCNC: 0.2 MG/DL — SIGNIFICANT CHANGE UP (ref 0.2–1.2)
BILIRUB UR-MCNC: NEGATIVE — SIGNIFICANT CHANGE UP
BUN SERPL-MCNC: 9 MG/DL — SIGNIFICANT CHANGE UP (ref 7–23)
CALCIUM SERPL-MCNC: 9.3 MG/DL — SIGNIFICANT CHANGE UP (ref 8.4–10.5)
CAST: 0 /LPF — SIGNIFICANT CHANGE UP (ref 0–4)
CHLORIDE SERPL-SCNC: 107 MMOL/L — SIGNIFICANT CHANGE UP (ref 96–108)
CO2 SERPL-SCNC: 23 MMOL/L — SIGNIFICANT CHANGE UP (ref 22–31)
COLOR SPEC: YELLOW — SIGNIFICANT CHANGE UP
CREAT SERPL-MCNC: 0.72 MG/DL — SIGNIFICANT CHANGE UP (ref 0.5–1.3)
DIFF PNL FLD: ABNORMAL
EGFR: 101 ML/MIN/1.73M2 — SIGNIFICANT CHANGE UP
EOSINOPHIL # BLD AUTO: 0.02 K/UL — SIGNIFICANT CHANGE UP (ref 0–0.5)
EOSINOPHIL NFR BLD AUTO: 0.4 % — SIGNIFICANT CHANGE UP (ref 0–6)
FLUAV AG NPH QL: SIGNIFICANT CHANGE UP
FLUBV AG NPH QL: SIGNIFICANT CHANGE UP
GLUCOSE SERPL-MCNC: 97 MG/DL — SIGNIFICANT CHANGE UP (ref 70–99)
GLUCOSE UR QL: NEGATIVE MG/DL — SIGNIFICANT CHANGE UP
HCT VFR BLD CALC: 41.5 % — SIGNIFICANT CHANGE UP (ref 34.5–45)
HGB BLD-MCNC: 13.2 G/DL — SIGNIFICANT CHANGE UP (ref 11.5–15.5)
IMM GRANULOCYTES NFR BLD AUTO: 0.2 % — SIGNIFICANT CHANGE UP (ref 0–0.9)
KETONES UR-MCNC: NEGATIVE MG/DL — SIGNIFICANT CHANGE UP
LEUKOCYTE ESTERASE UR-ACNC: NEGATIVE — SIGNIFICANT CHANGE UP
LYMPHOCYTES # BLD AUTO: 2.79 K/UL — SIGNIFICANT CHANGE UP (ref 1–3.3)
LYMPHOCYTES # BLD AUTO: 49.1 % — HIGH (ref 13–44)
MAGNESIUM SERPL-MCNC: 2 MG/DL — SIGNIFICANT CHANGE UP (ref 1.6–2.6)
MCHC RBC-ENTMCNC: 27.3 PG — SIGNIFICANT CHANGE UP (ref 27–34)
MCHC RBC-ENTMCNC: 31.8 GM/DL — LOW (ref 32–36)
MCV RBC AUTO: 85.9 FL — SIGNIFICANT CHANGE UP (ref 80–100)
MONOCYTES # BLD AUTO: 0.32 K/UL — SIGNIFICANT CHANGE UP (ref 0–0.9)
MONOCYTES NFR BLD AUTO: 5.6 % — SIGNIFICANT CHANGE UP (ref 2–14)
NEUTROPHILS # BLD AUTO: 2.5 K/UL — SIGNIFICANT CHANGE UP (ref 1.8–7.4)
NEUTROPHILS NFR BLD AUTO: 44 % — SIGNIFICANT CHANGE UP (ref 43–77)
NITRITE UR-MCNC: NEGATIVE — SIGNIFICANT CHANGE UP
NRBC # BLD: 0 /100 WBCS — SIGNIFICANT CHANGE UP (ref 0–0)
PH UR: 7.5 — SIGNIFICANT CHANGE UP (ref 5–8)
PHOSPHATE SERPL-MCNC: 3.2 MG/DL — SIGNIFICANT CHANGE UP (ref 2.5–4.5)
PLATELET # BLD AUTO: 313 K/UL — SIGNIFICANT CHANGE UP (ref 150–400)
POTASSIUM SERPL-MCNC: 6.8 MMOL/L — CRITICAL HIGH (ref 3.5–5.3)
POTASSIUM SERPL-SCNC: 6.8 MMOL/L — CRITICAL HIGH (ref 3.5–5.3)
PROT SERPL-MCNC: 7.6 G/DL — SIGNIFICANT CHANGE UP (ref 6–8.3)
PROT UR-MCNC: NEGATIVE MG/DL — SIGNIFICANT CHANGE UP
RBC # BLD: 4.83 M/UL — SIGNIFICANT CHANGE UP (ref 3.8–5.2)
RBC # FLD: 13.1 % — SIGNIFICANT CHANGE UP (ref 10.3–14.5)
RBC CASTS # UR COMP ASSIST: 3 /HPF — SIGNIFICANT CHANGE UP (ref 0–4)
RSV RNA NPH QL NAA+NON-PROBE: SIGNIFICANT CHANGE UP
SARS-COV-2 RNA SPEC QL NAA+PROBE: SIGNIFICANT CHANGE UP
SODIUM SERPL-SCNC: 139 MMOL/L — SIGNIFICANT CHANGE UP (ref 135–145)
SP GR SPEC: 1.01 — SIGNIFICANT CHANGE UP (ref 1–1.03)
SQUAMOUS # UR AUTO: 0 /HPF — SIGNIFICANT CHANGE UP (ref 0–5)
TROPONIN T, HIGH SENSITIVITY RESULT: <6 NG/L — SIGNIFICANT CHANGE UP (ref 0–51)
TROPONIN T, HIGH SENSITIVITY RESULT: <6 NG/L — SIGNIFICANT CHANGE UP (ref 0–51)
UROBILINOGEN FLD QL: 0.2 MG/DL — SIGNIFICANT CHANGE UP (ref 0.2–1)
WBC # BLD: 5.68 K/UL — SIGNIFICANT CHANGE UP (ref 3.8–10.5)
WBC # FLD AUTO: 5.68 K/UL — SIGNIFICANT CHANGE UP (ref 3.8–10.5)
WBC UR QL: 0 /HPF — SIGNIFICANT CHANGE UP (ref 0–5)

## 2024-02-11 PROCEDURE — 99285 EMERGENCY DEPT VISIT HI MDM: CPT

## 2024-02-11 PROCEDURE — 70498 CT ANGIOGRAPHY NECK: CPT | Mod: 26,MA

## 2024-02-11 PROCEDURE — 70450 CT HEAD/BRAIN W/O DYE: CPT | Mod: 26,MA,59

## 2024-02-11 PROCEDURE — 70496 CT ANGIOGRAPHY HEAD: CPT | Mod: 26,MD

## 2024-02-11 PROCEDURE — 71045 X-RAY EXAM CHEST 1 VIEW: CPT | Mod: 26

## 2024-02-11 RX ORDER — ONDANSETRON 8 MG/1
4 TABLET, FILM COATED ORAL ONCE
Refills: 0 | Status: COMPLETED | OUTPATIENT
Start: 2024-02-11 | End: 2024-02-11

## 2024-02-11 RX ORDER — ACETAMINOPHEN 500 MG
650 TABLET ORAL ONCE
Refills: 0 | Status: COMPLETED | OUTPATIENT
Start: 2024-02-11 | End: 2024-02-11

## 2024-02-11 RX ORDER — IBUPROFEN 200 MG
600 TABLET ORAL ONCE
Refills: 0 | Status: COMPLETED | OUTPATIENT
Start: 2024-02-11 | End: 2024-02-11

## 2024-02-11 RX ADMIN — Medication 600 MILLIGRAM(S): at 20:38

## 2024-02-11 RX ADMIN — ONDANSETRON 4 MILLIGRAM(S): 8 TABLET, FILM COATED ORAL at 20:19

## 2024-02-11 NOTE — ED PROVIDER NOTE - PROGRESS NOTE DETAILS
[place] PGY1: discussed with neurology.  they will consult on patient. Attending MD Machuca: Recommendations at this time from neurology resident in consultation with stroke fellow's that symptom complex in their judgment is not concerning for ischemic CVA.  Will follow-up on final notation from neurology consultation note prior to disposition. Attending MD Machuca: requested neurology resident speak to neurology attending to confirm plan of requiring no neuroimaging in this patient.

## 2024-02-11 NOTE — ED PROVIDER NOTE - OTHER FINDINGS
ECG recorded at 1607 independently interpreted by me, Dr Dangelo Machuca, shows normal sinus rhythm normal axis normal intervals no acute diagnostic ischemic ST-T change

## 2024-02-11 NOTE — ED PROVIDER NOTE - ATTENDING CONTRIBUTION TO CARE
Attending MD Machuca:  I have seen and examined this patient and fully participated in the care of this patient as the teaching attending. I personally made/approved the management plan and take responsibility for the patient management.      53-year-old woman with history of hypertension prediabetes recent extensive hospitalization at [facility] for seizure-like activity, patient underwent extensive inpatient workup including video EEG which did not show any epileptiform activity, suspicion was for likely nonepileptiform seizure activity.  Patient is presenting for evaluation of multiple complaints including ongoing chest pain back pain neck pain which has been present since her hospitalization at [facility].  New symptoms since yesterday in the afternoon (patient uncertain of exact time) developed right-sided weakness and numbness.  1 to 2 PM today patient was developing now slurred speech and difficulty getting words out.  Patient states that those symptoms improved but they are still persistent.  She thinks that the symptoms of speech difficulty may be due to difficulty breathing.    Patient's vital signs are notable for elevated blood pressure 177 systolic otherwise nonactionable.  Patient sitting in stretcher in no apparent distress.         NIH Stroke Scale Calculator    1.  a. Level of consciousness: Alert (0 points)      b. Patient asked current month and age: Answers both correctly (0 points)      c. Patient asked to open And close eyes: Obeys both correctly (0 points)  2.  Best gaze: Normal (0 points)  3.  Visual field testing: No visual field loss (0 points)  4.  Facial paresis: Normal symmetrical movement (0 points)  5.  a. Motor function - left arm: Normal (0 points)      b. Motor function - right arm: Drift (1 point)  6.  a. Motor function - left leg: Normal (0 points)      b. Motor function - right leg: Normal (0 points)  7.  Limb ataxia: No ataxia (0 points)  8.  Sensory: Mild to moderate decrease in sensation (1 point)  9.  Best language: No aphasia (0 points)  10. Dysarthria: Normal articulation (0 points)  11. Exinction and inattention: Normal (0 points)    Total: 2 points.      Patient presenting for evaluation of ongoing chronic chest neck and back pain, new symptoms of reported right-sided weakness onset greater than 24 hours ago with reported speech changes at 1 PM today.  Patient at this time to my assessment does not have any obvious dysarthria or aphasia though she is complaining of such.  Exam does show slightly diminished sensation right side of face right arm and right leg.  Question of maybe mild drift right upper extremity.  Patient recent neuroimaging workup in the hospital did not reveal any large vessel stenosis or occlusion.  Patient is outside of the window for code stroke activation given the onset of symptoms (last known normal) greater than 24 hours prior.  Will still consider TIA/CVA in this patient and will obtain neurology consultation and CT head CT angiogram head and neck.  Regarding chest pain, ECG is without diagnostic ischemic findings.  Considered aortic dissection in this patient given presence of chest discomfort as well as neurologic symptoms however patient has had active chest pain for about a week or 2.  We should be able to appreciate any dissection flap of the arch by CTA imaging so I do not feel patient requires dedicated chest imaging.  Will obtain biomarkers to exclude MI.      *The above represents an initial assessment/impression. Please refer to progress notes for potential changes in patient clinical course*

## 2024-02-11 NOTE — ED ADULT NURSE NOTE - OBJECTIVE STATEMENT
53-year-old woman with history of hypertension prediabetes recent extensive hospitalization at [facility] for seizure-like activity, patient underwent extensive inpatient workup including video EEG which did not show any epileptiform activity, suspicion was for likely nonepileptiform seizure activity.  Patient is presenting for evaluation of multiple complaints including ongoing chest pain back pain neck pain which has been present since her hospitalization at [facility].  New symptoms since yesterday in the afternoon (patient uncertain of exact time) developed right-sided weakness and numbness.  1 to 2 PM today patient was developing now slurred speech and difficulty getting words out.  Patient states that those symptoms improved but they are still persistent.  She thinks that the symptoms of speech difficulty may be due to difficulty breathing.

## 2024-02-11 NOTE — ED PROVIDER NOTE - PHYSICAL EXAMINATION
CONSTITUTIONAL: Tired appearing, awake, alert, and in no apparent distress.  HEAD: normocephalic, atraumatic  ENT: oral mucosa moist  CARDIAC: regular rate and rhythm; no murmurs, rubs, or gallops  RESPIRATORY: normal breath sounds, clear to ascultation bilaterally, no rales, rhonchi, wheezing  GASTROINTESTINAL: abdomen nondistended, soft, nontender, no guarding, rebound tenderness  MSK: Spine appears normal, range of motion is not limited, no muscle or joint tenderness  NEURO: Alert and oriented; +decreased sensation to right forehead and check, 4/5 strength RUE and RLE, +right pronator drift; no facial droop, no slurred speech, normal finger to nose BL  SKIN: Skin normal color for race, warm, dry and intact. No evidence of rash.  PSYCH: appropriate mood and affect

## 2024-02-11 NOTE — CONSULT NOTE ADULT - ATTENDING COMMENTS
HPI as per resident note, personally verified by me. Patient with recently diagnosed PNES at [facility] in 1/2024 who was in her usual state of health until 2/10 when she began to feeling dizzy and lightheaded while walking. She then developed weakness and numbness, most prominent on the RIGHT side of her body. She also noted SOB when lying down and dysarthria. All symptoms have since improved and essentially resolved. She reports severe neck and back pain. Denies any abnormal movements or current focal neurologic deficits.    Neurologic exam as per resident note with additions as below:  AAO x3, speech fluent  CN's II-XII intact  Strength BUE's 4+-5/5, BLE's 5/5 and symmetric  Sens intact all  FtN intact b/l  Downgoing b/l plantar response    A1C 5.7%, TSH WNL    < from: CT Angio Neck w/ IV Cont (02.11.24 @ 19:36) >    CT brain: No CT evidence of acute transcortical infarct, acute   intracranial hemorrhage, or mass effect.    CTA neck: No stenosis. No dissection.    CTA brain: No stenosis or aneurysm.    < end of copied text >    < from: MR Brain-Seizure, Epilepsy w/wo IV Cont (02.01.24 @ 10:25) >    No acute infarct, hemorrhage, or mass.    No potential seizure focus identified.    Chronic white matter small vessel changes.    < end of copied text >      A/P:  Weakness  Skin sensation disturbance  Dysarthria  Dizziness  Cervicalgia  HTN  Psychogenic Non-Epileptic Seizures (PNES)    - Symptoms seem more consistent with functional process in the setting of peripheral or cardiac cause of dizziness (especially given prior diagnosis of PNES) but cannot exclude other causes such as toxic/metabolic, demyelinating, inflammatory, infectious, myelopathy, or radiculopathy. She is improved at this time. CT head, personally reviewed by me, without acute intracranial process  - MRI brain, c-spine, t-spine, and l-spine w/ and w/o to assess for above  - Check labs for additional causes with ESR, CRP, B12, folate, TSH, free T4, Vitamin D 25 OH, B1, B6, copper, WNV, Lyme, autoimmune encephalopathy panel, ACE, MIKE, dsDNA  - Continue to address above medical problems, as you are doing  - Will continue to follow patient with you

## 2024-02-11 NOTE — ED PROVIDER NOTE - CLINICAL SUMMARY MEDICAL DECISION MAKING FREE TEXT BOX
54 yo F, hx of HTN, presenting with CP, RUE/RLE weakness, since yesterday afternoon and today episode of slurred speech and See attending attestation

## 2024-02-11 NOTE — ED ADULT TRIAGE NOTE - HEIGHT IN FEET
Continue current medications  Anxiety stable. See Osiris Diallo for evaluation for trouble with concentration. Recommend healthy diet, exercise   Fasting labs today. Return to clinic if any concern.
5

## 2024-02-11 NOTE — ED PROVIDER NOTE - OBJECTIVE STATEMENT
53-year-old female, history hypertension, presenting to ED with complaints of left-sided chest pain that radiated into her breast with pain to the bilateral shoulders and neck.  Starting yesterday and also noticed mild weakness to her left upper extremity and left lower extremity at the time.  Patient reports today while she was laying down began to experience shortness of breath, called her family members who noted her to have slurred speech.  Family at bedside feels her speech is now improved.  Patient reports 1 week ago had possible seizure, was seen at [facility] ED, had EEG, head CT's, MRI, was informed her episodes were not consistent with seizures and could be secondary to stress.  Patient saw her cardiologist 3 days ago Dr. Mina Perez, his Holter monitor.  Also saw neurologist yesterday Dr. Damian Barry.  No recent fevers, abdominal pain, nausea, vomiting. 53-year-old female, history hypertension, presenting to ED with complaints of left-sided chest pain that radiated into her breast with pain to the bilateral shoulders and neck.  Starting yesterday afternoon also noticed mild weakness to her right upper extremity and right lower extremity at the time.  Patient reports today while she was laying down began to experience shortness of breath, called her family members who noted her to have slurred speech.  Family at bedside feels her speech is now improved.  Patient reports 1 week ago had possible seizure, was seen at [facility] ED, had EEG, head CT's, MRI, was informed her episodes were not consistent with seizures and could be secondary to stress.  Patient saw her cardiologist 3 days ago Dr. Mina Perez, his Holter monitor.  Also saw neurologist yesterday Dr. Damian Barry.  No recent fevers, abdominal pain, nausea, vomiting.

## 2024-02-11 NOTE — CONSULT NOTE ADULT - ASSESSMENT
Impression:      Recommendation:            Case to be discussed with Stroke Fellow Ramon Encarnacion MD under supervision of stroke attending Dr. Douglas.  LIZZIE VANCE is a 53y (1970) left handed woman with a PMHx significant for HTN, PNES presenting with chest pain that radiated into her breast with pain to the bilateral shoulders and neck, back pain, SOB, lightheadedness, right sided weakness and numbness. Patient states she started having symptoms on 2/10/24, noticed she felt lightheaded when walking. Then endorses weakness and numbness. Symptoms have been persistent since then. Patient reports today while she was laying down began to experience shortness of breath, called her family members who noted her to have slurred speech.  Family at bedside feels her speech is now improved.  Recently admitted to [facility] in Jan 2024 for seizure like activity with multiple non-epileptic events captured on EEG, diagnosed with PNES thought to be due to stress at work. Patient saw her cardiologist 3 days ago Dr. Mina Perez, his Holter monitor.  Also saw neurologist yesterday Dr. Damian Barry.  Patient denies headache, dizziness, word finding difficulty. Denies any similar symptoms previously.     LKW: 2/10 unknown time  pre MRS: 0  NIHSS: 4 (drift of B/L UE, drift of right leg, sensory deficit)  Not a candidate for tenecteplase or thrombectomy due to out of window, no LVO    Impression:  Right sided weakness and sensory deficit which localizes to left brain dysfunction. Exam limited by pain and variable effort. Will need to evaluate for acute ischemic infarct, although lower suspicion at this time      Recommendation:    [] Obtain MRI brain w/o contrast  [] MRI C spine w/o  [] PT/OT as tolerated  [] As low concern for acute infarct at this time, will not suggest starting antiplatelet  [] HgbA1C, fasting lipid panel, CBC, CMP, coag panel, troponin  [] fall precautions  [] SBP goal: normotension  [] Further recommendations to follow pending imaging       Case discussed with Stroke Fellow Ramon Encarnacion MD under supervision of stroke attending Dr. Ferrera egarding decision against candidacy for Tenecteplase / thrombectomy. Case also discussed with General Neurology attending Dr. Chapa, to be seen on AM rounds. Please await attending attestation.  LIZZIE VANCE is a 53y (1970) left handed woman with a PMHx significant for HTN, PNES presenting with chest pain that radiated into her breast with pain to the bilateral shoulders and neck, back pain, SOB, lightheadedness, right sided weakness and numbness. Patient states she started having symptoms on 2/10/24, noticed she felt lightheaded when walking. Then endorses weakness and numbness. Symptoms have been persistent since then. Patient reports today while she was laying down began to experience shortness of breath, called her family members who noted her to have slurred speech.  Family at bedside feels her speech is now improved.  Recently admitted to [facility] in Jan 2024 for seizure like activity with multiple non-epileptic events captured on EEG, diagnosed with PNES thought to be due to stress at work. Patient saw her cardiologist 3 days ago Dr. Mina Perez, his Holter monitor.  Also saw neurologist yesterday Dr. Damian Barry.  Patient denies headache, dizziness, word finding difficulty. Denies any similar symptoms previously.     LKW: 2/10 unknown time  pre MRS: 0  NIHSS: 4 (drift of B/L UE, drift of right leg, sensory deficit)  Not a candidate for tenecteplase or thrombectomy due to out of window, no LVO    Impression:  Right sided weakness and sensory deficit which localizes to left brain dysfunction. Exam limited by pain and variable effort. Will need to evaluate for acute ischemic infarct, although lower suspicion at this time      Recommendation:    [] Obtain MRI brain w/o contrast  [] MRI C spine w/o  [] PT/OT as tolerated  [] As low concern for acute infarct at this time, will not suggest starting antiplatelet  [] HgbA1C, fasting lipid panel, CBC, CMP, coag panel, troponin  [] fall precautions  [] SBP goal: normotension  [] Further recommendations to follow pending imaging       Case discussed with Stroke Fellow Ramon Encarnacion MD under supervision of stroke attending Dr. Ferrera regarding decision against candidacy for Tenecteplase / thrombectomy. Case also discussed with General Neurology attending Dr. Segovia to be seen on AM rounds. Please await attending attestation.  LIZZIE VANCE is a 53y (1970) left handed woman with a PMHx significant for HTN, PNES presenting with chest pain that radiated into her breast with pain to the bilateral shoulders and neck, back pain, SOB, lightheadedness, right sided weakness and numbness. Patient states she started having symptoms on 2/10/24, noticed she felt lightheaded when walking. Then endorses weakness and numbness. Symptoms have been persistent since then. Patient reports today while she was laying down began to experience shortness of breath, called her family members who noted her to have slurred speech.  Family at bedside feels her speech is now improved.  Recently admitted to [facility] in Jan 2024 for seizure like activity with multiple non-epileptic events captured on EEG, diagnosed with PNES thought to be due to stress at work. Patient saw her cardiologist 3 days ago Dr. Mina Perez, his Holter monitor.  Also saw neurologist yesterday Dr. Damian Barry.  Patient denies headache, dizziness, word finding difficulty. Denies any similar symptoms previously.     LKW: 2/10 unknown time  pre MRS: 0  NIHSS: 4 (drift of B/L UE, drift of right leg, sensory deficit)  Not a candidate for tenecteplase or thrombectomy due to out of window, no LVO    Impression:  Right sided weakness and sensory deficit which localizes to left brain dysfunction. Exam limited by pain and variable effort. Will need to evaluate for acute ischemic infarct, although lower suspicion at this time      Recommendation:    [] Obtain MRI brain w/o contrast  [] MRI C spine w/o  [] PT/OT as tolerated  [] As low concern for acute infarct at this time, will not suggest starting antiplatelet  [] EKG, telemetry, orthostatic vitals  [] HgbA1C, fasting lipid panel, CBC, CMP, coag panel, troponin  [] fall precautions  [] SBP goal: normotension  [] Further recommendations to follow pending imaging       Case discussed with Stroke Fellow Ramon Encarnacion MD under supervision of stroke attending Dr. Ferrera regarding decision against candidacy for Tenecteplase / thrombectomy. Case also discussed with General Neurology attending Dr. Segovia to be seen on AM rounds. Please await attending attestation.  LIZZIE VANCE is a 53y (1970) left handed woman with a PMHx significant for HTN, PNES presenting with chest pain that radiated into her breast with pain to the bilateral shoulders and neck, back pain, SOB, lightheadedness, right sided weakness and numbness. Patient states she started having symptoms on 2/10/24, noticed she felt lightheaded when walking. Then endorses weakness and numbness. Symptoms have been persistent since then. Patient reports today while she was laying down began to experience shortness of breath, called her family members who noted her to have slurred speech.  Family at bedside feels her speech is now improved.  Recently admitted to [facility] in Jan 2024 for seizure like activity with multiple non-epileptic events captured on EEG, diagnosed with PNES thought to be due to stress at work. Patient saw her cardiologist 3 days ago Dr. Mina Perez, his Holter monitor.  Also saw neurologist yesterday Dr. Damian Barry.  Patient denies headache, dizziness, word finding difficulty. Denies any similar symptoms previously.     LKW: 2/10 unknown time  pre MRS: 0  NIHSS: 4 (drift of B/L UE, drift of right leg, sensory deficit)  Not a candidate for tenecteplase or thrombectomy due to out of window, no LVO    Impression:  Right sided weakness and sensory deficit which localizes to left brain dysfunction. Exam limited by pain and variable effort. Will need to evaluate for acute ischemic infarct, although lower suspicion at this time      Recommendation:    [] MRI brain with and w/o contrast  [] MRI C T, L spine with & w/o contrast  [] serum AIE panel   [] PT/OT as tolerated  [] As low concern for acute infarct at this time, will not suggest starting antiplatelet  [] EKG, telemetry, orthostatic vitals  [] HgbA1C, fasting lipid panel, CBC, CMP, coag panel, troponin  [] fall precautions  [] SBP goal: normotension  [] Further recommendations to follow pending imaging       Case discussed with Stroke Fellow Ramon Encarnacion MD under supervision of stroke attending Dr. Ferrera regarding decision against candidacy for Tenecteplase / thrombectomy. Case also discussed with General Neurology attending Dr. Segovia to be seen on AM rounds. Please await attending attestation.

## 2024-02-11 NOTE — CONSULT NOTE ADULT - SUBJECTIVE AND OBJECTIVE BOX
Neurology - Consult Note    -  Spectra: [phone] ([facility]), [phone] ([facility])  -    HPI: Patient LIZZIE VANCE is a 53y (1970) handed woman with a PMHx significant for HTN, PNES     presenting with complaints of left-sided chest pain that radiated into her breast with pain to the bilateral shoulders and neck.  Starting yesterday and also noticed mild weakness to her left upper extremity and left lower extremity at the time.  Patient reports today while she was laying down began to experience shortness of breath, called her family members who noted her to have slurred speech.  Family at bedside feels her speech is now improved.  Patient reports 1 week ago had possible seizure, was seen at [facility] ED, had EEG, head CT's, MRI, was informed her episodes were not consistent with seizures and could be secondary to stress.  Patient saw her cardiologist 3 days ago Dr. Mina Perez, his Holter monitor.  Also saw neurologist yesterday Dr. Damian Barry.  No recent fevers, abdominal pain, nausea, vomiting.    Recently admitted to [facility] in Jan 2024 for seizure like activity with multiple non-epileptic events captured on EEG, diagnosed with PNES.   Stress at work       Review of Systems:  INCOMPLETE   CONSTITUTIONAL: No fevers or chills  EYES AND ENT: No visual changes or no throat pain   NECK: No pain or stiffness  RESPIRATORY: No hemoptysis or shortness of breath  CARDIOVASCULAR: No chest pain or palpitations  GASTROINTESTINAL: No melena or hematochezia  GENITOURINARY: No dysuria or hematuria  NEUROLOGICAL: +As stated in HPI above  SKIN: No itching, burning, rashes, or lesions   All other review of systems is negative unless indicated above.    Allergies:  shellfish (Rash)  penicillins (Anaphylaxis)      PMHx/PSHx/Family Hx: As above, otherwise see below   Hypertension    Anxiety    Depression        Social Hx:  No current use of tobacco, alcohol, or illicit drugs  Lives with ***    Medications:  MEDICATIONS  (STANDING):    MEDICATIONS  (PRN):      Vitals:  T(C): 36.9 (02-11-24 @ 18:58), Max: 36.9 (02-11-24 @ 18:58)  HR: 70 (02-11-24 @ 18:58) (64 - 70)  BP: 135/82 (02-11-24 @ 18:58) (135/82 - 184/80)  RR: 18 (02-11-24 @ 18:58) (16 - 18)  SpO2: 98% (02-11-24 @ 18:58) (98% - 98%)    Physical Examination: INCOMPLETE  General - NAD  Cardiovascular - Peripheral pulses palpable, no edema  Eyes - Fundoscopy with flat, sharp optic discs and no hemorrhage or exudates; Fundoscopy not well visualized; Fundoscopy not performed due to safety precautions in the setting of the COVID-19 pandemic    Neurologic Exam:  Mental status - Awake, Alert, Oriented to person, place, and time. Speech fluent, repetition and naming intact. Follows simple and complex commands. Attention/concentration, recent and remote memory (including registration and recall), and fund of knowledge intact    Cranial nerves - PERRLA, VFF, EOMI, face sensation (V1-V3) intact b/l, facial strength intact without asymmetry b/l, hearing intact b/l, palate with symmetric elevation, trapezius OR sternocleidomastiod 5/5 strength b/l, tongue midline on protrusion with full lateral movement    Motor - Normal bulk and tone throughout. No pronator drift.  Strength testing            Deltoid      Biceps      Triceps     Wrist Extension    Wrist Flexion     Interossei         R            5                 5               5                     5                              5                        5                 5  L             5                 5               5                     5                              5                        5                 5              Hip Flexion    Hip Extension    Knee Flexion    Knee Extension    Dorsiflexion    Plantar Flexion  R              5                           5                       5                           5                            5                          5  L              5                           5                        5                           5                            5                          5    Sensation - Light touch/temperature OR pain/vibration intact throughout    DTR's -             Biceps      Triceps     Brachioradialis      Patellar    Ankle    Toes/plantar response  R             2+             2+                  2+                       2+            2+                 Down  L              2+             2+                 2+                        2+           2+                 Down    Coordination - Finger to Nose intact b/l. No tremors appreciated    Gait and station - Normal casual gait. Romberg (-)    Labs:                        13.2   5.68  )-----------( 313      ( 11 Feb 2024 17:34 )             41.5     02-11    139  |  107  |  9   ----------------------------<  97  6.8<HH>   |  23  |  0.72    Ca    9.3      11 Feb 2024 17:34  Phos  3.2     02-11  Mg     2.0     02-11    TPro  7.6  /  Alb  3.7  /  TBili  0.2  /  DBili  x   /  AST  34  /  ALT  22  /  AlkPhos  89  02-11    CAPILLARY BLOOD GLUCOSE        LIVER FUNCTIONS - ( 11 Feb 2024 17:34 )  Alb: 3.7 g/dL / Pro: 7.6 g/dL / ALK PHOS: 89 U/L / ALT: 22 U/L / AST: 34 U/L / GGT: x             Radiology:    MR Brain-Seizure, Epilepsy w/wo IV Cont (02.01.24 @ 10:25)   FINDINGS:  No acute infarction, intracranial hemorrhage or mass lesion. No abnormal   intracranial enhancement is identified.    Scattered FLAIR hyperintense white matter foci are noted, most compatible   with chronic small vessel disease.    The hippocampi appear unremarkable. There is no evidence for congenital   malformation/developmental anomaly.    No hydrocephalus. No extra-axial fluid collections. The skull base flow   voids are present.    The visualized intraorbital contents are unremarkable. The imaged   portions of the paranasal sinuses are clear. The mastoid air cells are   clear. The visualized osseous structures, soft tissues and partially   visualized parotid glands appear normal.    IMPRESSION:  No acute infarct, hemorrhage, or mass.  No potential seizure focus identified.  Chronic white matter small vessel changes.       Neurology - Consult Note    -  Spectra: [phone] ([facility]), [phone] ([facility])  -    HPI: Patient LIZZIE VANCE is a 53y (1970) left handed woman with a PMHx significant for HTN, PNES presenting with chest pain that radiated into her breast with pain to the bilateral shoulders and neck, back pain, SOB, lightheadedness, right sided weakness and numbness. Patient states she started having symptoms on 2/10/24, noticed she felt lightheaded when walking. Then endorses weakness and numbness. Symptoms have been persistent since then. Patient reports today while she was laying down began to experience shortness of breath, called her family members who noted her to have slurred speech.  Family at bedside feels her speech is now improved.  Recently admitted to [facility] in Jan 2024 for seizure like activity with multiple non-epileptic events captured on EEG, diagnosed with PNES thought to be due to stress at work. Patient saw her cardiologist 3 days ago Dr. Mina Perez, his Holter monitor.  Also saw neurologist yesterday Dr. Damian Barry.  Patient denies headache, dizziness, word finding difficulty. Denies any similar symptoms previously.       LKW: 2/10 unknown time  pre MRS: 0  NIHSS: 4 (drift of B/L UE, drift of right leg, sensory deficit)    Review of Systems:    All other review of systems is negative unless indicated above.    Allergies:  shellfish (Rash)  penicillins (Anaphylaxis)      PMHx/PSHx/Family Hx: As above, otherwise see below   Hypertension  Anxiety  Depression      Social Hx:  No current use of tobacco, alcohol, or illicit drugs  Lives with family  Independent with ADLs    Medications:  MEDICATIONS  (STANDING):    MEDICATIONS  (PRN):    Vitals:  T(C): 36.9 (02-11-24 @ 18:58), Max: 36.9 (02-11-24 @ 18:58)  HR: 70 (02-11-24 @ 18:58) (64 - 70)  BP: 135/82 (02-11-24 @ 18:58) (135/82 - 184/80)  RR: 18 (02-11-24 @ 18:58) (16 - 18)  SpO2: 98% (02-11-24 @ 18:58) (98% - 98%)    Physical Examination:   General - NAD  Cardiovascular - Peripheral pulses palpable, no edema  Eyes -  Fundoscopy not performed due to safety precautions in the setting of the COVID-19 pandemic    Neurologic Exam:  Mental status - Awake, Alert, Oriented to person, place, and time. Speech fluent, repetition and naming intact. Follows simple and complex commands. Attention/concentration, recent and remote memory (including registration and recall), and fund of knowledge intact    Cranial nerves - Right pupil 2mm and not reactive, Left 3mm>2mm (round B/L), VFF, EOMI, face sensation (V1-V3) decreased to light touch, pinprick, vibration, temp on R. Facial strength intact without asymmetry b/l, hearing intact b/l, palate with symmetric elevation, trapezius 5/5 strength b/l, tongue midline on protrusion with full lateral movement    Motor - Normal bulk and tone throughout. B/L UE drift, symmetric.   Strength testing- limited by pain (shoulder/ back) and effort.   RUE 4/5 proximal, 4-/5 distal  LUE 4/5 throughout   RLE 4/5 proximal, 4+/5 distal   LLE 5/5    Hoovers positive     Sensation - Light touch, temperature, pain, vibration decreased on R arm and leg     DTR's -             Biceps      Triceps     Brachioradialis      Patellar    Ankle    Toes/plantar response  R             2+             2+                  2+                       2+            2+                 Down  L              2+             2+                 2+                        2+           2+                 Down    Coordination - Finger to Nose intact b/l. No tremors appreciated    Gait and station - Able to take few steps cautiously before stating she cannot ambulate due to lightheadedness    Labs:                        13.2   5.68  )-----------( 313      ( 11 Feb 2024 17:34 )             41.5     02-11    139  |  107  |  9   ----------------------------<  97  6.8<HH>   |  23  |  0.72    Ca    9.3      11 Feb 2024 17:34  Phos  3.2     02-11  Mg     2.0     02-11    TPro  7.6  /  Alb  3.7  /  TBili  0.2  /  DBili  x   /  AST  34  /  ALT  22  /  AlkPhos  89  02-11    CAPILLARY BLOOD GLUCOSE        LIVER FUNCTIONS - ( 11 Feb 2024 17:34 )  Alb: 3.7 g/dL / Pro: 7.6 g/dL / ALK PHOS: 89 U/L / ALT: 22 U/L / AST: 34 U/L / GGT: x             Radiology:    CT Head No Cont (02.11.24 @ 19:35)   CT brain:  There is no CT evidence of acute transcortical infarct. Age-related   involutional changes and chronic microvascular ischemic changes.    There is no hydrocephalus, mass effect, or acute intracranial hemorrhage.   No extra-axial collection. Basal cisterns are patent.    The visualized paranasal sinuses and mastoid air cells are clear.    The calvarium is intact.      CTA neck:  The visualized aorta and great vessels demonstrate no significant   stenosis.  The common carotid arteries demonstrate no significant stenosis.  The internal carotid arteries and external carotid arteries demonstrate   no significant stenosis.  The vertebral arteries demonstrate no significant stenosis.    All measurements are performed using standard NASCET criteria.      CTA brain:  The distal internal carotid arteries are patent.  The [place] is normal in appearance.  The visualized cerebral arteries are unremarkable.  The vertebrobasilar junction and basilar artery are normal.      IMPRESSION:  CT brain: No CT evidence of acute transcortical infarct, acute   intracranial hemorrhage, or mass effect.    CTA neck: No stenosis. No dissection.    CTA brain: No stenosis or aneurysm.    COMMENT:  Reference per NASCET criteria for degree of stenosis: Mild: less than 50%   stenosis. Moderate: 50-69% stenosis. Severe: 70-94% stenosis. Near   occlusion: 95-99% stenosis.      MR Brain-Seizure, Epilepsy w/wo IV Cont (02.01.24 @ 10:25)   FINDINGS:  No acute infarction, intracranial hemorrhage or mass lesion. No abnormal   intracranial enhancement is identified.    Scattered FLAIR hyperintense white matter foci are noted, most compatible   with chronic small vessel disease.    The hippocampi appear unremarkable. There is no evidence for congenital   malformation/developmental anomaly.    No hydrocephalus. No extra-axial fluid collections. The skull base flow   voids are present.    The visualized intraorbital contents are unremarkable. The imaged   portions of the paranasal sinuses are clear. The mastoid air cells are   clear. The visualized osseous structures, soft tissues and partially   visualized parotid glands appear normal.    IMPRESSION:  No acute infarct, hemorrhage, or mass.  No potential seizure focus identified.  Chronic white matter small vessel changes.       Neurology - Consult Note    -  Spectra: [phone] ([facility]), [phone] ([facility])  -    HPI: Patient LIZZIE VANCE is a 53y (1970) left handed woman with a PMHx significant for HTN, PNES presenting with chest pain that radiated into her breast with pain to the bilateral shoulders and neck, back pain, SOB, lightheadedness, right sided weakness and numbness. Patient states she started having symptoms on 2/10/24, noticed she felt lightheaded when walking. Then endorses weakness and numbness. Symptoms have been persistent since then. Patient reports today while she was laying down began to experience shortness of breath, called her family members who noted her to have slurred speech.  Family at bedside feels her speech is now improved.  Recently admitted to [facility] in Jan 2024 for seizure like activity with multiple non-epileptic events captured on EEG, diagnosed with PNES thought to be due to stress at work. Patient saw her cardiologist 3 days ago Dr. Mina Perez, rec Holter monitor.  Also saw neurologist yesterday Dr. Damian Barry.  Patient denies headache, dizziness, word finding difficulty. Denies any similar symptoms previously.       LKW: 2/10 unknown time  pre MRS: 0  NIHSS: 4 (drift of B/L UE, drift of right leg, sensory deficit)    Review of Systems:    All other review of systems is negative unless indicated above.    Allergies:  shellfish (Rash)  penicillins (Anaphylaxis)      PMHx/PSHx/Family Hx: As above, otherwise see below   Hypertension  Anxiety  Depression      Social Hx:  No current use of tobacco, alcohol, or illicit drugs  Lives with family  Independent with ADLs    Medications:  MEDICATIONS  (STANDING):    MEDICATIONS  (PRN):    Vitals:  T(C): 36.9 (02-11-24 @ 18:58), Max: 36.9 (02-11-24 @ 18:58)  HR: 70 (02-11-24 @ 18:58) (64 - 70)  BP: 135/82 (02-11-24 @ 18:58) (135/82 - 184/80)  RR: 18 (02-11-24 @ 18:58) (16 - 18)  SpO2: 98% (02-11-24 @ 18:58) (98% - 98%)    Physical Examination:   General - NAD  Cardiovascular - Peripheral pulses palpable, no edema  Eyes -  Fundoscopy not performed due to safety precautions in the setting of the COVID-19 pandemic    Neurologic Exam:  Mental status - Awake, Alert, Oriented to person, place, and time. Speech fluent, repetition and naming intact. Follows simple and complex commands. Attention/concentration, recent and remote memory (including registration and recall), and fund of knowledge intact    Cranial nerves - Right pupil 2mm and not reactive, Left 3mm>2mm (round B/L), VFF, EOMI, face sensation (V1-V3) decreased to light touch, pinprick, vibration, temp on R. Facial strength intact without asymmetry b/l, hearing intact b/l, palate with symmetric elevation, trapezius 5/5 strength b/l, tongue midline on protrusion with full lateral movement    Motor - Normal bulk and tone throughout. B/L UE drift, symmetric.   Strength testing- limited by pain (shoulder/ back) and effort.   RUE 4/5 proximal, 4-/5 distal  LUE 4/5 throughout   RLE 4/5 proximal, 4+/5 distal   LLE 5/5    Hoovers positive     Sensation - Light touch, temperature, pain, vibration decreased on R arm and leg     DTR's -             Biceps      Triceps     Brachioradialis      Patellar    Ankle    Toes/plantar response  R             2+             2+                  2+                       2+            2+                 Down  L              2+             2+                 2+                        2+           2+                 Down    Coordination - Finger to Nose intact b/l. No tremors appreciated    Gait and station - Able to take few steps cautiously before stating she cannot ambulate due to lightheadedness    Labs:                        13.2   5.68  )-----------( 313      ( 11 Feb 2024 17:34 )             41.5     02-11    139  |  107  |  9   ----------------------------<  97  6.8<HH>   |  23  |  0.72    Ca    9.3      11 Feb 2024 17:34  Phos  3.2     02-11  Mg     2.0     02-11    TPro  7.6  /  Alb  3.7  /  TBili  0.2  /  DBili  x   /  AST  34  /  ALT  22  /  AlkPhos  89  02-11    CAPILLARY BLOOD GLUCOSE        LIVER FUNCTIONS - ( 11 Feb 2024 17:34 )  Alb: 3.7 g/dL / Pro: 7.6 g/dL / ALK PHOS: 89 U/L / ALT: 22 U/L / AST: 34 U/L / GGT: x             Radiology:    CT Head No Cont (02.11.24 @ 19:35)   CT brain:  There is no CT evidence of acute transcortical infarct. Age-related   involutional changes and chronic microvascular ischemic changes.    There is no hydrocephalus, mass effect, or acute intracranial hemorrhage.   No extra-axial collection. Basal cisterns are patent.    The visualized paranasal sinuses and mastoid air cells are clear.    The calvarium is intact.      CTA neck:  The visualized aorta and great vessels demonstrate no significant   stenosis.  The common carotid arteries demonstrate no significant stenosis.  The internal carotid arteries and external carotid arteries demonstrate   no significant stenosis.  The vertebral arteries demonstrate no significant stenosis.    All measurements are performed using standard NASCET criteria.      CTA brain:  The distal internal carotid arteries are patent.  The [place] is normal in appearance.  The visualized cerebral arteries are unremarkable.  The vertebrobasilar junction and basilar artery are normal.      IMPRESSION:  CT brain: No CT evidence of acute transcortical infarct, acute   intracranial hemorrhage, or mass effect.    CTA neck: No stenosis. No dissection.    CTA brain: No stenosis or aneurysm.    COMMENT:  Reference per NASCET criteria for degree of stenosis: Mild: less than 50%   stenosis. Moderate: 50-69% stenosis. Severe: 70-94% stenosis. Near   occlusion: 95-99% stenosis.      MR Brain-Seizure, Epilepsy w/wo IV Cont (02.01.24 @ 10:25)   FINDINGS:  No acute infarction, intracranial hemorrhage or mass lesion. No abnormal   intracranial enhancement is identified.    Scattered FLAIR hyperintense white matter foci are noted, most compatible   with chronic small vessel disease.    The hippocampi appear unremarkable. There is no evidence for congenital   malformation/developmental anomaly.    No hydrocephalus. No extra-axial fluid collections. The skull base flow   voids are present.    The visualized intraorbital contents are unremarkable. The imaged   portions of the paranasal sinuses are clear. The mastoid air cells are   clear. The visualized osseous structures, soft tissues and partially   visualized parotid glands appear normal.    IMPRESSION:  No acute infarct, hemorrhage, or mass.  No potential seizure focus identified.  Chronic white matter small vessel changes.       stated

## 2024-02-12 DIAGNOSIS — R53.1 WEAKNESS: ICD-10-CM

## 2024-02-12 DIAGNOSIS — Z29.9 ENCOUNTER FOR PROPHYLACTIC MEASURES, UNSPECIFIED: ICD-10-CM

## 2024-02-12 DIAGNOSIS — I10 ESSENTIAL (PRIMARY) HYPERTENSION: ICD-10-CM

## 2024-02-12 LAB
A1C WITH ESTIMATED AVERAGE GLUCOSE RESULT: 5.7 % — HIGH (ref 4–5.6)
ANION GAP SERPL CALC-SCNC: 14 MMOL/L — SIGNIFICANT CHANGE UP (ref 5–17)
BUN SERPL-MCNC: 13 MG/DL — SIGNIFICANT CHANGE UP (ref 7–23)
CALCIUM SERPL-MCNC: 9 MG/DL — SIGNIFICANT CHANGE UP (ref 8.4–10.5)
CHLORIDE SERPL-SCNC: 108 MMOL/L — SIGNIFICANT CHANGE UP (ref 96–108)
CHOLEST SERPL-MCNC: 167 MG/DL — SIGNIFICANT CHANGE UP
CO2 SERPL-SCNC: 23 MMOL/L — SIGNIFICANT CHANGE UP (ref 22–31)
CREAT SERPL-MCNC: 0.89 MG/DL — SIGNIFICANT CHANGE UP (ref 0.5–1.3)
CULTURE RESULTS: SIGNIFICANT CHANGE UP
EGFR: 78 ML/MIN/1.73M2 — SIGNIFICANT CHANGE UP
ESTIMATED AVERAGE GLUCOSE: 117 MG/DL — HIGH (ref 68–114)
GLUCOSE SERPL-MCNC: 80 MG/DL — SIGNIFICANT CHANGE UP (ref 70–99)
HCT VFR BLD CALC: 37.9 % — SIGNIFICANT CHANGE UP (ref 34.5–45)
HDLC SERPL-MCNC: 52 MG/DL — SIGNIFICANT CHANGE UP
HGB BLD-MCNC: 12 G/DL — SIGNIFICANT CHANGE UP (ref 11.5–15.5)
LIPID PNL WITH DIRECT LDL SERPL: 101 MG/DL — HIGH
MAGNESIUM SERPL-MCNC: 2 MG/DL — SIGNIFICANT CHANGE UP (ref 1.6–2.6)
MCHC RBC-ENTMCNC: 27.6 PG — SIGNIFICANT CHANGE UP (ref 27–34)
MCHC RBC-ENTMCNC: 31.7 GM/DL — LOW (ref 32–36)
MCV RBC AUTO: 87.1 FL — SIGNIFICANT CHANGE UP (ref 80–100)
NON HDL CHOLESTEROL: 115 MG/DL — SIGNIFICANT CHANGE UP
NRBC # BLD: 0 /100 WBCS — SIGNIFICANT CHANGE UP (ref 0–0)
PHOSPHATE SERPL-MCNC: 4.4 MG/DL — SIGNIFICANT CHANGE UP (ref 2.5–4.5)
PLATELET # BLD AUTO: 258 K/UL — SIGNIFICANT CHANGE UP (ref 150–400)
POTASSIUM SERPL-MCNC: 4.4 MMOL/L — SIGNIFICANT CHANGE UP (ref 3.5–5.3)
POTASSIUM SERPL-SCNC: 4.4 MMOL/L — SIGNIFICANT CHANGE UP (ref 3.5–5.3)
RBC # BLD: 4.35 M/UL — SIGNIFICANT CHANGE UP (ref 3.8–5.2)
RBC # FLD: 13.2 % — SIGNIFICANT CHANGE UP (ref 10.3–14.5)
SODIUM SERPL-SCNC: 145 MMOL/L — SIGNIFICANT CHANGE UP (ref 135–145)
SPECIMEN SOURCE: SIGNIFICANT CHANGE UP
TRIGL SERPL-MCNC: 72 MG/DL — SIGNIFICANT CHANGE UP
WBC # BLD: 5.18 K/UL — SIGNIFICANT CHANGE UP (ref 3.8–10.5)
WBC # FLD AUTO: 5.18 K/UL — SIGNIFICANT CHANGE UP (ref 3.8–10.5)

## 2024-02-12 PROCEDURE — 72141 MRI NECK SPINE W/O DYE: CPT | Mod: 26

## 2024-02-12 PROCEDURE — 99223 1ST HOSP IP/OBS HIGH 75: CPT | Mod: GC

## 2024-02-12 PROCEDURE — 99223 1ST HOSP IP/OBS HIGH 75: CPT

## 2024-02-12 PROCEDURE — 70551 MRI BRAIN STEM W/O DYE: CPT | Mod: 26

## 2024-02-12 RX ORDER — LANOLIN ALCOHOL/MO/W.PET/CERES
3 CREAM (GRAM) TOPICAL AT BEDTIME
Refills: 0 | Status: DISCONTINUED | OUTPATIENT
Start: 2024-02-12 | End: 2024-02-14

## 2024-02-12 RX ORDER — VALSARTAN 80 MG/1
160 TABLET ORAL DAILY
Refills: 0 | Status: DISCONTINUED | OUTPATIENT
Start: 2024-02-12 | End: 2024-02-14

## 2024-02-12 RX ORDER — ACETAMINOPHEN 500 MG
650 TABLET ORAL EVERY 6 HOURS
Refills: 0 | Status: DISCONTINUED | OUTPATIENT
Start: 2024-02-12 | End: 2024-02-14

## 2024-02-12 RX ORDER — ONDANSETRON 8 MG/1
4 TABLET, FILM COATED ORAL EVERY 8 HOURS
Refills: 0 | Status: DISCONTINUED | OUTPATIENT
Start: 2024-02-12 | End: 2024-02-14

## 2024-02-12 RX ORDER — IBUPROFEN 200 MG
400 TABLET ORAL ONCE
Refills: 0 | Status: COMPLETED | OUTPATIENT
Start: 2024-02-12 | End: 2024-02-12

## 2024-02-12 RX ORDER — IBUPROFEN 200 MG
600 TABLET ORAL ONCE
Refills: 0 | Status: COMPLETED | OUTPATIENT
Start: 2024-02-12 | End: 2024-02-12

## 2024-02-12 RX ADMIN — VALSARTAN 160 MILLIGRAM(S): 80 TABLET ORAL at 09:32

## 2024-02-12 RX ADMIN — Medication 400 MILLIGRAM(S): at 23:00

## 2024-02-12 RX ADMIN — Medication 600 MILLIGRAM(S): at 09:32

## 2024-02-12 RX ADMIN — Medication 400 MILLIGRAM(S): at 22:27

## 2024-02-12 NOTE — H&P ADULT - HISTORY OF PRESENT ILLNESS
53F PMHx HTN and recently dx'd PNES. Presenting w/ CP radiating to b/l shoulder and neck, back pain, dizziness and right-sided weakness and numbness x2days. She felt SOB on 2/11, called her family, who noticed she was slurring speech.   Of note, pt was recently admitted at [facility] 1/26-2/1 with witnessed convulsions, had w/u for stroke which was negative. EEG found no epileptic activity. Pt was dc'd with diagnosis of PNES, likely d/t stress. Pt was dc'd with bactrim for UTI and advised to f/u with psychiatrist     In ED, pt was initially hypertensive but became normotensive spontaneously. No fever, HR wnl, and on RA.     Eval by neuro in the ED, low suspicion for acute stroke.   Rec MRI c-spine w/o, MR brain w/o.   CBC unremarkable, CMP unremarkable. trop <6 x2, UA unremarkable.   Rvp neg for covid, flu a/b, and rsv.     Pt was code stroke. CTH noncon and CTA head/neck neg for any acute infarct or other acute path.     *TSH 2.33 on 1/26/24 53F PMHx HTN and recently dx'd PNES. Presenting w/ CP radiating to b/l shoulder and neck, back pain, dizziness and right-sided weakness and numbness x2days. She felt SOB on 2/11, called her family, who noticed she was slurring speech.     In ED, pt was initially hypertensive but became normotensive spontaneously. No fever, HR wnl, and on RA.     Eval by neuro in the ED, low suspicion for acute stroke.   Rec MRI c-spine w/o, MR brain w/o.   CBC unremarkable, CMP unremarkable. trop <6 x2, UA unremarkable.   Rvp neg for covid, flu a/b, and rsv.     Pt was code stroke. CTH noncon and CTA head/neck neg for any acute infarct or other acute path.     Of note, pt was recently admitted at [facility] 1/26-2/1 with witnessed convulsions, had w/u for stroke which was negative. EEG found no epileptic activity. Pt was dc'd with diagnosis of PNES, likely d/t stress. Pt was dc'd with bactrim for UTI and advised to f/u with psychiatrist. Since discharge, patient saw her cardiologist Dr. Mina Perez, and currently has heart monitor.  Also saw neurologist Dr. Damian Barry on 2/10.   *TSH 2.33 on 1/26/24

## 2024-02-12 NOTE — H&P ADULT - PROBLEM SELECTOR PLAN 1
-will order MR imaging per neuro recs   -TSH was normal last month.   -f/u lipid panel and hgb a1c  -f/u neuro

## 2024-02-12 NOTE — H&P ADULT - NSHPLABSRESULTS_GEN_ALL_CORE
13.2   5.68  )-----------( 313      ( 2024 17:34 )             41.5       02-11    x   |  x   |  x   ----------------------------<  x   4.4   |  x   |  x     Ca    9.3      2024 17:34  Phos  3.2     02-11  Mg     2.0         TPro  7.6  /  Alb  3.7  /  TBili  0.2  /  DBili  x   /  AST  34  /  ALT  22  /  AlkPhos  89  02-11              Urinalysis Basic - ( 2024 17:34 )    Color: Yellow / Appearance: Clear / S.011 / pH: x  Gluc: 97 mg/dL / Ketone: Negative mg/dL  / Bili: Negative / Urobili: 0.2 mg/dL   Blood: x / Protein: Negative mg/dL / Nitrite: Negative   Leuk Esterase: Negative / RBC: 3 /HPF / WBC 0 /HPF   Sq Epi: x / Non Sq Epi: 0 /HPF / Bacteria: Negative /HPF                  CAPILLARY BLOOD GLUCOSE

## 2024-02-12 NOTE — CHART NOTE - NSCHARTNOTEFT_GEN_A_CORE
H&P reviewed. Pt seen and examined at bedside. Concerned about blood clots as her father has a history of DVT and PE. No objective signs on exam (no LE edema).   - pending MRIs as ordered

## 2024-02-12 NOTE — PATIENT PROFILE ADULT - FALL HARM RISK - HARM RISK INTERVENTIONS
Assistance with ambulation/Assistance OOB with selected safe patient handling equipment/Communicate Risk of Fall with Harm to all staff/Discuss with provider need for PT consult/Monitor gait and stability/Provide patient with walking aids - walker, cane, crutches/Reinforce activity limits and safety measures with patient and family/Sit up slowly, dangle for a short time, stand at bedside before walking/Tailored Fall Risk Interventions/Visual Cue: Yellow wristband and red socks/Bed in lowest position, wheels locked, appropriate side rails in place/Call bell, personal items and telephone in reach/Instruct patient to call for assistance before getting out of bed or chair/Non-slip footwear when patient is out of bed/[place] to call system/Physically safe environment - no spills, clutter or unnecessary equipment/Purposeful Proactive Rounding/Room/bathroom lighting operational, light cord in reach

## 2024-02-12 NOTE — H&P ADULT - NSICDXPASTMEDICALHX_GEN_ALL_CORE_FT
PAST MEDICAL HISTORY:  Anxiety     Depression     Hypertension     Psychogenic nonepileptic seizure

## 2024-02-12 NOTE — H&P ADULT - NSHPPHYSICALEXAM_GEN_ALL_CORE
Vital Signs Last 24 Hrs  T(C): 36.7 (11 Feb 2024 19:32), Max: 36.9 (11 Feb 2024 18:58)  T(F): 98 (11 Feb 2024 19:32), Max: 98.5 (11 Feb 2024 18:58)  HR: 74 (11 Feb 2024 22:59) (64 - 79)  BP: 154/83 (11 Feb 2024 22:59) (125/74 - 184/80)  BP(mean): 105 (11 Feb 2024 22:59) (105 - 105)  RR: 16 (11 Feb 2024 22:59) (16 - 18)  SpO2: 99% (11 Feb 2024 22:59) (98% - 99%)    Parameters below as of 11 Feb 2024 22:59  Patient On (Oxygen Delivery Method): room air        CONSTITUTIONAL: Well-groomed, in no apparent distress  EYES: No conjunctival or scleral injection, non-icteric  ENMT: No external nasal lesions; MMM  RESPIRATORY: Breathing comfortably; lungs CTA without wheeze/rhonchi/rales  CARDIOVASCULAR: +S1S2, RRR, no M/G/R; pedal pulses full and symmetric; no lower extremity edema  GASTROINTESTINAL: No tenderness, +BS throughout, no rebound/guarding  SKIN: No rashes or ulcers noted  NEUROLOGIC: No gross focal neurological deficits, AAOX3  PSYCHIATRIC: mood and affect appropriate; appropriate insight and judgment  MSK: 4/5 strength on BUE, and RLE although with variable effort each time. LLE 5/5.

## 2024-02-12 NOTE — ED ADULT NURSE REASSESSMENT NOTE - NS ED NURSE REASSESS COMMENT FT1
Received report from night JOSEMANUEL Sotelo. Upon assessment, A&Ox4, denies chest pain, SOB, n/v, lightheadedness. Requesting home valsartan dose and ibuprofen for pain. AARON Taylor contacted for medications. Provided breakfast.

## 2024-02-13 LAB
24R-OH-CALCIDIOL SERPL-MCNC: 18.8 NG/ML — LOW (ref 30–80)
CRP SERPL-MCNC: <3 MG/L — SIGNIFICANT CHANGE UP (ref 0–4)
ERYTHROCYTE [SEDIMENTATION RATE] IN BLOOD: 29 MM/HR — HIGH (ref 0–20)
FOLATE SERPL-MCNC: 10.1 NG/ML — SIGNIFICANT CHANGE UP
VIT B12 SERPL-MCNC: 600 PG/ML — SIGNIFICANT CHANGE UP (ref 232–1245)

## 2024-02-13 PROCEDURE — 72146 MRI CHEST SPINE W/O DYE: CPT | Mod: 26

## 2024-02-13 PROCEDURE — 72148 MRI LUMBAR SPINE W/O DYE: CPT | Mod: 26

## 2024-02-13 PROCEDURE — 99232 SBSQ HOSP IP/OBS MODERATE 35: CPT

## 2024-02-13 RX ORDER — CHOLECALCIFEROL (VITAMIN D3) 125 MCG
2000 CAPSULE ORAL DAILY
Refills: 0 | Status: DISCONTINUED | OUTPATIENT
Start: 2024-02-13 | End: 2024-02-14

## 2024-02-13 RX ORDER — IBUPROFEN 200 MG
400 TABLET ORAL ONCE
Refills: 0 | Status: COMPLETED | OUTPATIENT
Start: 2024-02-13 | End: 2024-02-13

## 2024-02-13 RX ADMIN — VALSARTAN 160 MILLIGRAM(S): 80 TABLET ORAL at 05:43

## 2024-02-13 RX ADMIN — Medication 400 MILLIGRAM(S): at 20:00

## 2024-02-13 RX ADMIN — Medication 400 MILLIGRAM(S): at 19:11

## 2024-02-13 NOTE — PROGRESS NOTE ADULT - NSPROGADDITIONALINFOA_GEN_ALL_CORE
The necessity of the time spent during the encounter on this date of service was due to:   - Ordering, reviewing, and interpreting labs, testing, and imaging  - Independently obtaining a review of systems and performing a physical exam  - Reviewing prior hospitalization and where necessary, outpatient records  - Reviewing consultant recommendations/communicating with consultants  - Counselling and educating patient and family regarding interpretation of aforementioned items and plan of care    Time-based billing (NON-critical care). Total minutes spent: 40

## 2024-02-13 NOTE — PROGRESS NOTE ADULT - PROBLEM SELECTOR PLAN 1
-will order MR imaging per neuro recs   -TSH was normal last month.   - lipid and a1c reviewed  - ESR, CRP, B12, folate, TSH, free T4, Vitamin D 25 OH, B1, B6, copper, WNV, Lyme, autoimmune encephalopathy panel, ACE, MIKE, dsDNA  -f/u neuro recs  -

## 2024-02-14 ENCOUNTER — TRANSCRIPTION ENCOUNTER (OUTPATIENT)
Age: 54
End: 2024-02-14

## 2024-02-14 VITALS — HEART RATE: 67 BPM | TEMPERATURE: 98 F | OXYGEN SATURATION: 100 % | RESPIRATION RATE: 18 BRPM

## 2024-02-14 LAB
B BURGDOR C6 AB SER-ACNC: NEGATIVE — SIGNIFICANT CHANGE UP
B BURGDOR IGG+IGM SER-ACNC: 0.75 INDEX — SIGNIFICANT CHANGE UP (ref 0.01–0.89)
DSDNA AB SER-ACNC: <12 IU/ML — SIGNIFICANT CHANGE UP

## 2024-02-14 PROCEDURE — 87086 URINE CULTURE/COLONY COUNT: CPT

## 2024-02-14 PROCEDURE — 96374 THER/PROPH/DIAG INJ IV PUSH: CPT

## 2024-02-14 PROCEDURE — 70498 CT ANGIOGRAPHY NECK: CPT | Mod: MA

## 2024-02-14 PROCEDURE — 86038 ANTINUCLEAR ANTIBODIES: CPT

## 2024-02-14 PROCEDURE — 72148 MRI LUMBAR SPINE W/O DYE: CPT

## 2024-02-14 PROCEDURE — 80061 LIPID PANEL: CPT

## 2024-02-14 PROCEDURE — 99285 EMERGENCY DEPT VISIT HI MDM: CPT | Mod: 25

## 2024-02-14 PROCEDURE — 84484 ASSAY OF TROPONIN QUANT: CPT

## 2024-02-14 PROCEDURE — 70496 CT ANGIOGRAPHY HEAD: CPT | Mod: MD

## 2024-02-14 PROCEDURE — 82164 ANGIOTENSIN I ENZYME TEST: CPT

## 2024-02-14 PROCEDURE — 84295 ASSAY OF SERUM SODIUM: CPT

## 2024-02-14 PROCEDURE — 72146 MRI CHEST SPINE W/O DYE: CPT

## 2024-02-14 PROCEDURE — 99239 HOSP IP/OBS DSCHRG MGMT >30: CPT

## 2024-02-14 PROCEDURE — 82607 VITAMIN B-12: CPT

## 2024-02-14 PROCEDURE — 71045 X-RAY EXAM CHEST 1 VIEW: CPT

## 2024-02-14 PROCEDURE — 82746 ASSAY OF FOLIC ACID SERUM: CPT

## 2024-02-14 PROCEDURE — 82947 ASSAY GLUCOSE BLOOD QUANT: CPT

## 2024-02-14 PROCEDURE — 72141 MRI NECK SPINE W/O DYE: CPT

## 2024-02-14 PROCEDURE — 82330 ASSAY OF CALCIUM: CPT

## 2024-02-14 PROCEDURE — 86341 ISLET CELL ANTIBODY: CPT

## 2024-02-14 PROCEDURE — 82803 BLOOD GASES ANY COMBINATION: CPT

## 2024-02-14 PROCEDURE — 82306 VITAMIN D 25 HYDROXY: CPT

## 2024-02-14 PROCEDURE — 99232 SBSQ HOSP IP/OBS MODERATE 35: CPT

## 2024-02-14 PROCEDURE — 87637 SARSCOV2&INF A&B&RSV AMP PRB: CPT

## 2024-02-14 PROCEDURE — 85014 HEMATOCRIT: CPT

## 2024-02-14 PROCEDURE — 86140 C-REACTIVE PROTEIN: CPT

## 2024-02-14 PROCEDURE — 80048 BASIC METABOLIC PNL TOTAL CA: CPT

## 2024-02-14 PROCEDURE — 81001 URINALYSIS AUTO W/SCOPE: CPT

## 2024-02-14 PROCEDURE — 86789 WEST NILE VIRUS ANTIBODY: CPT

## 2024-02-14 PROCEDURE — 70551 MRI BRAIN STEM W/O DYE: CPT

## 2024-02-14 PROCEDURE — 85652 RBC SED RATE AUTOMATED: CPT

## 2024-02-14 PROCEDURE — 85018 HEMOGLOBIN: CPT

## 2024-02-14 PROCEDURE — 80053 COMPREHEN METABOLIC PANEL: CPT

## 2024-02-14 PROCEDURE — 86788 WEST NILE VIRUS AB IGM: CPT

## 2024-02-14 PROCEDURE — 83519 RIA NONANTIBODY: CPT

## 2024-02-14 PROCEDURE — 83735 ASSAY OF MAGNESIUM: CPT

## 2024-02-14 PROCEDURE — 85027 COMPLETE CBC AUTOMATED: CPT

## 2024-02-14 PROCEDURE — 84132 ASSAY OF SERUM POTASSIUM: CPT

## 2024-02-14 PROCEDURE — 84207 ASSAY OF VITAMIN B-6: CPT

## 2024-02-14 PROCEDURE — 86225 DNA ANTIBODY NATIVE: CPT

## 2024-02-14 PROCEDURE — 85025 COMPLETE CBC W/AUTO DIFF WBC: CPT

## 2024-02-14 PROCEDURE — 86618 LYME DISEASE ANTIBODY: CPT

## 2024-02-14 PROCEDURE — 82435 ASSAY OF BLOOD CHLORIDE: CPT

## 2024-02-14 PROCEDURE — 86255 FLUORESCENT ANTIBODY SCREEN: CPT

## 2024-02-14 PROCEDURE — 70450 CT HEAD/BRAIN W/O DYE: CPT | Mod: MA

## 2024-02-14 PROCEDURE — 83605 ASSAY OF LACTIC ACID: CPT

## 2024-02-14 PROCEDURE — 83036 HEMOGLOBIN GLYCOSYLATED A1C: CPT

## 2024-02-14 PROCEDURE — 36415 COLL VENOUS BLD VENIPUNCTURE: CPT

## 2024-02-14 PROCEDURE — 84425 ASSAY OF VITAMIN B-1: CPT

## 2024-02-14 PROCEDURE — 84100 ASSAY OF PHOSPHORUS: CPT

## 2024-02-14 RX ORDER — ATORVASTATIN CALCIUM 80 MG/1
40 TABLET, FILM COATED ORAL AT BEDTIME
Refills: 0 | Status: DISCONTINUED | OUTPATIENT
Start: 2024-02-14 | End: 2024-02-14

## 2024-02-14 RX ORDER — ATORVASTATIN CALCIUM 80 MG/1
1 TABLET, FILM COATED ORAL
Qty: 30 | Refills: 0
Start: 2024-02-14 | End: 2024-03-14

## 2024-02-14 RX ORDER — CHOLECALCIFEROL (VITAMIN D3) 125 MCG
1 CAPSULE ORAL
Qty: 30 | Refills: 0
Start: 2024-02-14 | End: 2024-03-14

## 2024-02-14 RX ADMIN — VALSARTAN 160 MILLIGRAM(S): 80 TABLET ORAL at 05:21

## 2024-02-14 RX ADMIN — Medication 2000 UNIT(S): at 11:16

## 2024-02-14 NOTE — DISCHARGE NOTE PROVIDER - CARE PROVIDER_API CALL
Angela Talamantes  Internal Medicine  [address] [place] [address] [address]  [place] [address]  Phone: [phone]  Fax: [phone]  Established Patient  Follow Up Time: 1 week

## 2024-02-14 NOTE — DISCHARGE NOTE NURSING/CASE MANAGEMENT/SOCIAL WORK - PATIENT PORTAL LINK FT
You can access the FollowMyHealth Patient Portal offered by [facility] by registering at the following website: [email or web address]. By joining [facility]s FollowMyHealth portal, you will also be able to view your health information using other applications (apps) compatible with our system.

## 2024-02-14 NOTE — PROGRESS NOTE ADULT - SUBJECTIVE AND OBJECTIVE BOX
NEUROLOGY FOLLOW-UP CONSULT NOTE    RFC: Right sided weakness    Interval history: No acute neurologic events overnight. Patient reports feeling well and improvement of weakness.    Meds:  MEDICATIONS  (STANDING):  atorvastatin 40 milliGRAM(s) Oral at bedtime  cholecalciferol 2000 Unit(s) Oral daily  valsartan 160 milliGRAM(s) Oral daily    MEDICATIONS  (PRN):  acetaminophen     Tablet .. 650 milliGRAM(s) Oral every 6 hours PRN Temp greater or equal to 38C (100.4F), Mild Pain (1 - 3)  aluminum hydroxide/magnesium hydroxide/simethicone Suspension 30 milliLiter(s) Oral every 4 hours PRN Dyspepsia  melatonin 3 milliGRAM(s) Oral at bedtime PRN Insomnia  ondansetron Injectable 4 milliGRAM(s) IV Push every 8 hours PRN Nausea and/or Vomiting      PMHx/PSHx/FHx/SHx:  Weakness    Dense breasts, unspecified    Dorsalgia, unspecified    Hyperlipidemia, unspecified    Metabolic syndrome    Pain in right knee    Pain in unspecified shoulder    Posterior tibial tendinitis, left leg    Prediabetes    Unilateral primary osteoarthritis, left knee    Unilateral primary osteoarthritis, right knee    Vitamin D deficiency, unspecified    Hypertension    Anxiety    Depression    Psychogenic nonepileptic seizure    Hypertension    Right sided weakness    SEIZURE    Vitamin D deficiency    Hyperlipidemia        Allergies:  shellfish (Rash)  penicillins (Anaphylaxis)      ROS: All systems negative except as documented in Interval history    O:  T(C): 36.5 (02-14-24 @ 12:26), Max: 36.8 (02-13-24 @ 20:14)  HR: 65 (02-14-24 @ 12:26) (56 - 65)  BP: 134/81 (02-14-24 @ 12:26) (130/77 - 148/91)  RR: 18 (02-14-24 @ 12:26) (18 - 18)  SpO2: 100% (02-14-24 @ 12:26) (98% - 100%)    Focused neurologic exam:  MS - AAO x3, speech fluent, rep/naming intact, follows commands, attn/conc/recent and remote memory/fund of knowledge WNL  CN - PERRLA, EOMI, VFF, face sens/str/hearing WNL b/l, tongue/palate midline, trap 5/5 b/l  Motor - Normal bulk/tone, 5/5 all  Sens - LT/temp/vib intact all  DTR's - 2+ all and downgoing b/l plantar response  Coord - FtN intact b/l  Gait and station - Not assessed due to fall risk    Pertinent labs/studies:    LABS:  cret        < from: CT Head No Cont (02.11.24 @ 19:35) >  IMPRESSION:  CT brain: No CT evidence of acute transcortical infarct, acute   intracranial hemorrhage, or mass effect.    CTA neck: No stenosis. No dissection.    CTA brain: No stenosis or aneurysm.    < end of copied text >    < from: MR Head No Cont (02.12.24 @ 19:49) >  FINDINGS: Multiple small rounded nonspecific foci of T2/FLAIR   hyperintensity are noted throughout the deepand periventricular white   matter of the cerebral hemispheres. There is no associated mass effect.   There is no evidence of acute ischemia on the diffusion-weighted images.    Ventricular size and configuration is unremarkable. Flow-voids are noted  throughout the major intracranial vessels, on the T2 weighted images,   consistent with their patency. The sellar region and posterior fossa   appear unremarkable.    The paranasal sinuses and tympanomastoid cavities are clear. Calvarial   signal is within normal limits. The orbits appear unremarkable.    IMPRESSION: No acute intracranial hemorrhage or evidence of acute   ischemia.    Multiple small rounded abnormal white matter foci of T2/FLAIR   prolongation for which the differential diagnosis is broad.    < end of copied text >    < from: MR Thoracic Spine No Cont (02.13.24 @ 16:34) >    FINDINGS:    The alignment appears preserved in the sagittal plane. The thoracic and   lumbar vertebrae appear preserved in height. The vertebral marrow signal   is within normal limits for age.    The thoracic spinal cord is unremarkable in caliber contour and signal   characteristics. The conus is appreciated at the L1-2 level      IMPRESSION: Unremarkable MR of the thoracic and lumbar regions. No   evidence of cord pathology    < end of copied text >    < from: MR Lumbar Spine No Cont (02.13.24 @ 16:34) >  FINDINGS:    The alignment appears preserved in the sagittal plane. The thoracic and   lumbar vertebrae appear preserved in height. The vertebral marrow signal   is within normal limits for age.    The thoracic spinal cord is unremarkable in caliber contour and signal   characteristics. The conus is appreciated at the L1-2 level      IMPRESSION: Unremarkable MR of the thoracic and lumbar regions. No   evidence of cord pathology    < end of copied text >    < from: MR Cervical Spine No Cont (02.12.24 @ 19:50) >    Loss of the normal cervical lordosis is seen    The vertebral body height appears normal    Small focus of increased signal seen involving the anterior inferior   corner of the C6 vertebral body. This could be compatible with hemangioma   versus focal area of fat.    Disc desiccation is seen involving the C2-3 C3-C4 C4-5 C5-6 C6-7 and   C7-T1 levels which is secondary chronic degenerative changes.    C2-3: Normal    C3-4: Bilateralhypertrophic facet joint changes. Mild to moderate   narrowing of the left neural foramen and severe moderate to severe   narrowing of of the right neural foramen    C4-5: Bilateral hypertrophic facet joint changes. Mild to moderate   narrowing of the right neural foramen    C5-6: Normal    C6-7: Bilateral hypertrophic facet joint changes. Mild to moderate   narrowing of the right neural foramen and moderate narrowing of the left   neural foramina    C7-T1: Disc bulge and bilateral hypertrophic facet joint changes. No   significant compromise of the spinal canal or either neural foramen    T1-2: Normal    The spinal cord demonstrates normal signal and caliber.    Evaluation of the paraspinal soft tissues appear normal.    IMPRESSION: Degenerative changes as described above.    < end of copied text >  
[facility] Division of Hospital Medicine  Colt Kay MD  Available via MS Teams    SUBJECTIVE / OVERNIGHT EVENTS: No acute events overnight. Pt seen and examined at bedside. Overall feels improved, less R weakness but not back to baseline.     ADDITIONAL REVIEW OF SYSTEMS:    MEDICATIONS  (STANDING):  valsartan 160 milliGRAM(s) Oral daily    MEDICATIONS  (PRN):  acetaminophen     Tablet .. 650 milliGRAM(s) Oral every 6 hours PRN Temp greater or equal to 38C (100.4F), Mild Pain (1 - 3)  aluminum hydroxide/magnesium hydroxide/simethicone Suspension 30 milliLiter(s) Oral every 4 hours PRN Dyspepsia  melatonin 3 milliGRAM(s) Oral at bedtime PRN Insomnia  ondansetron Injectable 4 milliGRAM(s) IV Push every 8 hours PRN Nausea and/or Vomiting      I&O's Summary    12 Feb 2024 07:01  -  13 Feb 2024 07:00  --------------------------------------------------------  IN: 200 mL / OUT: 300 mL / NET: -100 mL    13 Feb 2024 07:01  -  13 Feb 2024 12:56  --------------------------------------------------------  IN: 180 mL / OUT: 0 mL / NET: 180 mL        PHYSICAL EXAM:  Vital Signs Last 24 Hrs  T(C): 36.9 (13 Feb 2024 11:10), Max: 36.9 (13 Feb 2024 11:10)  T(F): 98.4 (13 Feb 2024 11:10), Max: 98.4 (13 Feb 2024 11:10)  HR: 61 (13 Feb 2024 11:10) (60 - 62)  BP: 143/85 (13 Feb 2024 11:10) (134/84 - 143/85)    RR: 18 (13 Feb 2024 11:10) (16 - 18)  SpO2: 97% (13 Feb 2024 11:10) (94% - 98%)    Parameters below as of 13 Feb 2024 11:10  Patient On (Oxygen Delivery Method): room air      CONSTITUTIONAL: NAD, sitting up in bed  RESPIRATORY: Normal respiratory effort; lungs are clear to auscultation bilaterally  CARDIOVASCULAR: Regular rate and rhythm, normal S1 and S2, no murmur/rub/gallop; No lower extremity edema  ABDOMEN: Nontender to palpation, normoactive bowel sounds, no rebound/guarding  MUSCULOSKELETAL: no clubbing or cyanosis of digits; no joint swelling or tenderness to palpation  PSYCH: A+O to person, place, and time; affect appropriate  NEUROLOGY: CN 2-12 are intact and symmetric; no gross sensory deficits   SKIN: No rashes; no palpable lesions    LABS:                        12.0   5.18  )-----------( 258      ( 12 Feb 2024 06:50 )             37.9     02-12    145  |  108  |  13  ----------------------------<  80  4.4   |  23  |  0.89    Ca    9.0      12 Feb 2024 06:50  Phos  4.4     02-12  Mg     2.0     02-12    TPro  7.6  /  Alb  3.7  /  TBili  0.2  /  DBili  x   /  AST  34  /  ALT  22  /  AlkPhos  89  02-11          Urinalysis Basic - ( 12 Feb 2024 06:50 )    Color: x / Appearance: x / SG: x / pH: x  Gluc: 80 mg/dL / Ketone: x  / Bili: x / Urobili: x   Blood: x / Protein: x / Nitrite: x   Leuk Esterase: x / RBC: x / WBC x   Sq Epi: x / Non Sq Epi: x / Bacteria: x        Culture - Urine (collected 11 Feb 2024 17:34)  Source: Clean Catch Clean Catch (Midstream)  Final Report (12 Feb 2024 20:07):    <10,000 CFU/mL Normal Urogenital Chelle        RADIOLOGY & ADDITIONAL TESTS:  New Results Reviewed Today: MRI head and C spine  New Imaging Personally Reviewed Today:  New Electrocardiogram Personally Reviewed Today:  Prior or Outpatient Records Reviewed Today:    COMMUNICATION:  Care Discussed with Consultants/Other Providers and Details of Discussion: Discussed with ACP, neurology  Discussions with Patient/Family:  PCP Communication:

## 2024-02-14 NOTE — DISCHARGE NOTE PROVIDER - NSDCFUADDAPPT_GEN_ALL_CORE_FT
Neurology: [address]. 1-2 weeks after discharge by calling [phone] to schedule this appointment

## 2024-02-14 NOTE — DISCHARGE NOTE PROVIDER - HOSPITAL COURSE
HPI:  53F PMHx HTN and recently dx'd PNES. Presenting w/ CP radiating to b/l shoulder and neck, back pain, dizziness and right-sided weakness and numbness x2days. She felt SOB on 2/11, called her family, who noticed she was slurring speech.     In ED, pt was initially hypertensive but became normotensive spontaneously. No fever, HR wnl, and on RA.     Eval by neuro in the ED, low suspicion for acute stroke.   Rec MRI c-spine w/o, MR brain w/o.   CBC unremarkable, CMP unremarkable. trop <6 x2, UA unremarkable.   Rvp neg for covid, flu a/b, and rsv.     Pt was code stroke. CTH noncon and CTA head/neck neg for any acute infarct or other acute path.     Of note, pt was recently admitted at [facility] 1/26-2/1 with witnessed convulsions, had w/u for stroke which was negative. EEG found no epileptic activity. Pt was dc'd with diagnosis of PNES, likely d/t stress. Pt was dc'd with bactrim for UTI and advised to f/u with psychiatrist. Since discharge, patient saw her cardiologist Dr. Mina Perez, and currently has heart monitor.  Also saw neurologist Dr. Damian Barry on 2/10.   *TSH 2.33 on 1/26/24     (12 Feb 2024 01:26)    Hospital Course: Pt completed MR jeanette and entire spine. No explanation found on imaging. CT head with nonspecific rounded abnormal white matter foci on T2/flair, possibly related to chronic microvascular ischemic changes. Also found to be vitamin d deficient.     Important Medication Changes and Reason: Pt started on atorvastatin for secondary prevention and vitamin d supplements.     Active or Pending Issues Requiring Follow-up: neuro, pcp    Advanced Directives:   [X] Full code  [ ] DNR  [ ] Hospice    Discharge Diagnoses:  weakness   hyperlipidemia  vitamin d deficiency

## 2024-02-14 NOTE — DISCHARGE NOTE PROVIDER - NSDCMRMEDTOKEN_GEN_ALL_CORE_FT
atorvastatin 40 mg oral tablet: 1 tab(s) orally once a day (at bedtime)  cholecalciferol 50 mcg (2000 intl units) oral tablet: 1 tab(s) orally once a day  valsartan 160 mg oral tablet: 1 tab(s) orally once a day

## 2024-02-14 NOTE — DISCHARGE NOTE PROVIDER - NSDCFUSCHEDAPPT_GEN_ALL_CORE_FT
[facility]  [facility] [address] [facility] [place]  Scheduled Appointment: 02/16/2024

## 2024-02-14 NOTE — PROGRESS NOTE ADULT - ASSESSMENT
LIZZIE VANCE is a 53y (1970) left handed woman with a PMHx significant for HTN, PNES presenting with chest pain that radiated into her breast with pain to the bilateral shoulders and neck, back pain, SOB, lightheadedness, right sided weakness and numbness. Patient states she started having symptoms on 2/10/24, noticed she felt lightheaded when walking. Then endorses weakness and numbness. Symptoms have been persistent since then. Patient reports today while she was laying down began to experience shortness of breath, called her family members who noted her to have slurred speech.  Family at bedside feels her speech is now improved.  Recently admitted to [facility] in Jan 2024 for seizure like activity with multiple non-epileptic events captured on EEG, diagnosed with PNES thought to be due to stress at work. Patient saw her cardiologist 3 days ago Dr. Mina Perez, his Holter monitor.  Also saw neurologist yesterday Dr. Damian Barry.  Patient denies headache, dizziness, word finding difficulty. Denies any similar symptoms previously.     Impression:  - Symptoms seem more consistent with functional process in the setting of peripheral or cardiac cause of dizziness ( given prior diagnosis of PNES). No acute intracranial pathology on MR brain. Cervical degenerative changes seen on MR C spine. Unremarkable MR of the thoracic and lumbar regions. No evidence of cord pathology.  2/14: Pt is AOX3, strength 5/5 throughout b/l. No focal neurological deficit on exam    Recommendations  - MRI brain, c-spine, t-spine, and l-spine w/ and w/o : results above. Stable.  - patient can follow up with spine service outpatient for cervical degenerative changes.  - Low vitamin D, please replete with ergocalciferol 50,000 units PO weekly for 4 weeks and recheck level.  - elevated LDL, management per primary team  - PT/OT as needed  - reviewed labs: ESR (wnl), CRP (wnl), B12 (wnl), folate (wnl), TSH (wnl), free T4 (wnl), lyme (wnl), CK (WNL), pending: B1, B6, copper, WNV, autoimmune encephalopathy panel, ACE, MIKE, dsDNA  - Continue to address above medical problems, as you are doing  - plans discussed with primary medical team by bedside and patient & daughter.  - After discharge, patient can follow up with general neurology at [address] [address], [facility]. 1-2 weeks after discharge by calling [phone] to schedule this appointment  - No further inpatient neurology recommendation, will sign off, please call consult service [phone] with any questions.      Plans discussed with neurology attending, Dr. Segovia  
53F HTN, recently diagnosed psychogenic non-epileptic seizures (PNES) presented with chest discomfort and R sided weakness.

## 2024-02-14 NOTE — DISCHARGE NOTE PROVIDER - NSDCCPCAREPLAN_GEN_ALL_CORE_FT
PRINCIPAL DISCHARGE DIAGNOSIS  Diagnosis: Weakness  Assessment and Plan of Treatment: You had right sided weakness that improved. MRIs of your spine showed some degenerative spine changes in the neck which may be related to your symptoms. Follow-up with neurology on discharge.      SECONDARY DISCHARGE DIAGNOSES  Diagnosis: Vitamin D deficiency  Assessment and Plan of Treatment: You have low levels of vitamin D and we have prescribed a vitamin d supplement. Continue to take for a minimum of 3 months and recheck your vitamin d level with your primary care doctor.    Diagnosis: Hyperlipidemia  Assessment and Plan of Treatment: You have high cholesterol and we started you on a cholesterol lowering medication. Please take your atorvastatin 40 mg every evening before bed and follow-up with your PCP in 3 months to recheck your cholesterol and determine if any dosage adjustments are needed.

## 2024-02-15 LAB
ACE SERPL-CCNC: 18 U/L — SIGNIFICANT CHANGE UP (ref 14–82)
ANA TITR SER: NEGATIVE — SIGNIFICANT CHANGE UP

## 2024-02-16 ENCOUNTER — APPOINTMENT (OUTPATIENT)
Dept: INTERNAL MEDICINE | Facility: CLINIC | Age: 54
End: 2024-02-16
Payer: COMMERCIAL

## 2024-02-16 VITALS
OXYGEN SATURATION: 99 % | DIASTOLIC BLOOD PRESSURE: 85 MMHG | HEIGHT: 62 IN | WEIGHT: 180 LBS | TEMPERATURE: 98.1 F | BODY MASS INDEX: 33.13 KG/M2 | HEART RATE: 73 BPM | SYSTOLIC BLOOD PRESSURE: 165 MMHG

## 2024-02-16 PROBLEM — F44.5 CONVERSION DISORDER WITH SEIZURES OR CONVULSIONS: Chronic | Status: ACTIVE | Noted: 2024-02-12

## 2024-02-16 PROCEDURE — 99496 TRANSJ CARE MGMT HIGH F2F 7D: CPT

## 2024-02-16 RX ORDER — UBIDECARENONE/VIT E ACET 100MG-5
50 MCG CAPSULE ORAL
Qty: 90 | Refills: 3 | Status: DISCONTINUED | COMMUNITY
Start: 2022-11-29 | End: 2024-02-16

## 2024-02-16 RX ORDER — DICLOFENAC SODIUM 75 MG/1
75 TABLET, DELAYED RELEASE ORAL TWICE DAILY
Qty: 30 | Refills: 0 | Status: DISCONTINUED | COMMUNITY
Start: 2024-02-09 | End: 2024-02-16

## 2024-02-16 RX ORDER — NITROFURANTOIN (MONOHYDRATE/MACROCRYSTALS) 25; 75 MG/1; MG/1
100 CAPSULE ORAL
Refills: 0 | Status: DISCONTINUED | COMMUNITY
End: 2024-02-16

## 2024-02-16 RX ORDER — VALSARTAN 160 MG/1
160 TABLET, COATED ORAL
Qty: 90 | Refills: 3 | Status: ACTIVE | COMMUNITY
Start: 2024-02-16 | End: 1900-01-01

## 2024-02-16 RX ORDER — MULTIVIT-MIN/IRON/FOLIC ACID/K 18-600-40
50 MCG CAPSULE ORAL
Qty: 30 | Refills: 3 | Status: ACTIVE | COMMUNITY
Start: 2024-02-16 | End: 1900-01-01

## 2024-02-16 RX ORDER — AMLODIPINE AND VALSARTAN 10; 160 MG/1; MG/1
10-160 TABLET, FILM COATED ORAL
Qty: 90 | Refills: 3 | Status: DISCONTINUED | COMMUNITY
Start: 2022-11-28 | End: 2024-02-16

## 2024-02-16 RX ORDER — LOSARTAN POTASSIUM 100 MG/1
TABLET, FILM COATED ORAL
Refills: 0 | Status: DISCONTINUED | COMMUNITY
End: 2024-02-16

## 2024-02-16 RX ORDER — AMLODIPINE BESYLATE 10 MG/1
10 TABLET ORAL
Refills: 0 | Status: DISCONTINUED | COMMUNITY
End: 2024-02-16

## 2024-02-16 RX ORDER — METRONIDAZOLE 500 MG/1
500 TABLET ORAL
Refills: 0 | Status: DISCONTINUED | COMMUNITY
End: 2024-02-16

## 2024-02-16 RX ORDER — HYDROCHLOROTHIAZIDE 12.5 MG/1
12.5 TABLET ORAL
Qty: 90 | Refills: 3 | Status: DISCONTINUED | COMMUNITY
Start: 2023-06-28 | End: 2024-02-16

## 2024-02-16 RX ORDER — OMEPRAZOLE 40 MG/1
40 CAPSULE, DELAYED RELEASE ORAL
Refills: 0 | Status: DISCONTINUED | COMMUNITY
End: 2024-02-16

## 2024-02-16 RX ORDER — LOSARTAN POTASSIUM AND HYDROCHLOROTHIAZIDE 25; 100 MG/1; MG/1
100-25 TABLET ORAL
Refills: 0 | Status: DISCONTINUED | COMMUNITY
End: 2024-02-16

## 2024-02-16 RX ORDER — HYDROCHLOROTHIAZIDE 12.5 MG/1
12.5 CAPSULE ORAL
Qty: 90 | Refills: 3 | Status: ACTIVE | COMMUNITY
Start: 2024-02-16 | End: 1900-01-01

## 2024-02-18 NOTE — PLAN
[FreeTextEntry1] : Patient admitted for seizures  - had extensive work-up including CTA, EEG and MRI brain without active episodes.  No further episodes since discharge. Had total of 5-7 episodes while hospitalized - described as generalized tonic-clonic seizures. Only    Per patient - she had initial episode at [facility] while working and then had multiple convulsive episodes in ED and during hospitalization. Patient reports that she admittedly was not checking her blood pressure regularly - and was intermittently compliant with her medications.   Of note, UA positive and UC grew E. coli - for which she was prescribed bactrim - has since finished course of abx.  #HFU #New onset seizures  Neurology eval pending   #Chest pain  Saw cardiology at [facility] for new onset chest pain - currently wearing heart monitor  Eval pending

## 2024-02-18 NOTE — PHYSICAL EXAM
[Normal] : no respiratory distress, lungs were clear to auscultation bilaterally and no accessory muscle use [23162 - High Complexity requires an extensive number of possible diagnoses and/or the management options, extensive complexity of the medical data (tests, etc.) to be reviewed, and a high risk of significant complications, morbidity, and/or mortality as w] : High Complexity

## 2024-02-18 NOTE — HISTORY OF PRESENT ILLNESS
[Post-hospitalization from ___ Hospital] : Post-hospitalization from [unfilled] Hospital [Admitted on: ___] : The patient was admitted on [unfilled] [Discharged on ___] : discharged on [unfilled] [Discharge Summary] : discharge summary [Pertinent Labs] : pertinent labs [Radiology Findings] : radiology findings [Discharge Med List] : discharge medication list [Med Reconciliation] : medication reconciliation has been completed [Patient Contacted By: ____] : and contacted by [unfilled] [FreeTextEntry2] : Starting 2/11/24 began having cognitive difficulty, generalized weakness, slurred speech - repeat stroke work-up negative. SBP also high.   Patient admitted for seizures  - had extensive work-up including CTA, EEG and MRI brain without active episodes.  No further episodes since discharge. Had total of 5-7 episodes while hospitalized - described as generalized tonic-clonic seizures.   Per patient - she had initial episode at [facility] while working and then had multiple convulsive episodes in ED and during hospitalization. Patient reports that she admittedly was not checking her blood pressure regularly - and was intermittently compliant with her medications.   Of note, UA positive and UC grew E. coli - for which she was prescribed bactrim - has since finished course of abx.     Discharge Date 01-Feb-2024 Admission Date 26-Jan-2024 13:37 Reason for Admission seizure Hospital Course  53-year-old with past medical history of hypertension, prediabetes, vitamin D deficiency, ?per outpatient records hyperlipidemia ( (LDL 93 11/2022) presenting as a code stroke for AMS + aphasia s/p seizures. Per history from EMS, was working at [place] when was witnessed by staff to have generalized convulsions lasting roughly 2 minutes, at the time BP reportedly in 240s. Upon arrival of EMS, witnessed to have another episode 30 seconds of convulsions no incontinence noted, . On presentation, /116, noted to have another episode of convulsions, head turned briefly to left lasting less than 30 seconds in duration by ED + neuro team. NIHSS: 26 -> 15 upon re-evaluation following imaging Upon re-evaluation at bedside after imaging patient able to follow simple commands raise UE antigravity symmetrically antigravity for several seconds. No recent infections/illnesses, recent injuries, recent travel, recent reported changes in medications per spouse. pt was admitted to EMU to evaluate for seizures.  # Concern for seizures- Episode at work, followed by recurrent episodes of seizure like activity in the ED s/p ativan 4mg x1 for recurrent seizure CT head/perfusion as above: No evidence of significant stenosis, occlusion or dissection bilateral extracranial carotid arteries. No evidence of large vessel occlusion, definitive aneurysm or vascular malformation intracranial circulation. Appreciate Neurology recs -  Multiple episodes of convulsions with c/f seizures, also noted to have elevated BPs to 200s outside, in ED has been 180s-190s. Etiology of new onset seizure-like activity is unknown and patient will benefit from further workup to r/o treatable etiologies. s/p Keppra load followed by keppra 1gm BID. EEG shows no epileptiform abnormalities or electrographic seizure. MRI- No acute infarct, hemorrhage, or mass. No potential seizure focus identified. Chronic white matter small vessel changes.Appreciate Neuro recs- Advise to dc keppra as no seizures noted on EEG. As per neuro- no restriction to driving. Advise Psych eval. Pt was seen by Psych on 1/31-defer medications at this time, may benefit from counseling, resources as below. F/u with: [facility] [address] [facility], [address] Floor, [place] [address].   # Hypertensive urgency-Home medication: amlodipine-valsartan . Hypertense in the ED - likely transiently from seizure? vs hypertensive emergency SBP increased to 186/113 during the episode, decreased on its own. SBP in 140s. C/w valsartan 160, c/w labetalol PRN If persistently hypertensive, can add back amlodipine  #UTI- Large LE. UC- Ecoli. Started on Bactrim as pt with PCN allergy.  # Multiple thyroid nodules-  Plan: Seen on CT. TSH 2.33. OP thyroid US.  Med Reconciliation: Override IMPROVE-DD recommendations due to: IMPROVE-DD Application Not Available Recommended Post-Discharge VTE Prophylaxis IMPROVE-DD Application Not Available Medication Reconciliation Status Admission Reconciliation is Completed Discharge Reconciliation is Completed Discharge Medications sulfamethoxazole-trimethoprim 800 mg-160 mg oral tablet: 1 tab(s) orally 2 times a day valsartan 160 mg oral tablet: 1 tab(s) orally once a day , , Care Plan/Procedures: Discharge Diagnoses, Assessment and Plan of Treatment PRINCIPAL DISCHARGE DIAGNOSIS Diagnosis: Seizure Assessment and Plan of Treatment: you had an episode at work, followed by recurrent episodes of seizure like activity in the ED.  You received ativan for recurrent seizure Your CT head/perfusion showed no evidence of significant stenosis, occlusion or dissection bilateral extracranial carotid arteries. No evidence of large vessel occlusion, definitive aneurysm or vascular malformation intracranial circulation. You receievd a Keppra load followed by keppra 1gm BID. EEG showed no epileptiform abnormalities or electrographic seizure and Keppra was discontinued    SECONDARY DISCHARGE DIAGNOSES Diagnosis: HTN (hypertension) Assessment and Plan of Treatment: Your blood pressure was elevated, mostly when you were having shaking. Please take your meds as prescribed  Diagnosis: Multiple thyroid nodules Assessment and Plan of Treatment: Multiple thyroid nodules were seen on CT. Your  TSH was WNL.  Please follow up with your PMD for thyroid US. Discharge Procedures, Findings and Treatment PRINCIPAL PROCEDURE Procedure: MR brain Findings and Treatment: Your MRI brain revealed: No acute infarct, hemorrhage, or mass. No potential seizure focus identified. Chronic white matter small vessel changes.

## 2024-02-19 LAB
PYRIDOXAL PHOS SERPL-MCNC: 13.9 UG/L — SIGNIFICANT CHANGE UP (ref 3.4–65.2)
VIT B1 SERPL-MCNC: 71.3 NMOL/L — SIGNIFICANT CHANGE UP (ref 66.5–200)
WNV IGG TITR FLD: POSITIVE
WNV IGM SPEC QL: NEGATIVE — SIGNIFICANT CHANGE UP

## 2024-02-23 LAB
AMPA-RECEPTOR ANTIBODY BY CBA, SERUM: NEGATIVE — SIGNIFICANT CHANGE UP
AMPHIPHYSIN AB TITR SER: NEGATIVE — SIGNIFICANT CHANGE UP
CASPR2-IGG CBA, SERUM: NEGATIVE — SIGNIFICANT CHANGE UP
CRMP-5-IGG WESTERN BLOT: NEGATIVE — SIGNIFICANT CHANGE UP
DPPX IGG SERPL QL IF: NEGATIVE — SIGNIFICANT CHANGE UP
GABA-B-RECEPTOR ANTIBODY BY CBA, SERUM: NEGATIVE — SIGNIFICANT CHANGE UP
GAD65 AB SER-MCNC: 0.22 NMOL/L — HIGH
GFAP ALPHA IGG SER QL IF: NEGATIVE — SIGNIFICANT CHANGE UP
GLIAL NUC TYPE 1 AB TITR SER: NEGATIVE — SIGNIFICANT CHANGE UP
HU1 AB TITR SER: NEGATIVE — SIGNIFICANT CHANGE UP
HU2 AB TITR SER IF: NEGATIVE — SIGNIFICANT CHANGE UP
HU3 AB TITR SER: NEGATIVE — SIGNIFICANT CHANGE UP
IFA NOTES: SIGNIFICANT CHANGE UP
IMMUNOLOGIST REVIEW: SIGNIFICANT CHANGE UP
LGI1-IGG CBA, SERUM: NEGATIVE — SIGNIFICANT CHANGE UP
MGLUR1 IGG SER QL IF: NEGATIVE — SIGNIFICANT CHANGE UP
NEUROCHONDRIN AB SERPL QL IF: NEGATIVE — SIGNIFICANT CHANGE UP
NMDAR1 IGG SER QL CBA IFA: NEGATIVE — SIGNIFICANT CHANGE UP
PCA-1 AB TITR SER: NEGATIVE — SIGNIFICANT CHANGE UP
PCA-2 AB TITR SER: NEGATIVE — SIGNIFICANT CHANGE UP
PCA-TR AB TITR SER: NEGATIVE — SIGNIFICANT CHANGE UP

## 2024-02-29 ENCOUNTER — APPOINTMENT (OUTPATIENT)
Dept: INTERNAL MEDICINE | Facility: CLINIC | Age: 54
End: 2024-02-29
Payer: COMMERCIAL

## 2024-02-29 DIAGNOSIS — I10 ESSENTIAL (PRIMARY) HYPERTENSION: ICD-10-CM

## 2024-02-29 PROCEDURE — 99215 OFFICE O/P EST HI 40 MIN: CPT | Mod: 95

## 2024-02-29 PROCEDURE — G2211 COMPLEX E/M VISIT ADD ON: CPT

## 2024-02-29 NOTE — PLAN
[FreeTextEntry1] : Patient admitted for seizures  - had extensive work-up including CTA, EEG and MRI brain without active episodes.  No further episodes since discharge. Had total of 5-7 episodes while hospitalized - described as generalized tonic-clonic seizures. Only    Per patient - she had initial episode at [facility] while working and then had multiple convulsive episodes in ED and during hospitalization. Patient reports that she admittedly was not checking her blood pressure regularly - and was intermittently compliant with her medications.   Of note, UA positive and UC grew E. coli - for which she was prescribed bactrim - has since finished course of abx.  #HFU #New onset seizures  Neurology eval pending   #Chest pain  Saw cardiology at [facility] for new onset chest pain - currently wearing heart monitor  Eval pending  #HTN Patient with history of hypertension. BP today acceptable Patient to continue current regimen as prescribed. Patient instructed to continue monitoring blood pressure at home and to keep a journal. Will continue to monitor patient's bp and adjust his hypertensive regimen as needed.  #Knee pain b/l PT referral  #Tentative return to work as of now 3/14/24  #Disability Will follow-up 3/9 - with repeat blood work  Neurology and cardiology eval ongoing  Disability paperwork requested

## 2024-02-29 NOTE — HISTORY OF PRESENT ILLNESS
[Home] : at home, [unfilled] , at the time of the visit. [Medical Office: ([facility])___] : at the medical office located in  [Verbal consent obtained from patient] : the patient, [unfilled] [FreeTextEntry1] : Patient presents today for follow-up of chronic medical conditions.  [de-identified] : Interim -  Has been adjusting her diet to see if that will make improvement in her diet  Smaller portions, less sugar and carbs  Started PT  No recent symptoms of seizure, CVA - neurologically stable  Feels physically improved    Prior -  Starting 2/11/24 began having cognitive difficulty, generalized weakness, slurred speech - repeat stroke work-up negative. SBP also high.  Patient admitted for seizures - had extensive work-up including CTA, EEG and MRI brain without active episodes. No further episodes since discharge. Had total of 5-7 episodes while hospitalized - described as generalized tonic-clonic seizures.   Per patient - she had initial episode at [facility] while working and then had multiple convulsive episodes in ED and during hospitalization. Patient reports that she admittedly was not checking her blood pressure regularly - and was intermittently compliant with her medications.  Of note, UA positive and UC grew E. coli - for which she was prescribed bactrim - has since finished course of abx.

## 2024-03-04 ENCOUNTER — APPOINTMENT (OUTPATIENT)
Dept: INTERNAL MEDICINE | Facility: CLINIC | Age: 54
End: 2024-03-04

## 2024-03-06 ENCOUNTER — APPOINTMENT (OUTPATIENT)
Dept: INTERNAL MEDICINE | Facility: CLINIC | Age: 54
End: 2024-03-06
Payer: COMMERCIAL

## 2024-03-06 PROCEDURE — 36415 COLL VENOUS BLD VENIPUNCTURE: CPT

## 2024-03-07 LAB
25(OH)D3 SERPL-MCNC: 35.7 NG/ML
ALBUMIN SERPL ELPH-MCNC: 4.2 G/DL
ALP BLD-CCNC: 90 U/L
ALT SERPL-CCNC: 15 U/L
ANION GAP SERPL CALC-SCNC: 14 MMOL/L
AST SERPL-CCNC: 21 U/L
BASOPHILS # BLD AUTO: 0.03 K/UL
BASOPHILS NFR BLD AUTO: 0.6 %
BILIRUB SERPL-MCNC: 0.4 MG/DL
BUN SERPL-MCNC: 13 MG/DL
CALCIUM SERPL-MCNC: 10.2 MG/DL
CHLORIDE SERPL-SCNC: 102 MMOL/L
CHOLEST SERPL-MCNC: 117 MG/DL
CO2 SERPL-SCNC: 23 MMOL/L
CREAT SERPL-MCNC: 0.8 MG/DL
EGFR: 89 ML/MIN/1.73M2
EOSINOPHIL # BLD AUTO: 0.02 K/UL
EOSINOPHIL NFR BLD AUTO: 0.4 %
ESTIMATED AVERAGE GLUCOSE: 123 MG/DL
FERRITIN SERPL-MCNC: 65 NG/ML
FOLATE SERPL-MCNC: 16.4 NG/ML
GLUCOSE SERPL-MCNC: 79 MG/DL
HBA1C MFR BLD HPLC: 5.9 %
HCT VFR BLD CALC: 41.5 %
HDLC SERPL-MCNC: 51 MG/DL
HGB BLD-MCNC: 13.2 G/DL
IMM GRANULOCYTES NFR BLD AUTO: 0.4 %
IRON SATN MFR SERPL: 21 %
IRON SERPL-MCNC: 69 UG/DL
LDLC SERPL CALC-MCNC: 52 MG/DL
LYMPHOCYTES # BLD AUTO: 2.42 K/UL
LYMPHOCYTES NFR BLD AUTO: 46.9 %
MAN DIFF?: NORMAL
MCHC RBC-ENTMCNC: 27.3 PG
MCHC RBC-ENTMCNC: 31.8 GM/DL
MCV RBC AUTO: 85.9 FL
MONOCYTES # BLD AUTO: 0.43 K/UL
MONOCYTES NFR BLD AUTO: 8.3 %
NEUTROPHILS # BLD AUTO: 2.24 K/UL
NEUTROPHILS NFR BLD AUTO: 43.4 %
NONHDLC SERPL-MCNC: 66 MG/DL
PLATELET # BLD AUTO: 321 K/UL
POTASSIUM SERPL-SCNC: 4.1 MMOL/L
PROT SERPL-MCNC: 8 G/DL
RBC # BLD: 4.83 M/UL
RBC # FLD: 13.5 %
SODIUM SERPL-SCNC: 139 MMOL/L
TIBC SERPL-MCNC: 324 UG/DL
TRIGL SERPL-MCNC: 64 MG/DL
TSH SERPL-ACNC: 1.57 UIU/ML
UIBC SERPL-MCNC: 254 UG/DL
WBC # FLD AUTO: 5.16 K/UL

## 2024-03-11 ENCOUNTER — APPOINTMENT (OUTPATIENT)
Dept: INTERNAL MEDICINE | Facility: CLINIC | Age: 54
End: 2024-03-11
Payer: COMMERCIAL

## 2024-03-11 VITALS
WEIGHT: 178 LBS | OXYGEN SATURATION: 99 % | TEMPERATURE: 97 F | DIASTOLIC BLOOD PRESSURE: 85 MMHG | SYSTOLIC BLOOD PRESSURE: 134 MMHG | HEIGHT: 62 IN | BODY MASS INDEX: 32.76 KG/M2 | HEART RATE: 78 BPM

## 2024-03-11 DIAGNOSIS — R73.03 PREDIABETES.: ICD-10-CM

## 2024-03-11 PROCEDURE — G0447 BEHAVIOR COUNSEL OBESITY 15M: CPT | Mod: 59

## 2024-03-11 PROCEDURE — G2211 COMPLEX E/M VISIT ADD ON: CPT

## 2024-03-11 PROCEDURE — 99215 OFFICE O/P EST HI 40 MIN: CPT

## 2024-03-11 NOTE — COUNSELING
[Potential consequences of obesity discussed] : Potential consequences of obesity discussed [Benefits of weight loss discussed] : Benefits of weight loss discussed [Encouraged to increase physical activity] : Encouraged to increase physical activity [Encouraged to use exercise tracking device] : Encouraged to use exercise tracking device [Target Wt Loss Goal ___] : Weight Loss Goals: Target weight loss goal [unfilled] lbs [Weigh Self Weekly] : weigh self weekly [FreeTextEntry4] : 20 [FreeTextEntry2] : No saturated fats Increase aerobic exercise

## 2024-03-11 NOTE — HISTORY OF PRESENT ILLNESS
[FreeTextEntry1] : Patient presents today for follow-up of chronic medical conditions.  [de-identified] : Interim -  Working with a nutrition  - doing well overall  Feels that she needs help with accountability to ensure long term weight loss Doing diet + exercise with  that has created plans for her  Prior -  Has been adjusting her diet to see if that will make improvement in her diet  Smaller portions, less sugar and carbs  Started PT  No recent symptoms of seizure, CVA - neurologically stable  Feels physically improved    Prior -  Starting 2/11/24 began having cognitive difficulty, generalized weakness, slurred speech - repeat stroke work-up negative. SBP also high.  Patient admitted for seizures - had extensive work-up including CTA, EEG and MRI brain without active episodes. No further episodes since discharge. Had total of 5-7 episodes while hospitalized - described as generalized tonic-clonic seizures.   Per patient - she had initial episode at [facility] while working and then had multiple convulsive episodes in ED and during hospitalization. Patient reports that she admittedly was not checking her blood pressure regularly - and was intermittently compliant with her medications.  Of note, UA positive and UC grew E. coli - for which she was prescribed bactrim - has since finished course of abx.

## 2024-03-11 NOTE — PLAN
[FreeTextEntry1] : Patient admitted for seizures  - had extensive work-up including CTA, EEG and MRI brain without active episodes.  No further episodes since discharge. Had total of 5-7 episodes while hospitalized - described as generalized tonic-clonic seizures. Only    Per patient - she had initial episode at [facility] while working and then had multiple convulsive episodes in ED and during hospitalization. Patient reports that she admittedly was not checking her blood pressure regularly - and was intermittently compliant with her medications.   Of note, UA positive and UC grew E. coli - for which she was prescribed bactrim - has since finished course of abx.  #HFU #New onset seizures  Neurology eval pending   #Chest pain  Saw cardiology at [facility] for new onset chest pain - currently wearing heart monitor  Eval pending  #HTN Patient with history of hypertension. BP today acceptable Patient to continue current regimen as prescribed. Patient instructed to continue monitoring blood pressure at home and to keep a journal. Will continue to monitor patient's bp and adjust his hypertensive regimen as needed.  #Knee pain b/l PT referral  #Tentative return to work as of now 3/14/24  #Disability Will follow-up 3/9 - with repeat blood work  Neurology and cardiology eval ongoing  Disability paperwork requested

## 2024-04-10 ENCOUNTER — APPOINTMENT (OUTPATIENT)
Dept: INTERNAL MEDICINE | Facility: CLINIC | Age: 54
End: 2024-04-10
Payer: COMMERCIAL

## 2024-04-10 PROCEDURE — P9615: CPT

## 2024-04-11 ENCOUNTER — LABORATORY RESULT (OUTPATIENT)
Age: 54
End: 2024-04-11

## 2024-04-11 ENCOUNTER — NON-APPOINTMENT (OUTPATIENT)
Age: 54
End: 2024-04-11

## 2024-04-11 ENCOUNTER — APPOINTMENT (OUTPATIENT)
Dept: INTERNAL MEDICINE | Facility: CLINIC | Age: 54
End: 2024-04-11
Payer: COMMERCIAL

## 2024-04-11 VITALS
WEIGHT: 178 LBS | HEIGHT: 62 IN | BODY MASS INDEX: 32.76 KG/M2 | TEMPERATURE: 98 F | HEART RATE: 81 BPM | OXYGEN SATURATION: 100 % | SYSTOLIC BLOOD PRESSURE: 134 MMHG | DIASTOLIC BLOOD PRESSURE: 83 MMHG

## 2024-04-11 DIAGNOSIS — Z01.818 ENCOUNTER FOR OTHER PREPROCEDURAL EXAMINATION: ICD-10-CM

## 2024-04-11 LAB
ALBUMIN SERPL ELPH-MCNC: 4 G/DL
ALP BLD-CCNC: 79 U/L
ALT SERPL-CCNC: 13 U/L
ANION GAP SERPL CALC-SCNC: 14 MMOL/L
AST SERPL-CCNC: 19 U/L
BASOPHILS # BLD AUTO: 0.02 K/UL
BASOPHILS NFR BLD AUTO: 0.4 %
BILIRUB SERPL-MCNC: 0.2 MG/DL
BUN SERPL-MCNC: 13 MG/DL
CALCIUM SERPL-MCNC: 9.5 MG/DL
CHLORIDE SERPL-SCNC: 105 MMOL/L
CHOLEST SERPL-MCNC: 167 MG/DL
CO2 SERPL-SCNC: 21 MMOL/L
CREAT SERPL-MCNC: 0.76 MG/DL
EGFR: 94 ML/MIN/1.73M2
EOSINOPHIL # BLD AUTO: 0.02 K/UL
EOSINOPHIL NFR BLD AUTO: 0.4 %
ESTIMATED AVERAGE GLUCOSE: 120 MG/DL
GLUCOSE SERPL-MCNC: 79 MG/DL
HBA1C MFR BLD HPLC: 5.8 %
HCT VFR BLD CALC: 40.8 %
HDLC SERPL-MCNC: 57 MG/DL
HGB BLD-MCNC: 12.2 G/DL
IMM GRANULOCYTES NFR BLD AUTO: 0 %
LDLC SERPL CALC-MCNC: 98 MG/DL
LYMPHOCYTES # BLD AUTO: 2.38 K/UL
LYMPHOCYTES NFR BLD AUTO: 47.9 %
MAN DIFF?: NORMAL
MCHC RBC-ENTMCNC: 27.7 PG
MCHC RBC-ENTMCNC: 29.9 GM/DL
MCV RBC AUTO: 92.7 FL
MONOCYTES # BLD AUTO: 0.41 K/UL
MONOCYTES NFR BLD AUTO: 8.2 %
NEUTROPHILS # BLD AUTO: 2.14 K/UL
NEUTROPHILS NFR BLD AUTO: 43.1 %
NONHDLC SERPL-MCNC: 110 MG/DL
PLATELET # BLD AUTO: 303 K/UL
POTASSIUM SERPL-SCNC: 4.3 MMOL/L
PROT SERPL-MCNC: 7.8 G/DL
RBC # BLD: 4.4 M/UL
RBC # FLD: 14.1 %
SODIUM SERPL-SCNC: 140 MMOL/L
TRIGL SERPL-MCNC: 61 MG/DL
WBC # FLD AUTO: 4.97 K/UL

## 2024-04-11 PROCEDURE — 93000 ELECTROCARDIOGRAM COMPLETE: CPT

## 2024-04-11 PROCEDURE — 99214 OFFICE O/P EST MOD 30 MIN: CPT

## 2024-04-12 LAB
APPEARANCE: CLEAR
APTT BLD: 32.6 SEC
BILIRUBIN URINE: NEGATIVE
BLOOD URINE: ABNORMAL
COLOR: YELLOW
GLUCOSE QUALITATIVE U: NEGATIVE MG/DL
HCG SERPL-MCNC: <1 MIU/ML
INR PPP: 1.04 RATIO
KETONES URINE: ABNORMAL MG/DL
LEUKOCYTE ESTERASE URINE: ABNORMAL
NITRITE URINE: POSITIVE
PH URINE: 6.5
PROTEIN URINE: NEGATIVE MG/DL
PT BLD: 11.7 SEC
SPECIFIC GRAVITY URINE: 1.02
UROBILINOGEN URINE: 0.2 MG/DL

## 2024-04-12 NOTE — HISTORY OF PRESENT ILLNESS
[No Pertinent Cardiac History] : no history of aortic stenosis, atrial fibrillation, coronary artery disease, recent myocardial infarction, or implantable device/pacemaker [No Pertinent Pulmonary History] : no history of asthma, COPD, sleep apnea, or smoking [No Adverse Anesthesia Reaction] : no adverse anesthesia reaction in self or family member [(Patient denies any chest pain, claudication, dyspnea on exertion, orthopnea, palpitations or syncope)] : Patient denies any chest pain, claudication, dyspnea on exertion, orthopnea, palpitations or syncope [Poor (<4 METs)] : Poor (<4 METs) [Chronic Anticoagulation] : no chronic anticoagulation [Chronic Kidney Disease] : no chronic kidney disease [FreeTextEntry1] : D&C  [FreeTextEntry2] : 04/16/2024 [FreeTextEntry3] : DR COOPER

## 2024-04-12 NOTE — ASSESSMENT
[As per surgery] : as per surgery [FreeTextEntry4] : waiting for lab results [Patient Optimized for Surgery] : Patient optimized for surgery

## 2024-04-17 ENCOUNTER — LABORATORY RESULT (OUTPATIENT)
Age: 54
End: 2024-04-17

## 2024-04-17 ENCOUNTER — APPOINTMENT (OUTPATIENT)
Dept: INTERNAL MEDICINE | Facility: CLINIC | Age: 54
End: 2024-04-17
Payer: COMMERCIAL

## 2024-04-17 VITALS
TEMPERATURE: 97.7 F | OXYGEN SATURATION: 99 % | BODY MASS INDEX: 32.76 KG/M2 | HEIGHT: 62 IN | DIASTOLIC BLOOD PRESSURE: 78 MMHG | WEIGHT: 178 LBS | HEART RATE: 76 BPM | SYSTOLIC BLOOD PRESSURE: 119 MMHG

## 2024-04-17 DIAGNOSIS — I10 ESSENTIAL (PRIMARY) HYPERTENSION: ICD-10-CM

## 2024-04-17 DIAGNOSIS — E55.9 VITAMIN D DEFICIENCY, UNSPECIFIED: ICD-10-CM

## 2024-04-17 DIAGNOSIS — Z00.00 ENCOUNTER FOR GENERAL ADULT MEDICAL EXAMINATION W/OUT ABNORMAL FINDINGS: ICD-10-CM

## 2024-04-17 PROCEDURE — G0447 BEHAVIOR COUNSEL OBESITY 15M: CPT | Mod: 59

## 2024-04-17 PROCEDURE — 99215 OFFICE O/P EST HI 40 MIN: CPT

## 2024-04-17 PROCEDURE — G2211 COMPLEX E/M VISIT ADD ON: CPT

## 2024-04-17 RX ORDER — ATORVASTATIN CALCIUM 40 MG/1
40 TABLET, FILM COATED ORAL
Qty: 1 | Refills: 1 | Status: ACTIVE | COMMUNITY
Start: 2024-02-16 | End: 1900-01-01

## 2024-04-17 RX ORDER — AMLODIPINE BESYLATE 5 MG/1
5 TABLET ORAL
Qty: 90 | Refills: 3 | Status: ACTIVE | COMMUNITY
Start: 2024-02-16 | End: 1900-01-01

## 2024-04-17 RX ORDER — PHENTERMINE HYDROCHLORIDE 30 MG/1
30 CAPSULE ORAL
Qty: 30 | Refills: 1 | Status: ACTIVE | COMMUNITY
Start: 2024-03-12 | End: 1900-01-01

## 2024-04-18 LAB
APPEARANCE: CLEAR
BILIRUBIN URINE: NEGATIVE
BLOOD URINE: ABNORMAL
COLOR: YELLOW
GLUCOSE QUALITATIVE U: NEGATIVE MG/DL
KETONES URINE: NEGATIVE MG/DL
LEUKOCYTE ESTERASE URINE: ABNORMAL
NITRITE URINE: NEGATIVE
PH URINE: 5.5
PROTEIN URINE: NEGATIVE MG/DL
SPECIFIC GRAVITY URINE: 1.03
UROBILINOGEN URINE: 0.2 MG/DL

## 2024-04-18 NOTE — PLAN
[FreeTextEntry1] : Patient admitted for seizures  - had extensive work-up including CTA, EEG and MRI brain without active episodes.  No further episodes since discharge. Had total of 5-7 episodes while hospitalized - described as generalized tonic-clonic seizures. Only   Per patient - she had initial episode at [facility] while working and then had multiple convulsive episodes in ED and during hospitalization. Patient reports that she admittedly was not checking her blood pressure regularly - and was intermittently compliant with her medications.   Of note, UA positive and UC grew E. coli - for which she was prescribed bactrim - has since finished course of abx.  #HFU #New onset seizures  Neurology eval pending  Started on lamictal as of 4/2024 - pending EEG and EMG (given new onset L sided weakness) Outside neurology notes reviewed - [facility]    #Chest pain  Saw cardiology at [facility] for new onset chest pain - currently wearing heart monitor  Stress test negative  #HTN Patient with history of hypertension. BP today acceptable Patient to continue current regimen as prescribed. Patient instructed to continue monitoring blood pressure at home and to keep a journal. Will continue to monitor patient's bp and adjust his hypertensive regimen as needed.  #Knee pain b/l PT ongoing   #Weight loss  Tolerating phentermine 15 with 3 lb weight loss  Will increase phentermine 30   I spent a total of 40 minutes on the date of this encounter that EXCLUDES time spent on AWV, depression screening, tobacco cessation and behavioral counseling.   This additional time included: Preparing to see the patient (e.g., review of test results) Obtaining and/or reviewing separately obtained history Performing a medically appropriate exam and/or evaluation Counseling and educating the patient/family/caregiver Ordering medications, tests, procedures Referring and communicating with other healthcare professionals, when not separately reported Documenting clinical information in the health record Care coordination not separately reported

## 2024-04-18 NOTE — COUNSELING
[Potential consequences of obesity discussed] : Potential consequences of obesity discussed [Benefits of weight loss discussed] : Benefits of weight loss discussed [Encouraged to increase physical activity] : Encouraged to increase physical activity [Encouraged to use exercise tracking device] : Encouraged to use exercise tracking device [Target Wt Loss Goal ___] : Weight Loss Goals: Target weight loss goal [unfilled] lbs [Weigh Self Weekly] : weigh self weekly [FreeTextEntry2] : No saturated fats Increase aerobic exercise [FreeTextEntry4] : 15

## 2024-04-18 NOTE — HISTORY OF PRESENT ILLNESS
[FreeTextEntry1] : Patient presents today for follow-up of chronic medical conditions.  [de-identified] : Interim -  Patient underwent recent D+C for vaginal bleeding, no complications EMG/NCV - upcoming per neurologist - Dr. Oriana Carrillo Started on AED for potential seizure - lamictal  Tolerating phentermine - lost 3 lbs   Prior -  Working with a nutrition  - doing well overall  Feels that she needs help with accountability to ensure long term weight loss Doing diet + exercise with  that has created plans for her  Prior -  Has been adjusting her diet to see if that will make improvement in her diet  Smaller portions, less sugar and carbs  Started PT  No recent symptoms of seizure, CVA - neurologically stable  Feels physically improved    Prior -  Starting 2/11/24 began having cognitive difficulty, generalized weakness, slurred speech - repeat stroke work-up negative. SBP also high.  Patient admitted for seizures - had extensive work-up including CTA, EEG and MRI brain without active episodes. No further episodes since discharge. Had total of 5-7 episodes while hospitalized - described as generalized tonic-clonic seizures.   Per patient - she had initial episode at [facility] while working and then had multiple convulsive episodes in ED and during hospitalization. Patient reports that she admittedly was not checking her blood pressure regularly - and was intermittently compliant with her medications.  Of note, UA positive and UC grew E. coli - for which she was prescribed bactrim - has since finished course of abx.

## 2024-05-30 ENCOUNTER — APPOINTMENT (OUTPATIENT)
Dept: INTERNAL MEDICINE | Facility: CLINIC | Age: 54
End: 2024-05-30
Payer: COMMERCIAL

## 2024-05-30 PROCEDURE — 36415 COLL VENOUS BLD VENIPUNCTURE: CPT

## 2024-06-03 ENCOUNTER — APPOINTMENT (OUTPATIENT)
Dept: INTERNAL MEDICINE | Facility: CLINIC | Age: 54
End: 2024-06-03
Payer: COMMERCIAL

## 2024-06-03 VITALS
SYSTOLIC BLOOD PRESSURE: 148 MMHG | HEIGHT: 62 IN | TEMPERATURE: 97.3 F | BODY MASS INDEX: 32.02 KG/M2 | HEART RATE: 74 BPM | OXYGEN SATURATION: 99 % | WEIGHT: 174 LBS | DIASTOLIC BLOOD PRESSURE: 78 MMHG

## 2024-06-03 DIAGNOSIS — E78.5 HYPERLIPIDEMIA, UNSPECIFIED: ICD-10-CM

## 2024-06-03 DIAGNOSIS — R92.30 DENSE BREASTS, UNSPECIFIED: ICD-10-CM

## 2024-06-03 DIAGNOSIS — I10 ESSENTIAL (PRIMARY) HYPERTENSION: ICD-10-CM

## 2024-06-03 DIAGNOSIS — E88.810 METABOLIC SYNDROME: ICD-10-CM

## 2024-06-03 LAB
ALBUMIN SERPL ELPH-MCNC: 4.2 G/DL
ALP BLD-CCNC: 97 U/L
ALT SERPL-CCNC: 17 U/L
ANION GAP SERPL CALC-SCNC: 13 MMOL/L
AST SERPL-CCNC: 15 U/L
BASOPHILS # BLD AUTO: 0.03 K/UL
BASOPHILS NFR BLD AUTO: 0.5 %
BILIRUB SERPL-MCNC: 0.3 MG/DL
BUN SERPL-MCNC: 15 MG/DL
CALCIUM SERPL-MCNC: 9.7 MG/DL
CHLORIDE SERPL-SCNC: 102 MMOL/L
CHOLEST SERPL-MCNC: 123 MG/DL
CO2 SERPL-SCNC: 22 MMOL/L
CREAT SERPL-MCNC: 0.83 MG/DL
EGFR: 85 ML/MIN/1.73M2
EOSINOPHIL # BLD AUTO: 0.06 K/UL
EOSINOPHIL NFR BLD AUTO: 0.9 %
ESTIMATED AVERAGE GLUCOSE: 123 MG/DL
GLUCOSE SERPL-MCNC: 92 MG/DL
HBA1C MFR BLD HPLC: 5.9 %
HCT VFR BLD CALC: 39.7 %
HDLC SERPL-MCNC: 59 MG/DL
HGB BLD-MCNC: 12.7 G/DL
IMM GRANULOCYTES NFR BLD AUTO: 0.2 %
LDLC SERPL CALC-MCNC: 55 MG/DL
LYMPHOCYTES # BLD AUTO: 2.74 K/UL
LYMPHOCYTES NFR BLD AUTO: 42.5 %
MAN DIFF?: NORMAL
MCHC RBC-ENTMCNC: 27.5 PG
MCHC RBC-ENTMCNC: 32 GM/DL
MCV RBC AUTO: 85.9 FL
MONOCYTES # BLD AUTO: 0.47 K/UL
MONOCYTES NFR BLD AUTO: 7.3 %
NEUTROPHILS # BLD AUTO: 3.13 K/UL
NEUTROPHILS NFR BLD AUTO: 48.6 %
NONHDLC SERPL-MCNC: 65 MG/DL
PLATELET # BLD AUTO: 359 K/UL
POTASSIUM SERPL-SCNC: 4.7 MMOL/L
PROT SERPL-MCNC: 8.2 G/DL
RBC # BLD: 4.62 M/UL
RBC # FLD: 13.4 %
SODIUM SERPL-SCNC: 138 MMOL/L
TRIGL SERPL-MCNC: 35 MG/DL
WBC # FLD AUTO: 6.44 K/UL

## 2024-06-03 PROCEDURE — G0447 BEHAVIOR COUNSEL OBESITY 15M: CPT | Mod: 59

## 2024-06-03 PROCEDURE — 99214 OFFICE O/P EST MOD 30 MIN: CPT

## 2024-06-03 PROCEDURE — G2211 COMPLEX E/M VISIT ADD ON: CPT

## 2024-06-03 RX ORDER — LAMOTRIGINE 25 MG/1
25 TABLET ORAL
Qty: 60 | Refills: 0 | Status: ACTIVE | COMMUNITY
Start: 2024-04-02

## 2024-06-03 NOTE — PLAN
[FreeTextEntry1] : Patient admitted for seizures  - had extensive work-up including CTA, EEG and MRI brain without active episodes.  No further episodes since discharge. Had total of 5-7 episodes while hospitalized - described as generalized tonic-clonic seizures. Only   Per patient - she had initial episode at [facility] while working and then had multiple convulsive episodes in ED and during hospitalization. Patient reports that she admittedly was not checking her blood pressure regularly - and was intermittently compliant with her medications.   Of note, UA positive and UC grew E. coli - for which she was prescribed bactrim - has since finished course of abx.  #HFU #New onset seizures  Neurology eval pending  Started on lamictal as of 4/2024 - pending EEG and EMG (given new onset L sided weakness) Outside neurology notes reviewed - [facility]    #Chest pain  Saw cardiology at [facility] for new onset chest pain - currently wearing heart monitor  Stress test negative  #HTN Patient with history of hypertension. BP today acceptable Patient to continue current regimen as prescribed. Patient instructed to continue monitoring blood pressure at home and to keep a journal. Will continue to monitor patient's bp and adjust his hypertensive regimen as needed.  #Knee pain b/l PT ongoing   #Weight loss  Will continue phentermine 30

## 2024-06-03 NOTE — HISTORY OF PRESENT ILLNESS
[FreeTextEntry1] : Patient presents today for follow-up of chronic medical conditions.  [de-identified] : Interim -  6/11 repeating D+C for ?inflammation  Fell on sidewalk yesterday evening - minor laceration on nose and upper lip Has otherwise felt well   Prior:  Patient underwent recent D+C for vaginal bleeding, no complications EMG/NCV - upcoming per neurologist - Dr. Oriana Carrillo Started on AED for potential seizure - lamictal  Tolerating phentermine - lost 3 lbs   Prior -  Working with a nutrition  - doing well overall  Feels that she needs help with accountability to ensure long term weight loss Doing diet + exercise with  that has created plans for her  Prior -  Has been adjusting her diet to see if that will make improvement in her diet  Smaller portions, less sugar and carbs  Started PT  No recent symptoms of seizure, CVA - neurologically stable  Feels physically improved    Prior -  Starting 2/11/24 began having cognitive difficulty, generalized weakness, slurred speech - repeat stroke work-up negative. SBP also high.  Patient admitted for seizures - had extensive work-up including CTA, EEG and MRI brain without active episodes. No further episodes since discharge. Had total of 5-7 episodes while hospitalized - described as generalized tonic-clonic seizures.   Per patient - she had initial episode at [facility] while working and then had multiple convulsive episodes in ED and during hospitalization. Patient reports that she admittedly was not checking her blood pressure regularly - and was intermittently compliant with her medications.  Of note, UA positive and UC grew E. coli - for which she was prescribed bactrim - has since finished course of abx.

## 2024-09-04 ENCOUNTER — APPOINTMENT (OUTPATIENT)
Dept: INTERNAL MEDICINE | Facility: CLINIC | Age: 54
End: 2024-09-04

## 2024-09-11 ENCOUNTER — APPOINTMENT (OUTPATIENT)
Dept: INTERNAL MEDICINE | Facility: CLINIC | Age: 54
End: 2024-09-11
Payer: SELF-PAY

## 2024-09-11 ENCOUNTER — NON-APPOINTMENT (OUTPATIENT)
Age: 54
End: 2024-09-11

## 2024-09-11 VITALS
WEIGHT: 171 LBS | BODY MASS INDEX: 31.47 KG/M2 | OXYGEN SATURATION: 100 % | DIASTOLIC BLOOD PRESSURE: 91 MMHG | SYSTOLIC BLOOD PRESSURE: 142 MMHG | HEART RATE: 84 BPM | HEIGHT: 62 IN

## 2024-09-11 DIAGNOSIS — M25.562 PAIN IN RIGHT KNEE: ICD-10-CM

## 2024-09-11 DIAGNOSIS — E78.5 HYPERLIPIDEMIA, UNSPECIFIED: ICD-10-CM

## 2024-09-11 DIAGNOSIS — M54.9 DORSALGIA, UNSPECIFIED: ICD-10-CM

## 2024-09-11 DIAGNOSIS — M25.561 PAIN IN RIGHT KNEE: ICD-10-CM

## 2024-09-11 DIAGNOSIS — I10 ESSENTIAL (PRIMARY) HYPERTENSION: ICD-10-CM

## 2024-09-11 DIAGNOSIS — R73.03 PREDIABETES.: ICD-10-CM

## 2024-09-11 DIAGNOSIS — E88.810 METABOLIC SYNDROME: ICD-10-CM

## 2024-09-11 PROCEDURE — 93000 ELECTROCARDIOGRAM COMPLETE: CPT | Mod: 59

## 2024-09-11 PROCEDURE — G2211 COMPLEX E/M VISIT ADD ON: CPT | Mod: NC

## 2024-09-11 PROCEDURE — 99396 PREV VISIT EST AGE 40-64: CPT

## 2024-09-11 PROCEDURE — 99214 OFFICE O/P EST MOD 30 MIN: CPT | Mod: 25

## 2024-09-11 PROCEDURE — G0447 BEHAVIOR COUNSEL OBESITY 15M: CPT | Mod: 59

## 2024-09-11 PROCEDURE — G0444 DEPRESSION SCREEN ANNUAL: CPT | Mod: 59

## 2024-09-15 NOTE — HEALTH RISK ASSESSMENT
[Never] : Never [No] : No [No falls in past year] : Patient reported no falls in the past year [0] : 2) Feeling down, depressed, or hopeless: Not at all (0) [PHQ-2 Negative - No further assessment needed] : PHQ-2 Negative - No further assessment needed [PHQ-9 Negative - No further assessment needed] : PHQ-9 Negative - No further assessment needed [Time Spent: ___ Minutes] : I spent [unfilled] minutes performing a depression screening for this patient. [Patient reported mammogram was normal] : Patient reported mammogram was normal [Patient reported colonoscopy was normal] : Patient reported colonoscopy was normal [[id number]] : 0 [MammogramDate] : 7/2023 [PapSmearDate] : 1/2024 [ColonoscopyDate] : 6/2023 [ColonoscopyComments] : 10 years

## 2024-09-15 NOTE — PLAN
[FreeTextEntry1] : #HFU #New onset seizures  Neurology eval pending  Started on lamictal as of 4/2024 - pending EEG and EMG (given new onset L sided weakness) Outside neurology notes reviewed - [facility]    #Chest pain  Saw cardiology at [facility] for new onset chest pain - currently wearing heart monitor  Stress test negative  #HTN Patient with history of hypertension. BP today acceptable Patient to continue current regimen as prescribed. Patient instructed to continue monitoring blood pressure at home and to keep a journal. Will continue to monitor patient's bp and adjust his hypertensive regimen as needed.   [ ] shingrix - will confirm exposure first  [ ] mammogram

## 2024-09-15 NOTE — HISTORY OF PRESENT ILLNESS
[FreeTextEntry1] : Patient presents for comprehensive medical evaluation today.  [de-identified] : Interim -  9/2024  started having chest pain  intermittent comes and goes throughout the day   6/11 repeating D+C for ?inflammation  Fell on sidewalk yesterday evening - minor laceration on nose and upper lip Has otherwise felt well   Prior:  Patient underwent recent D+C for vaginal bleeding, no complications EMG/NCV - upcoming per neurologist - Dr. Oriana Carrillo Started on AED for potential seizure - lamictal  Tolerating phentermine - lost 3 lbs   Prior -  Working with a nutrition  - doing well overall  Feels that she needs help with accountability to ensure long term weight loss Doing diet + exercise with  that has created plans for her  Prior -  Has been adjusting her diet to see if that will make improvement in her diet  Smaller portions, less sugar and carbs  Started PT  No recent symptoms of seizure, CVA - neurologically stable  Feels physically improved    Prior -  Starting 2/11/24 began having cognitive difficulty, generalized weakness, slurred speech - repeat stroke work-up negative. SBP also high.  Patient admitted for seizures - had extensive work-up including CTA, EEG and MRI brain without active episodes. No further episodes since discharge. Had total of 5-7 episodes while hospitalized - described as generalized tonic-clonic seizures.   Per patient - she had initial episode at [facility] while working and then had multiple convulsive episodes in ED and during hospitalization. Patient reports that she admittedly was not checking her blood pressure regularly - and was intermittently compliant with her medications.  Of note, UA positive and UC grew E. coli - for which she was prescribed bactrim - has since finished course of abx.

## 2024-09-15 NOTE — HISTORY OF PRESENT ILLNESS
[FreeTextEntry1] : Patient presents for comprehensive medical evaluation today.  [de-identified] : Interim -  9/2024  started having chest pain  intermittent comes and goes throughout the day   6/11 repeating D+C for ?inflammation  Fell on sidewalk yesterday evening - minor laceration on nose and upper lip Has otherwise felt well   Prior:  Patient underwent recent D+C for vaginal bleeding, no complications EMG/NCV - upcoming per neurologist - Dr. Oriana Carrillo Started on AED for potential seizure - lamictal  Tolerating phentermine - lost 3 lbs   Prior -  Working with a nutrition  - doing well overall  Feels that she needs help with accountability to ensure long term weight loss Doing diet + exercise with  that has created plans for her  Prior -  Has been adjusting her diet to see if that will make improvement in her diet  Smaller portions, less sugar and carbs  Started PT  No recent symptoms of seizure, CVA - neurologically stable  Feels physically improved    Prior -  Starting 2/11/24 began having cognitive difficulty, generalized weakness, slurred speech - repeat stroke work-up negative. SBP also high.  Patient admitted for seizures - had extensive work-up including CTA, EEG and MRI brain without active episodes. No further episodes since discharge. Had total of 5-7 episodes while hospitalized - described as generalized tonic-clonic seizures.   Per patient - she had initial episode at [facility] while working and then had multiple convulsive episodes in ED and during hospitalization. Patient reports that she admittedly was not checking her blood pressure regularly - and was intermittently compliant with her medications.  Of note, UA positive and UC grew E. coli - for which she was prescribed bactrim - has since finished course of abx.

## 2024-09-15 NOTE — PLAN
[FreeTextEntry1] : #HFU #New onset seizures  Neurology eval pending  Started on lamictal as of 4/2024 - pending EEG and EMG (given new onset L sided weakness) Outside neurology notes reviewed - [facility]    #Chest pain  Saw cardiology at [facility] for new onset chest pain - currently wearing heart monitor  Stress test negative  #HTN Patient with history of hypertension. BP today acceptable Patient to continue current regimen as prescribed. Patient instructed to continue monitoring blood pressure at home and to keep a journal. Will continue to monitor patient's bp and adjust his hypertensive regimen as needed.   [ ] shingrix - will confirm exposure first  [ ] mammogram

## 2024-09-15 NOTE — HEALTH RISK ASSESSMENT
[Never] : Never [No] : No [No falls in past year] : Patient reported no falls in the past year [0] : 2) Feeling down, depressed, or hopeless: Not at all (0) [PHQ-2 Negative - No further assessment needed] : PHQ-2 Negative - No further assessment needed [PHQ-9 Negative - No further assessment needed] : PHQ-9 Negative - No further assessment needed [Time Spent: ___ Minutes] : I spent [unfilled] minutes performing a depression screening for this patient. [Patient reported mammogram was normal] : Patient reported mammogram was normal [Patient reported colonoscopy was normal] : Patient reported colonoscopy was normal [[id number]] : 0 [MammogramDate] : 7/2023 [PapSmearDate] : 1/2024 [ColonoscopyDate] : 6/2023 [ColonoscopyComments] : 10 years

## 2025-03-05 ENCOUNTER — APPOINTMENT (OUTPATIENT)
Dept: INTERNAL MEDICINE | Facility: CLINIC | Age: 55
End: 2025-03-05

## 2025-03-12 ENCOUNTER — APPOINTMENT (OUTPATIENT)
Dept: INTERNAL MEDICINE | Facility: CLINIC | Age: 55
End: 2025-03-12
Payer: COMMERCIAL

## 2025-03-12 VITALS
DIASTOLIC BLOOD PRESSURE: 85 MMHG | HEIGHT: 62 IN | WEIGHT: 169 LBS | OXYGEN SATURATION: 99 % | BODY MASS INDEX: 31.1 KG/M2 | SYSTOLIC BLOOD PRESSURE: 129 MMHG | HEART RATE: 63 BPM

## 2025-03-12 DIAGNOSIS — I10 ESSENTIAL (PRIMARY) HYPERTENSION: ICD-10-CM

## 2025-03-12 DIAGNOSIS — E78.5 HYPERLIPIDEMIA, UNSPECIFIED: ICD-10-CM

## 2025-03-12 PROCEDURE — 99215 OFFICE O/P EST HI 40 MIN: CPT

## 2025-03-12 PROCEDURE — G2211 COMPLEX E/M VISIT ADD ON: CPT

## 2025-03-12 RX ORDER — SEMAGLUTIDE 0.25 MG/.5ML
0.25 INJECTION, SOLUTION SUBCUTANEOUS
Qty: 1 | Refills: 5 | Status: ACTIVE | COMMUNITY
Start: 2025-03-12 | End: 1900-01-01

## 2025-06-02 ENCOUNTER — NON-APPOINTMENT (OUTPATIENT)
Age: 55
End: 2025-06-02

## 2025-06-02 ENCOUNTER — APPOINTMENT (OUTPATIENT)
Dept: INTERNAL MEDICINE | Facility: CLINIC | Age: 55
End: 2025-06-02

## 2025-06-02 VITALS
HEART RATE: 68 BPM | HEIGHT: 62 IN | BODY MASS INDEX: 31.83 KG/M2 | SYSTOLIC BLOOD PRESSURE: 132 MMHG | OXYGEN SATURATION: 97 % | DIASTOLIC BLOOD PRESSURE: 80 MMHG | WEIGHT: 173 LBS

## 2025-06-02 DIAGNOSIS — I10 ESSENTIAL (PRIMARY) HYPERTENSION: ICD-10-CM

## 2025-06-02 DIAGNOSIS — E78.5 HYPERLIPIDEMIA, UNSPECIFIED: ICD-10-CM

## 2025-06-02 DIAGNOSIS — M54.9 DORSALGIA, UNSPECIFIED: ICD-10-CM

## 2025-06-02 DIAGNOSIS — E88.810 METABOLIC SYNDROME: ICD-10-CM

## 2025-06-02 PROCEDURE — 99215 OFFICE O/P EST HI 40 MIN: CPT

## 2025-06-02 PROCEDURE — 93000 ELECTROCARDIOGRAM COMPLETE: CPT

## 2025-06-02 PROCEDURE — G2211 COMPLEX E/M VISIT ADD ON: CPT

## 2025-07-22 NOTE — ED PROVIDER NOTE - IV ALTEPLASE EXCL ABS HIDDEN
CS rescheduled the appointment  
Hub staff attempted to follow warm transfer process and was unsuccessful     Caller: Donna Varma    Relationship to patient: Self    Best call back number:   Telephone Information:   Mobile [phone]        Patient is needing: PATIENT IS SCHEDULED FOR A ROUTINE OB FOLLOW UP ON 07/22/2025 WITH DR. BALLARD AND PATIENT IS INQUIRING IF HER APPOINTMENT CAN BE MOVED TO THE END OF THE WEEK OR ANY DAY/TIME WITH ANY PROVIDER.  PATIENT STATES SHE IS NEEDING THIS APPOINTMENT MOVED SO THE FATHER OF THE BABY IS NOT AWARE OF APPOINTMENT DATE/TIME DUE TO CONFLICT WITHIN THE RELATIONSHIP AND SHE IS NOT WANTING DRAMA DURING HER PREGNANCY OR DURING HER VISITS.  PATIENT DOES NOT WANT THE FATHER OF THE BABY TO KNOW ANYTHING CURRENTLY AND WOULD LIKE TO UPDATE HER VERBAL AUTHORIZATION WHEN SHE COMES IN TO HER APPOINTMENT.         
show

## 2025-07-29 ENCOUNTER — APPOINTMENT (OUTPATIENT)
Dept: INTERNAL MEDICINE | Facility: CLINIC | Age: 55
End: 2025-07-29

## 2025-09-20 ENCOUNTER — APPOINTMENT (OUTPATIENT)
Dept: INTERNAL MEDICINE | Facility: CLINIC | Age: 55
End: 2025-09-20

## 2025-09-20 VITALS
SYSTOLIC BLOOD PRESSURE: 177 MMHG | HEART RATE: 61 BPM | WEIGHT: 168 LBS | DIASTOLIC BLOOD PRESSURE: 96 MMHG | OXYGEN SATURATION: 99 % | BODY MASS INDEX: 30.91 KG/M2 | HEIGHT: 62 IN

## 2025-09-20 DIAGNOSIS — I10 ESSENTIAL (PRIMARY) HYPERTENSION: ICD-10-CM

## 2025-09-20 DIAGNOSIS — E78.5 HYPERLIPIDEMIA, UNSPECIFIED: ICD-10-CM

## 2025-09-20 DIAGNOSIS — E88.810 METABOLIC SYNDROME: ICD-10-CM

## 2025-09-20 LAB
25(OH)D3 SERPL-MCNC: 30.8 NG/ML
ALBUMIN SERPL ELPH-MCNC: 4.2 G/DL
ALP BLD-CCNC: 78 U/L
ALT SERPL-CCNC: 22 U/L
ANION GAP SERPL CALC-SCNC: 12 MMOL/L
AST SERPL-CCNC: 17 U/L
BASOPHILS # BLD AUTO: 0.04 K/UL
BASOPHILS NFR BLD AUTO: 0.7 %
BILIRUB SERPL-MCNC: 0.3 MG/DL
BUN SERPL-MCNC: 14 MG/DL
CALCIUM SERPL-MCNC: 9.8 MG/DL
CHLORIDE SERPL-SCNC: 106 MMOL/L
CHOLEST SERPL-MCNC: 180 MG/DL
CO2 SERPL-SCNC: 23 MMOL/L
CREAT SERPL-MCNC: 0.8 MG/DL
EGFRCR SERPLBLD CKD-EPI 2021: 88 ML/MIN/1.73M2
EOSINOPHIL # BLD AUTO: 0.02 K/UL
EOSINOPHIL NFR BLD AUTO: 0.4 %
ESTIMATED AVERAGE GLUCOSE: 114 MG/DL
GLUCOSE SERPL-MCNC: 80 MG/DL
HBA1C MFR BLD HPLC: 5.6 %
HCT VFR BLD CALC: 38.9 %
HDLC SERPL-MCNC: 70 MG/DL
HGB BLD-MCNC: 12.5 G/DL
IMM GRANULOCYTES NFR BLD AUTO: 0 %
LDLC SERPL-MCNC: 97 MG/DL
LYMPHOCYTES # BLD AUTO: 2.65 K/UL
LYMPHOCYTES NFR BLD AUTO: 46.8 %
MAN DIFF?: NORMAL
MCHC RBC-ENTMCNC: 27.7 PG
MCHC RBC-ENTMCNC: 32.1 G/DL
MCV RBC AUTO: 86.1 FL
MONOCYTES # BLD AUTO: 0.45 K/UL
MONOCYTES NFR BLD AUTO: 8 %
NEUTROPHILS # BLD AUTO: 2.5 K/UL
NEUTROPHILS NFR BLD AUTO: 44.1 %
NONHDLC SERPL-MCNC: 110 MG/DL
PLATELET # BLD AUTO: 268 K/UL
POTASSIUM SERPL-SCNC: 4.2 MMOL/L
PROT SERPL-MCNC: 7.3 G/DL
RBC # BLD: 4.52 M/UL
RBC # FLD: 15 %
SODIUM SERPL-SCNC: 141 MMOL/L
TRIGL SERPL-MCNC: 71 MG/DL
TSH SERPL-ACNC: 2.64 UIU/ML
WBC # FLD AUTO: 5.66 K/UL

## 2025-09-20 RX ORDER — DICLOFENAC SODIUM 10 MG/G
1 GEL TOPICAL
Qty: 1 | Refills: 3 | Status: ACTIVE | COMMUNITY
Start: 2025-09-20 | End: 1900-01-01